# Patient Record
Sex: MALE | URBAN - METROPOLITAN AREA
[De-identification: names, ages, dates, MRNs, and addresses within clinical notes are randomized per-mention and may not be internally consistent; named-entity substitution may affect disease eponyms.]

---

## 2021-04-06 ENCOUNTER — OUTPATIENT (OUTPATIENT)
Dept: OUTPATIENT SERVICES | Facility: HOSPITAL | Age: 66
LOS: 1 days | End: 2021-04-06
Payer: MEDICAID

## 2021-04-06 VITALS
HEIGHT: 68 IN | HEART RATE: 65 BPM | OXYGEN SATURATION: 97 % | RESPIRATION RATE: 17 BRPM | SYSTOLIC BLOOD PRESSURE: 169 MMHG | TEMPERATURE: 98 F | DIASTOLIC BLOOD PRESSURE: 89 MMHG | WEIGHT: 169.98 LBS

## 2021-04-06 DIAGNOSIS — I10 ESSENTIAL (PRIMARY) HYPERTENSION: ICD-10-CM

## 2021-04-06 DIAGNOSIS — M17.11 UNILATERAL PRIMARY OSTEOARTHRITIS, RIGHT KNEE: ICD-10-CM

## 2021-04-06 DIAGNOSIS — Z01.818 ENCOUNTER FOR OTHER PREPROCEDURAL EXAMINATION: ICD-10-CM

## 2021-04-06 DIAGNOSIS — I25.10 ATHEROSCLEROTIC HEART DISEASE OF NATIVE CORONARY ARTERY WITHOUT ANGINA PECTORIS: ICD-10-CM

## 2021-04-06 DIAGNOSIS — Z95.5 PRESENCE OF CORONARY ANGIOPLASTY IMPLANT AND GRAFT: ICD-10-CM

## 2021-04-06 DIAGNOSIS — Z95.5 PRESENCE OF CORONARY ANGIOPLASTY IMPLANT AND GRAFT: Chronic | ICD-10-CM

## 2021-04-06 DIAGNOSIS — Z29.9 ENCOUNTER FOR PROPHYLACTIC MEASURES, UNSPECIFIED: ICD-10-CM

## 2021-04-06 LAB
ANION GAP SERPL CALC-SCNC: 6 MMOL/L — SIGNIFICANT CHANGE UP (ref 5–17)
APTT BLD: 35.8 SEC — HIGH (ref 27.5–35.5)
BUN SERPL-MCNC: 11 MG/DL — SIGNIFICANT CHANGE UP (ref 7–18)
CALCIUM SERPL-MCNC: 10.2 MG/DL — SIGNIFICANT CHANGE UP (ref 8.4–10.5)
CHLORIDE SERPL-SCNC: 111 MMOL/L — HIGH (ref 96–108)
CO2 SERPL-SCNC: 27 MMOL/L — SIGNIFICANT CHANGE UP (ref 22–31)
CREAT SERPL-MCNC: 0.88 MG/DL — SIGNIFICANT CHANGE UP (ref 0.5–1.3)
GLUCOSE SERPL-MCNC: 86 MG/DL — SIGNIFICANT CHANGE UP (ref 70–99)
HCT VFR BLD CALC: 41.9 % — SIGNIFICANT CHANGE UP (ref 39–50)
HGB BLD-MCNC: 13.6 G/DL — SIGNIFICANT CHANGE UP (ref 13–17)
INR BLD: 1.05 RATIO — SIGNIFICANT CHANGE UP (ref 0.88–1.16)
MCHC RBC-ENTMCNC: 30.4 PG — SIGNIFICANT CHANGE UP (ref 27–34)
MCHC RBC-ENTMCNC: 32.5 GM/DL — SIGNIFICANT CHANGE UP (ref 32–36)
MCV RBC AUTO: 93.7 FL — SIGNIFICANT CHANGE UP (ref 80–100)
NRBC # BLD: 0 /100 WBCS — SIGNIFICANT CHANGE UP (ref 0–0)
PLATELET # BLD AUTO: 174 K/UL — SIGNIFICANT CHANGE UP (ref 150–400)
POTASSIUM SERPL-MCNC: 4 MMOL/L — SIGNIFICANT CHANGE UP (ref 3.5–5.3)
POTASSIUM SERPL-SCNC: 4 MMOL/L — SIGNIFICANT CHANGE UP (ref 3.5–5.3)
PROTHROM AB SERPL-ACNC: 12.5 SEC — SIGNIFICANT CHANGE UP (ref 10.6–13.6)
RBC # BLD: 4.47 M/UL — SIGNIFICANT CHANGE UP (ref 4.2–5.8)
RBC # FLD: 12.4 % — SIGNIFICANT CHANGE UP (ref 10.3–14.5)
SODIUM SERPL-SCNC: 144 MMOL/L — SIGNIFICANT CHANGE UP (ref 135–145)
WBC # BLD: 5.35 K/UL — SIGNIFICANT CHANGE UP (ref 3.8–10.5)
WBC # FLD AUTO: 5.35 K/UL — SIGNIFICANT CHANGE UP (ref 3.8–10.5)

## 2021-04-06 PROCEDURE — 36415 COLL VENOUS BLD VENIPUNCTURE: CPT

## 2021-04-06 PROCEDURE — 85730 THROMBOPLASTIN TIME PARTIAL: CPT

## 2021-04-06 PROCEDURE — 83036 HEMOGLOBIN GLYCOSYLATED A1C: CPT

## 2021-04-06 PROCEDURE — 85027 COMPLETE CBC AUTOMATED: CPT

## 2021-04-06 PROCEDURE — 80048 BASIC METABOLIC PNL TOTAL CA: CPT

## 2021-04-06 PROCEDURE — 87641 MR-STAPH DNA AMP PROBE: CPT

## 2021-04-06 PROCEDURE — 87640 STAPH A DNA AMP PROBE: CPT

## 2021-04-06 PROCEDURE — 85610 PROTHROMBIN TIME: CPT

## 2021-04-06 RX ORDER — ISOSORBIDE DINITRATE 5 MG/1
1 TABLET ORAL
Qty: 0 | Refills: 0 | DISCHARGE

## 2021-04-06 RX ORDER — AMLODIPINE BESYLATE 2.5 MG/1
1 TABLET ORAL
Qty: 0 | Refills: 0 | DISCHARGE

## 2021-04-06 RX ORDER — ATORVASTATIN CALCIUM 80 MG/1
1 TABLET, FILM COATED ORAL
Qty: 0 | Refills: 0 | DISCHARGE

## 2021-04-06 RX ORDER — ASPIRIN/CALCIUM CARB/MAGNESIUM 324 MG
1 TABLET ORAL
Qty: 0 | Refills: 0 | DISCHARGE

## 2021-04-06 RX ORDER — TICAGRELOR 90 MG/1
1 TABLET ORAL
Qty: 0 | Refills: 0 | DISCHARGE

## 2021-04-06 RX ORDER — LOSARTAN POTASSIUM 100 MG/1
1 TABLET, FILM COATED ORAL
Qty: 0 | Refills: 0 | DISCHARGE

## 2021-04-06 NOTE — H&P PST ADULT - NSICDXPROBLEM_GEN_ALL_CORE_FT
PROBLEM DIAGNOSES  Problem: Unilateral primary osteoarthritis, right knee  Assessment and Plan: Right Total Knee Replacement    Problem: Need for prophylactic measure  Assessment and Plan: Patient is instructed via  to take two showers, x 3 days (4/14, 4/15, 4/16), before coming for surgery.  First with regular soap, second with chlorhexidine soap given, rinse, wear clean clothes each day, come to the hospital on the 3rd days.   Patient verbalized understanding.   Literature also given  He is also told that he will be called if the swab result is positive.  At such time, he will  mupirocin ointment from his pharmacy and will apply same to both nostrils, twice daily    Problem: CAD (coronary artery disease)  Assessment and Plan: Continue your medication    Problem: Hypertension  Assessment and Plan: Continue your medications    Problem: Stented coronary artery  Assessment and Plan: Continue aspirin as before.  Follow your doctor's instruction regarding Brilinta

## 2021-04-06 NOTE — H&P PST ADULT - NSICDXPASTMEDICALHX_GEN_ALL_CORE_FT
PAST MEDICAL HISTORY:  CAD (coronary artery disease)     Hypertension     Stented coronary artery x 1 stent

## 2021-04-06 NOTE — H&P PST ADULT - HISTORY OF PRESENT ILLNESS
64 yo male with history of HLD, HLD, CAD with one stent placed x 1 years ago, reports the above.  He is scheduled for : Right Total Knee Replacement, on 4/16/21.  Patient wants to have the surgery to decrease the pain and not to have fluid in my knee.  The lastr time they removed fluid was maybe 10 days ago

## 2021-04-06 NOTE — H&P PST ADULT - REASON FOR ADMISSION
I am going to have right knee surgery.  I have a lot of pain in that knee and the doctor always has to remove fluid from my knee

## 2021-04-07 LAB
A1C WITH ESTIMATED AVERAGE GLUCOSE RESULT: 6.5 % — HIGH (ref 4–5.6)
ESTIMATED AVERAGE GLUCOSE: 140 MG/DL — HIGH (ref 68–114)
MRSA PCR RESULT.: SIGNIFICANT CHANGE UP
S AUREUS DNA NOSE QL NAA+PROBE: SIGNIFICANT CHANGE UP

## 2024-03-20 PROBLEM — I25.10 ATHEROSCLEROTIC HEART DISEASE OF NATIVE CORONARY ARTERY WITHOUT ANGINA PECTORIS: Chronic | Status: ACTIVE | Noted: 2021-04-06

## 2024-03-20 PROBLEM — Z95.5 PRESENCE OF CORONARY ANGIOPLASTY IMPLANT AND GRAFT: Chronic | Status: ACTIVE | Noted: 2021-04-06

## 2024-03-20 PROBLEM — I10 ESSENTIAL (PRIMARY) HYPERTENSION: Chronic | Status: ACTIVE | Noted: 2021-04-06

## 2024-03-20 PROBLEM — Z00.00 ENCOUNTER FOR PREVENTIVE HEALTH EXAMINATION: Status: ACTIVE | Noted: 2024-03-20

## 2024-03-21 ENCOUNTER — APPOINTMENT (OUTPATIENT)
Dept: SURGICAL ONCOLOGY | Facility: CLINIC | Age: 69
End: 2024-03-21
Payer: MEDICARE

## 2024-03-21 ENCOUNTER — NON-APPOINTMENT (OUTPATIENT)
Age: 69
End: 2024-03-21

## 2024-03-21 VITALS
HEART RATE: 58 BPM | BODY MASS INDEX: 20.27 KG/M2 | HEIGHT: 69.7 IN | DIASTOLIC BLOOD PRESSURE: 68 MMHG | OXYGEN SATURATION: 97 % | SYSTOLIC BLOOD PRESSURE: 125 MMHG | WEIGHT: 140 LBS

## 2024-03-21 DIAGNOSIS — Z86.39 PERSONAL HISTORY OF OTHER ENDOCRINE, NUTRITIONAL AND METABOLIC DISEASE: ICD-10-CM

## 2024-03-21 DIAGNOSIS — Z86.79 PERSONAL HISTORY OF OTHER DISEASES OF THE CIRCULATORY SYSTEM: ICD-10-CM

## 2024-03-21 DIAGNOSIS — C25.1 MALIGNANT NEOPLASM OF BODY OF PANCREAS: ICD-10-CM

## 2024-03-21 PROCEDURE — 99205 OFFICE O/P NEW HI 60 MIN: CPT

## 2024-03-24 PROBLEM — Z86.79 HISTORY OF ATRIAL FIBRILLATION: Status: RESOLVED | Noted: 2024-03-24 | Resolved: 2024-03-24

## 2024-03-24 PROBLEM — C25.1 PRIMARY CANCER OF BODY OF PANCREAS: Status: ACTIVE | Noted: 2024-03-24

## 2024-03-24 PROBLEM — Z86.39 HISTORY OF DIABETES MELLITUS: Status: RESOLVED | Noted: 2024-03-24 | Resolved: 2024-03-24

## 2024-03-24 RX ORDER — APIXABAN 5 MG/1
TABLET, FILM COATED ORAL
Refills: 0 | Status: ACTIVE | COMMUNITY

## 2024-03-24 NOTE — CONSULT LETTER
[Dear  ___] : Dear  [unfilled], [Courtesy Letter:] : I had the pleasure of seeing your patient, [unfilled], in my office today. [Please see my note below.] : Please see my note below. [Consult Closing:] : Thank you very much for allowing me to participate in the care of this patient.  If you have any questions, please do not hesitate to contact me. [Sincerely,] : Sincerely, [FreeTextEntry2] : Dr. Jaylan Snyder [FreeTextEntry3] : Toro Alonso (M) [phone] (O) [phone] [DrKeely  ___] : Dr. BUCHANAN

## 2024-03-24 NOTE — HISTORY OF PRESENT ILLNESS
[de-identified] : Mr. 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 CT imaging 10/17/2022 demonstrated a 1.8 x 1.4 cm pancreatic body mass. EUS/FNA 11/04/2022 demonstrated invasive adenocarcinoma. Patient was evaluated by surgical/medical oncology at [facility] and felt to have a borderline resectable cancer and was recommended to receive neoadjuvant chemotherapy. He received 3 cycles of mFOLFIRINOX neoadjuvant chemotherapy. Interval imaging 01/23/2023 showed interval enlargement of the pancreas cancer to 2. x 1.8 cm, now with posteriorly involving splenic artery/vein and celiac axis.  There was no evidence of distant metastasis. He is s/p celiac nerve block 02/06/2023. He then started Xeloda/radiation treatment 02/24/2023 which he completed 03/29/2023. CT pancreas protocol 06/02/2023 showed stable 2.0 cm pancreas body cancer with celiac involvement. Due to unresectability, he started FOLXFOX 06/05/2023 for 2 cycles. CT evaluation 11/20/2023 showed stable 1.5 cm pancreas cancer but with encasement of celiac axis.  No metastatic disease. 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  No obvious metastatic disease.  He has been recommended to continue with chemotherapy, possibly Aegenus C-800-22 study with Gemcitabine/Abraxane +/- Botensilimab. He presents for discussion of potential surgical resection. Patient is tolerating diet but has persistent upper abdominal pain.  He denies nausea/emesis.

## 2024-03-24 NOTE — REASON FOR VISIT
[Initial Consultation] : an initial consultation for [Family Member] : family member [FreeTextEntry2] : locally advanced/unresectable pancreatic body cancer

## 2024-03-24 NOTE — ASSESSMENT
[FreeTextEntry1] : 67 y/o male presents with a locally advanced pancreatic cancer involving celiac axis. Due to significant posterior infiltration toward aorta, he is no longer a candidate for surgical resection. All previous imaging was reviewed - he was resectable at initial presentation with distal pancreatectomy/splenectomy and potentially resectable with DP/CAR early 2023 when there was tumor progression started to infiltrate toward celiac axis. Elevated CA 19-9 but laboratory values are not available for review. He has not exhibited imaging evidence of distant metastasis. Agree with need to continue systemic therapy. Will review iimaging at [facility] to see if patient is a candidate for irreversible electroporation (ARAMIS). All questions answered.

## 2024-03-25 ENCOUNTER — OUTPATIENT (OUTPATIENT)
Dept: OUTPATIENT SERVICES | Facility: HOSPITAL | Age: 69
LOS: 1 days | End: 2024-03-25
Payer: MEDICARE

## 2024-03-25 ENCOUNTER — RESULT REVIEW (OUTPATIENT)
Age: 69
End: 2024-03-25

## 2024-03-25 DIAGNOSIS — C25.9 MALIGNANT NEOPLASM OF PANCREAS, UNSPECIFIED: ICD-10-CM

## 2024-03-25 DIAGNOSIS — Z95.5 PRESENCE OF CORONARY ANGIOPLASTY IMPLANT AND GRAFT: Chronic | ICD-10-CM

## 2024-03-25 PROCEDURE — 88321 CONSLTJ&REPRT SLD PREP ELSWR: CPT

## 2024-03-26 LAB — SURGICAL PATHOLOGY STUDY: SIGNIFICANT CHANGE UP

## 2024-06-06 ENCOUNTER — APPOINTMENT (OUTPATIENT)
Dept: RADIATION ONCOLOGY | Facility: CLINIC | Age: 69
End: 2024-06-06
Payer: MEDICARE

## 2024-06-06 ENCOUNTER — OUTPATIENT (OUTPATIENT)
Dept: OUTPATIENT SERVICES | Facility: HOSPITAL | Age: 69
LOS: 1 days | Discharge: HOME | End: 2024-06-06
Payer: MEDICARE

## 2024-06-06 VITALS
RESPIRATION RATE: 16 BRPM | OXYGEN SATURATION: 99 % | BODY MASS INDEX: 18.25 KG/M2 | DIASTOLIC BLOOD PRESSURE: 61 MMHG | HEART RATE: 54 BPM | HEIGHT: 69 IN | WEIGHT: 123.24 LBS | TEMPERATURE: 96.98 F | SYSTOLIC BLOOD PRESSURE: 94 MMHG

## 2024-06-06 DIAGNOSIS — Z92.3 PERSONAL HISTORY OF IRRADIATION: ICD-10-CM

## 2024-06-06 DIAGNOSIS — Z95.5 PRESENCE OF CORONARY ANGIOPLASTY IMPLANT AND GRAFT: Chronic | ICD-10-CM

## 2024-06-06 DIAGNOSIS — Z92.21 PERSONAL HISTORY OF ANTINEOPLASTIC CHEMOTHERAPY: ICD-10-CM

## 2024-06-06 DIAGNOSIS — I25.10 ATHEROSCLEROTIC HEART DISEASE OF NATIVE CORONARY ARTERY W/OUT ANGINA PECTORIS: ICD-10-CM

## 2024-06-06 DIAGNOSIS — E78.5 HYPERLIPIDEMIA, UNSPECIFIED: ICD-10-CM

## 2024-06-06 DIAGNOSIS — I10 ESSENTIAL (PRIMARY) HYPERTENSION: ICD-10-CM

## 2024-06-06 DIAGNOSIS — Z80.8 FAMILY HISTORY OF MALIGNANT NEOPLASM OF OTHER ORGANS OR SYSTEMS: ICD-10-CM

## 2024-06-06 DIAGNOSIS — Z80.1 FAMILY HISTORY OF MALIGNANT NEOPLASM OF TRACHEA, BRONCHUS AND LUNG: ICD-10-CM

## 2024-06-06 PROCEDURE — 99204 OFFICE O/P NEW MOD 45 MIN: CPT | Mod: GC

## 2024-06-06 RX ORDER — LINACLOTIDE 290 UG/1
290 CAPSULE, GELATIN COATED ORAL
Refills: 0 | Status: ACTIVE | COMMUNITY

## 2024-06-06 RX ORDER — ASPIRIN 81 MG
81 TABLET, DELAYED RELEASE (ENTERIC COATED) ORAL
Refills: 0 | Status: ACTIVE | COMMUNITY

## 2024-06-06 RX ORDER — DIGOXIN 125 UG/1
125 TABLET ORAL
Refills: 0 | Status: ACTIVE | COMMUNITY

## 2024-06-06 RX ORDER — METFORMIN HYDROCHLORIDE 850 MG/1
850 TABLET, COATED ORAL
Refills: 0 | Status: ACTIVE | COMMUNITY

## 2024-06-06 RX ORDER — ISOSORBIDE MONONITRATE 30 MG
30 TABLET, EXTENDED RELEASE 24 HR ORAL
Refills: 0 | Status: ACTIVE | COMMUNITY

## 2024-06-06 RX ORDER — AMLODIPINE BESYLATE 5 MG/1
5 TABLET ORAL
Refills: 0 | Status: ACTIVE | COMMUNITY

## 2024-06-06 RX ORDER — ATORVASTATIN CALCIUM 20 MG/1
20 TABLET, FILM COATED ORAL
Refills: 0 | Status: ACTIVE | COMMUNITY

## 2024-06-06 RX ORDER — FENTANYL 75 UG/H
75 PATCH, EXTENDED RELEASE TRANSDERMAL
Refills: 0 | Status: ACTIVE | COMMUNITY

## 2024-06-06 RX ORDER — SENNOSIDES 8.6 MG TABLETS 8.6 MG/1
8.6 TABLET ORAL
Refills: 0 | Status: ACTIVE | COMMUNITY

## 2024-06-06 RX ORDER — POLYETHYLENE GLYCOL 3350 17 G/17G
17 POWDER, FOR SOLUTION ORAL
Refills: 0 | Status: ACTIVE | COMMUNITY

## 2024-06-06 RX ORDER — HYDROMORPHONE HYDROCHLORIDE 4 MG/1
4 TABLET ORAL
Refills: 0 | Status: ACTIVE | COMMUNITY

## 2024-06-06 RX ORDER — LACTULOSE 10 G/15ML
10 SOLUTION ORAL
Refills: 0 | Status: ACTIVE | COMMUNITY

## 2024-06-06 RX ORDER — LOSARTAN POTASSIUM 100 MG/1
100 TABLET, FILM COATED ORAL
Refills: 0 | Status: ACTIVE | COMMUNITY

## 2024-06-06 NOTE — VITALS
[Maximal Pain Intensity: 8/10] : 8/10 [Least Pain Intensity: 4/10] : 4/10 [Pain Duration: ___] : Pain duration: [unfilled] [Pain Location: ___] : Pain Location: [unfilled] [Pain Interferes with ADLs] : Pain interferes with activities of daily living. [Opioid] : opioid [80: Normal activity with effort; some signs or symptoms of disease.] : 80: Normal activity with effort; some signs or symptoms of disease.  [ECOG Performance Status: 1 - Restricted in physically strenuous activity but ambulatory and able to carry out work of a light or sedentary nature] : Performance Status: 1 - Restricted in physically strenuous activity but ambulatory and able to carry out work of a light or sedentary nature, e.g., light house work, office work [5 - Distress Level] : Distress Level: 5

## 2024-06-11 ENCOUNTER — NON-APPOINTMENT (OUTPATIENT)
Age: 69
End: 2024-06-11

## 2024-06-11 VITALS
HEART RATE: 55 BPM | OXYGEN SATURATION: 100 % | TEMPERATURE: 97 F | RESPIRATION RATE: 17 BRPM | HEIGHT: 69 IN | BODY MASS INDEX: 19.48 KG/M2 | DIASTOLIC BLOOD PRESSURE: 68 MMHG | WEIGHT: 131.5 LBS | SYSTOLIC BLOOD PRESSURE: 116 MMHG

## 2024-06-11 PROCEDURE — 77263 THER RADIOLOGY TX PLNG CPLX: CPT

## 2024-06-11 PROCEDURE — 77334 RADIATION TREATMENT AID(S): CPT | Mod: 26

## 2024-06-11 PROCEDURE — 77290 THER RAD SIMULAJ FIELD CPLX: CPT | Mod: 26

## 2024-06-12 NOTE — REVIEW OF SYSTEMS
[Recent Change In Weight] : ~T recent weight change [Abdominal Pain] : abdominal pain [Constipation] : constipation [Joint Pain] : joint pain [Muscle Pain] : muscle pain [Muscle Weakness] : muscle weakness [Anxiety] : anxiety [Easy Bleeding] : a tendency for easy bleeding [Easy Bruising] : a tendency for easy bruising [Negative] : Allergic/Immunologic [Fever] : no fever [Chills] : no chills [Night Sweats] : no night sweats [Fatigue] : no fatigue [Vomiting] : no vomiting [Diarrhea] : no diarrhea [Disturbance Of Gait] : no gait disturbance [Suicidal] : not suicidal [Insomnia] : no insomnia [Depression] : no depression [Swollen Glands] : no swollen glands

## 2024-06-12 NOTE — PHYSICAL EXAM
[Abdomen Soft] : soft [Normal] : oriented to person, place and time, the affect was normal, the mood was normal and not anxious [de-identified] : ternderness uppper abdomen

## 2024-06-12 NOTE — HISTORY OF PRESENT ILLNESS
[FreeTextEntry1] : Mr. 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 EUS/FNA 11/04/2022 demonstrated invasive adenocarcinoma. Patient was evaluated by surgical/medical oncology at [facility] and felt to have a borderline resectable cancer and was recommended to receive neoadjuvant chemotherapy. He received 3 cycles of mFOLFIRINOX neoadjuvant chemotherapy. Interval imaging 01/23/2023 showed interval enlargement of the pancreas cancer to 2. x 1.8 cm, now with posteriorly involving splenic artery/vein and celiac axis. There was no evidence of distant metastasis. He is s/p celiac nerve block 02/06/2023. He then started Xeloda/radiation treatment 5040cGy 02/24/2023 which he completed 03/29/2023. CT pancreas protocol 06/02/2023 showed stable 2.0 cm pancreas body cancer with celiac involvement. Due to unresectability, he started FOLXFOX 06/05/2023 for 2 cycles. CT evaluation 11/20/2023 showed stable 1.5 cm pancreas cancer but with encasement of celiac axis. No metastatic disease. 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 No obvious metastatic disease. He is continuing with chemotherapy with Gemcitabine/Abraxane +/- Botensilimab.  He's here today with dtr as . C/o abdominal pain radiating to back.

## 2024-06-12 NOTE — REASON FOR VISIT
[Consideration for Non-Curative Therapy] : consideration for non-curative therapy for [Other: ___] : [unfilled] [Other: ______] : provided by DEWAYNE [Interpreters_FullName] : Dr. Gonzales [FreeTextEntry3] : Mandarin [TWNoteComboBox1] : Chinese

## 2024-06-20 ENCOUNTER — TRANSCRIPTION ENCOUNTER (OUTPATIENT)
Age: 69
End: 2024-06-20

## 2024-07-01 PROCEDURE — 77293 RESPIRATOR MOTION MGMT SIMUL: CPT | Mod: 26

## 2024-07-01 PROCEDURE — 77334 RADIATION TREATMENT AID(S): CPT | Mod: 26

## 2024-07-01 PROCEDURE — 77295 3-D RADIOTHERAPY PLAN: CPT | Mod: 26

## 2024-07-01 PROCEDURE — 77300 RADIATION THERAPY DOSE PLAN: CPT | Mod: 26

## 2024-07-22 ENCOUNTER — NON-APPOINTMENT (OUTPATIENT)
Age: 69
End: 2024-07-22

## 2024-07-22 PROCEDURE — 77435 SBRT MANAGEMENT: CPT

## 2024-07-22 NOTE — HISTORY OF PRESENT ILLNESS
[FreeTextEntry1] :  INITIAL CONSULTATION: JUNE 6,2024 Mr. 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 EUS/FNA 11/04/2022 demonstrated invasive adenocarcinoma. Patient was evaluated by surgical/medical oncology at [facility] and felt to have a borderline resectable cancer and was recommended to receive neoadjuvant chemotherapy. He received 3 cycles of mFOLFIRINOX neoadjuvant chemotherapy. Interval imaging 01/23/2023 showed interval enlargement of the pancreas cancer to 2. x 1.8 cm, now with posteriorly involving splenic artery/vein and celiac axis. There was no evidence of distant metastasis. He is s/p celiac nerve block 02/06/2023. He then started Xeloda/radiation treatment 5040cGy 02/24/2023 which he completed 03/29/2023. CT pancreas protocol 06/02/2023 showed stable 2.0 cm pancreas body cancer with celiac involvement. Due to unresectability, he started FOLXFOX 06/05/2023 for 2 cycles. CT evaluation 11/20/2023 showed stable 1.5 cm pancreas cancer but with encasement of celiac axis. No metastatic disease. 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 No obvious metastatic disease. He is continuing with chemotherapy with Gemcitabine/Abraxane +/- Botensilimab.  He's here today with dtr as . C/o abdominal pain radiating to back.   VISIT DATED: 7/22/2024 Patient present for OTV completed 5/5 Fxs today

## 2024-07-22 NOTE — DISEASE MANAGEMENT
[Clinical] : TNM Stage: c [TTNM] : - [NTNM] : - [MTNM] : - [N/A] : Currently not applicable [de-identified] : 3500cGY [de-identified] : 4474cGY [de-identified] : PANCREAS

## 2024-07-22 NOTE — PHYSICAL EXAM
[Abdomen Soft] : soft [Normal] : oriented to person, place and time, the affect was normal, the mood was normal and not anxious [de-identified] : ternderness uppper abdomen

## 2024-07-22 NOTE — REVIEW OF SYSTEMS
[Fever] : no fever [Chills] : no chills [Night Sweats] : no night sweats [Fatigue] : no fatigue [Recent Change In Weight] : ~T recent weight change [Abdominal Pain] : abdominal pain [Vomiting] : no vomiting [Constipation] : constipation [Diarrhea] : no diarrhea [Joint Pain] : joint pain [Muscle Pain] : muscle pain [Muscle Weakness] : muscle weakness [Disturbance Of Gait] : no gait disturbance [Suicidal] : not suicidal [Insomnia] : no insomnia [Anxiety] : anxiety [Depression] : no depression [Easy Bleeding] : a tendency for easy bleeding [Easy Bruising] : a tendency for easy bruising [Swollen Glands] : no swollen glands [Negative] : Allergic/Immunologic

## 2024-08-27 NOTE — HISTORY OF PRESENT ILLNESS
[FreeTextEntry1] : INITIAL CONSULTATION: JUNE 6,2024 Mr. 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 EUS/FNA 11/04/2022 demonstrated invasive adenocarcinoma. Patient was evaluated by surgical/medical oncology at [facility] and felt to have a borderline resectable cancer and was recommended to receive neoadjuvant chemotherapy. He received 3 cycles of mFOLFIRINOX neoadjuvant chemotherapy. Interval imaging 01/23/2023 showed interval enlargement of the pancreas cancer to 2. x 1.8 cm, now with posteriorly involving splenic artery/vein and celiac axis. There was no evidence of distant metastasis. He is s/p celiac nerve block 02/06/2023. He then started Xeloda/radiation treatment 5040cGy 02/24/2023 which he completed 03/29/2023. CT pancreas protocol 06/02/2023 showed stable 2.0 cm pancreas body cancer with celiac involvement. Due to unresectability, he started FOLXFOX 06/05/2023 for 2 cycles. CT evaluation 11/20/2023 showed stable 1.5 cm pancreas cancer but with encasement of celiac axis. No metastatic disease. 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 No obvious metastatic disease. He is continuing with chemotherapy with Gemcitabine/Abraxane +/- Botensilimab.  He's here today with dtr as . C/o abdominal pain radiating to back.   7/22/2024 Completed SBRT to pancreas total dose of 3500cGy in 5 fractions.  Presents today for follow up.

## 2024-09-04 ENCOUNTER — APPOINTMENT (OUTPATIENT)
Dept: RADIATION ONCOLOGY | Facility: CLINIC | Age: 69
End: 2024-09-04
Payer: MEDICARE

## 2024-09-04 ENCOUNTER — NON-APPOINTMENT (OUTPATIENT)
Age: 69
End: 2024-09-04

## 2024-09-04 VITALS
HEART RATE: 55 BPM | TEMPERATURE: 97.5 F | DIASTOLIC BLOOD PRESSURE: 72 MMHG | SYSTOLIC BLOOD PRESSURE: 114 MMHG | OXYGEN SATURATION: 100 % | RESPIRATION RATE: 18 BRPM

## 2024-09-04 DIAGNOSIS — K59.00 CONSTIPATION, UNSPECIFIED: ICD-10-CM

## 2024-09-04 DIAGNOSIS — R11.0 NAUSEA: ICD-10-CM

## 2024-09-04 PROCEDURE — 99213 OFFICE O/P EST LOW 20 MIN: CPT | Mod: GC

## 2024-09-04 RX ORDER — METOCLOPRAMIDE 5 MG/1
5 TABLET ORAL 3 TIMES DAILY
Qty: 60 | Refills: 1 | Status: ACTIVE | COMMUNITY
Start: 2024-09-04 | End: 1900-01-01

## 2024-09-06 ENCOUNTER — OUTPATIENT (OUTPATIENT)
Dept: OUTPATIENT SERVICES | Facility: HOSPITAL | Age: 69
LOS: 1 days | Discharge: HOME | End: 2024-09-06

## 2024-09-06 DIAGNOSIS — Z95.5 PRESENCE OF CORONARY ANGIOPLASTY IMPLANT AND GRAFT: Chronic | ICD-10-CM

## 2024-09-06 DIAGNOSIS — C25.9 MALIGNANT NEOPLASM OF PANCREAS, UNSPECIFIED: ICD-10-CM

## 2024-09-07 ENCOUNTER — APPOINTMENT (OUTPATIENT)
Dept: CT IMAGING | Facility: CLINIC | Age: 69
End: 2024-09-07

## 2024-09-07 ENCOUNTER — OUTPATIENT (OUTPATIENT)
Dept: OUTPATIENT SERVICES | Facility: HOSPITAL | Age: 69
LOS: 1 days | End: 2024-09-07
Payer: MEDICARE

## 2024-09-07 DIAGNOSIS — Z95.5 PRESENCE OF CORONARY ANGIOPLASTY IMPLANT AND GRAFT: Chronic | ICD-10-CM

## 2024-09-07 DIAGNOSIS — K59.00 CONSTIPATION, UNSPECIFIED: ICD-10-CM

## 2024-09-07 DIAGNOSIS — C25.1 MALIGNANT NEOPLASM OF BODY OF PANCREAS: ICD-10-CM

## 2024-09-07 DIAGNOSIS — R11.0 NAUSEA: ICD-10-CM

## 2024-09-07 PROCEDURE — 74177 CT ABD & PELVIS W/CONTRAST: CPT

## 2024-09-07 PROCEDURE — 74177 CT ABD & PELVIS W/CONTRAST: CPT | Mod: 26

## 2024-09-09 ENCOUNTER — NON-APPOINTMENT (OUTPATIENT)
Age: 69
End: 2024-09-09

## 2024-09-10 ENCOUNTER — APPOINTMENT (OUTPATIENT)
Dept: MULTI SPECIALTY CLINIC | Facility: CLINIC | Age: 69
End: 2024-09-10
Payer: MEDICARE

## 2024-09-10 ENCOUNTER — APPOINTMENT (OUTPATIENT)
Dept: PAIN MANAGEMENT | Facility: CLINIC | Age: 69
End: 2024-09-10

## 2024-09-10 ENCOUNTER — NON-APPOINTMENT (OUTPATIENT)
Age: 69
End: 2024-09-10

## 2024-09-10 VITALS
DIASTOLIC BLOOD PRESSURE: 67 MMHG | WEIGHT: 123 LBS | RESPIRATION RATE: 16 BRPM | OXYGEN SATURATION: 97 % | SYSTOLIC BLOOD PRESSURE: 106 MMHG | BODY MASS INDEX: 18.22 KG/M2 | TEMPERATURE: 97 F | HEIGHT: 69 IN | HEART RATE: 56 BPM

## 2024-09-10 DIAGNOSIS — G89.3 NEOPLASM RELATED PAIN (ACUTE) (CHRONIC): ICD-10-CM

## 2024-09-10 DIAGNOSIS — C25.1 MALIGNANT NEOPLASM OF BODY OF PANCREAS: ICD-10-CM

## 2024-09-10 PROCEDURE — G2211 COMPLEX E/M VISIT ADD ON: CPT

## 2024-09-10 PROCEDURE — ZZZZZ: CPT | Mod: 1L

## 2024-09-10 PROCEDURE — 99205 OFFICE O/P NEW HI 60 MIN: CPT

## 2024-09-10 PROCEDURE — 99213 OFFICE O/P EST LOW 20 MIN: CPT | Mod: 1L

## 2024-09-10 NOTE — PHYSICAL EXAM
[General Appearance - Well Developed] : well developed [General Appearance - Well Nourished] : well nourished [Sclera] : the sclera and conjunctiva were normal [Extraocular Movements] : extraocular movements were intact [Outer Ear] : the ears and nose were normal in appearance [Hearing Threshold Finger Rub Not [place]] : hearing was normal [Examination Of The Oral Cavity] : the lips and gums were normal [] : no respiratory distress [Exaggerated Use Of Accessory Muscles For Inspiration] : no accessory muscle use [Skin Color & Pigmentation] : normal skin color and pigmentation [No Focal Deficits] : no focal deficits [Oriented To Time, Place, And Person] : oriented to person, place, and time [de-identified] : abdominal pain in the LLQ, mildly distended with gas

## 2024-09-10 NOTE — HISTORY OF PRESENT ILLNESS
[FreeTextEntry1] : INITIAL CONSULTATION: JUNE 6,2024 Mr. 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 EUS/FNA 11/04/2022 demonstrated invasive adenocarcinoma. Patient was evaluated by surgical/medical oncology at [facility] and felt to have a borderline resectable cancer and was recommended to receive neoadjuvant chemotherapy. He received 3 cycles of mFOLFIRINOX neoadjuvant chemotherapy. Interval imaging 01/23/2023 showed interval enlargement of the pancreas cancer to 2. x 1.8 cm, now with posteriorly involving splenic artery/vein and celiac axis. There was no evidence of distant metastasis. He is s/p celiac nerve block 02/06/2023. He then started Xeloda/radiation treatment 5040cGy 02/24/2023 which he completed 03/29/2023. CT pancreas protocol 06/02/2023 showed stable 2.0 cm pancreas body cancer with celiac involvement. Due to unresectability, he started FOLXFOX 06/05/2023 for 2 cycles. CT evaluation 11/20/2023 showed stable 1.5 cm pancreas cancer but with encasement of celiac axis. No metastatic disease. 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 No obvious metastatic disease. He is continuing with chemotherapy with Gemcitabine/Abraxane +/- Botensilimab.  He's here today with dtr as . C/o abdominal pain radiating to back.   7/17/24: CT chest, abdomen, pelvis Pancreatic body mass, increased in size. New RP lymphadenopathy vs direct extension of the pancreatic mass. 5mm RUL groundglass pulmonary nodule, not significantly changed, most likely benign.  7/22/2024 Completed SBRT to pancreas total dose of 3500cGy in 5 fractions.  Presents today for follow up. He continues to have LLQ pain. He is on pain medication, managed by the pain management team at [facility]. He has nausea but is taking medication for it as well. His stools are light and is taking creon. Dr Bronson ([facility]) Surgeon Dr Alonso/[facility] Dr Dixon ([facility])

## 2024-09-10 NOTE — ASSESSMENT
[FreeTextEntry1] : Mr. KATE RUIZ is a 69-year-old male who presents for evaluation in our Pancreas [facility] for 2nd medical oncology opinion. Pt is Mandarin speaking, daughter Kimberli assisted with history. 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 Med/onc Dr Tanisha Dixon at [facility]. We do not have molecular testing data today for review, other than a Guardant test which showed no mutations; do not see evidence of tissue molecular testing which may suggest that there was insufficient tissue on tissue testing.   We discussed that it is unclear why he has such significant LLQ abdominal pain, without any clear sign of LLQ pathology. Reports abdominal pain is relieved with bowel movement. Reports alternating diarrhea and constipation.  In clinic today he is ECOG 3 or worse; he spends 75%+ of his time lying in bed resting and in pain. Given his presentation today, closer follow-up with palliative care is likely to be beneficial. Recommend close Palliative Care follow up to optimize pain regimen - perhaps fentanyl pain patch less effective with recent weight loss. We believe cytotoxic chemotherapy is likely to cause more harm than benefit given his performance status. He does not have adequate performance status for clinical trials. Options include continuing with low intensity chemotherapy, or transition to more comfort-based care and hospice evaluation at this time.  Thanks for the opportunity to participate in the care of this patient.  Bret Fernandez MD PhD Medical Oncology Attending I personally have spent a total of 60 minutes of time on the date of this encounter reviewing test results, documenting findings, coordinating care, and directly consulting with the patient and/or designated family member. I was present with the trainee during the key portions of the history and exam. I agree with the findings and plan as documented above.

## 2024-09-10 NOTE — HISTORY OF PRESENT ILLNESS
[FreeTextEntry1] : INITIAL CONSULTATION: JUNE 6,2024 Mr. 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 EUS/FNA 11/04/2022 demonstrated invasive adenocarcinoma. Patient was evaluated by surgical/medical oncology at [facility] and felt to have a borderline resectable cancer and was recommended to receive neoadjuvant chemotherapy. He received 3 cycles of mFOLFIRINOX neoadjuvant chemotherapy. Interval imaging 01/23/2023 showed interval enlargement of the pancreas cancer to 2. x 1.8 cm, now with posteriorly involving splenic artery/vein and celiac axis. There was no evidence of distant metastasis. He is s/p celiac nerve block 02/06/2023. He then started Xeloda/radiation treatment 5040cGy 02/24/2023 which he completed 03/29/2023. CT pancreas protocol 06/02/2023 showed stable 2.0 cm pancreas body cancer with celiac involvement. Due to unresectability, he started FOLXFOX 06/05/2023 for 2 cycles. CT evaluation 11/20/2023 showed stable 1.5 cm pancreas cancer but with encasement of celiac axis. No metastatic disease. 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 No obvious metastatic disease. He is continuing with chemotherapy with Gemcitabine/Abraxane +/- Botensilimab.  He's here today with dtr as . C/o abdominal pain radiating to back.   7/17/24: CT chest, abdomen, pelvis Pancreatic body mass, increased in size. New RP lymphadenopathy vs direct extension of the pancreatic mass. 5mm RUL groundglass pulmonary nodule, not significantly changed, most likely benign.  7/22/2024 Completed SBRT to pancreas total dose of 3500cGy in 5 fractions.  Presents today for follow up. He continues to have LLQ pain. He is on pain medication, managed by the pain management team at [facility]. He has nausea but is taking medication for it as well. His stools are light and is taking creon. Dr Bronson ([facility]) Surgeon Dr Alonso/[facility] Dr Dixon ([facility])

## 2024-09-10 NOTE — REVIEW OF SYSTEMS
[SOB at rest] : no shortness of breath at rest [SOB on Exertion] : no shortness of breath on exertion [Abdominal Pain] : abdominal pain [Constipation] : constipation [Diarrhea] : diarrhea [Radiating Pain] : radiating pain [Negative] : ENT

## 2024-09-10 NOTE — REASON FOR VISIT
Solaraze Counseling:  I discussed with the patient the risks of Solaraze including but not limited to erythema, scaling, itching, weeping, crusting, and pain. Zyclara Counseling:  I discussed with the patient the risks of imiquimod including but not limited to erythema, scaling, itching, weeping, crusting, and pain.  Patient understands that the inflammatory response to imiquimod is variable from person to person and was educated regarded proper titration schedule.  If flu-like symptoms develop, patient knows to discontinue the medication and contact us. Topical Retinoid counseling:  Patient advised to apply a pea-sized amount only at bedtime and wait 30 minutes after washing their face before applying.  If too drying, patient may add a non-comedogenic moisturizer. The patient verbalized understanding of the proper use and possible adverse effects of retinoids.  All of the patient's questions and concerns were addressed. Ivermectin Pregnancy And Lactation Text: This medication is Pregnancy Category C and it isn't known if it is safe during pregnancy. It is also excreted in breast milk. Terbinafine Pregnancy And Lactation Text: This medication is Pregnancy Category B and is considered safe during pregnancy. It is also excreted in breast milk and breast feeding isn't recommended. Xeljanz Counseling: I discussed with the patient the risks of Xeljanz therapy including increased risk of infection, liver issues, headache, diarrhea, or cold symptoms. Live vaccines should be avoided. They were instructed to call if they have any problems. Dupixent Counseling: I discussed with the patient the risks of dupilumab including but not limited to eye infection and irritation, cold sores, injection site reactions, worsening of asthma, allergic reactions and increased risk of parasitic infection.  Live vaccines should be avoided while taking dupilumab. Dupilumab will also interact with certain medications such as warfarin and cyclosporine. The patient understands that monitoring is required and they must alert us or the primary physician if symptoms of infection or other concerning signs are noted. Cyclophosphamide Counseling:  I discussed with the patient the risks of cyclophosphamide including but not limited to hair loss, hormonal abnormalities, decreased fertility, abdominal pain, diarrhea, nausea and vomiting, bone marrow suppression and infection. The patient understands that monitoring is required while taking this medication. Taltz Pregnancy And Lactation Text: The risk during pregnancy and breastfeeding is uncertain with this medication. Methotrexate Counseling:  Patient counseled regarding adverse effects of methotrexate including but not limited to nausea, vomiting, abnormalities in liver function tests. Patients may develop mouth sores, rash, diarrhea, and abnormalities in blood counts. The patient understands that monitoring is required including LFT's and blood counts.  There is a rare possibility of scarring of the liver and lung problems that can occur when taking methotrexate. Persistent nausea, loss of appetite, pale stools, dark urine, cough, and shortness of breath should be reported immediately. Patient advised to discontinue methotrexate treatment at least three months before attempting to become pregnant.  I discussed the need for folate supplements while taking methotrexate.  These supplements can decrease side effects during methotrexate treatment. The patient verbalized understanding of the proper use and possible adverse effects of methotrexate.  All of the patient's questions and concerns were addressed. Hydroxychloroquine Counseling:  I discussed with the patient that a baseline ophthalmologic exam is needed at the start of therapy and every year thereafter while on therapy. A CBC may also be warranted for monitoring.  The side effects of this medication were discussed with the patient, including but not limited to agranulocytosis, aplastic anemia, seizures, rashes, retinopathy, and liver toxicity. Patient instructed to call the office should any adverse effect occur.  The patient verbalized understanding of the proper use and possible adverse effects of Plaquenil.  All the patient's questions and concerns were addressed. Cyclosporine Pregnancy And Lactation Text: This medication is Pregnancy Category C and it isn't know if it is safe during pregnancy. This medication is excreted in breast milk. Ivermectin Counseling:  Patient instructed to take medication on an empty stomach with a full glass of water.  Patient informed of potential adverse effects including but not limited to nausea, diarrhea, dizziness, itching, and swelling of the extremities or lymph nodes.  The patient verbalized understanding of the proper use and possible adverse effects of ivermectin.  All of the patient's questions and concerns were addressed. Tazorac Pregnancy And Lactation Text: This medication is not safe during pregnancy. It is unknown if this medication is excreted in breast milk. High Dose Vitamin A Pregnancy And Lactation Text: High dose vitamin A therapy is contraindicated during pregnancy and breast feeding. [Other: ______] : provided by DEWAYNE Odomzo Counseling- I discussed with the patient the risks of Odomzo including but not limited to nausea, vomiting, diarrhea, constipation, weight loss, changes in the sense of taste, decreased appetite, muscle spasms, and hair loss.  The patient verbalized understanding of the proper use and possible adverse effects of Odomzo.  All of the patient's questions and concerns were addressed. [Interpreters_Relationshiptopatient] : daughter Arava Pregnancy And Lactation Text: This medication is Pregnancy Category X and is absolutely contraindicated during pregnancy. It is unknown if it is excreted in breast milk. [[id number]] : Tha Itraconazole Counseling:  I discussed with the patient the risks of itraconazole including but not limited to liver damage, nausea/vomiting, neuropathy, and severe allergy.  The patient understands that this medication is best absorbed when taken with acidic beverages such as non-diet cola or ginger ale.  The patient understands that monitoring is required including baseline LFTs and repeat LFTs at intervals.  The patient understands that they are to contact us or the primary physician if concerning signs are noted. High Dose Vitamin A Counseling: Side effects reviewed, pt to contact office should one occur. Dapsone Pregnancy And Lactation Text: This medication is Pregnancy Category C and is not considered safe during pregnancy or breast feeding. Picato Pregnancy And Lactation Text: This medication is Pregnancy Category C. It is unknown if this medication is excreted in breast milk. Otezla Pregnancy And Lactation Text: This medication is Pregnancy Category C and it isn't known if it is safe during pregnancy. It is unknown if it is excreted in breast milk. Minoxidil Pregnancy And Lactation Text: This medication has not been assigned a Pregnancy Risk Category but animal studies failed to show danger with the topical medication. It is unknown if the medication is excreted in breast milk. 5-Fu Pregnancy And Lactation Text: This medication is Pregnancy Category X and contraindicated in pregnancy and in women who may become pregnant. It is unknown if this medication is excreted in breast milk. Cephalexin Pregnancy And Lactation Text: This medication is Pregnancy Category B and considered safe during pregnancy.  It is also excreted in breast milk but can be used safely for shorter doses. Detail Level: Generalized Birth Control Pills Pregnancy And Lactation Text: This medication should be avoided if pregnant and for the first 30 days post-partum. Valtrex Pregnancy And Lactation Text: this medication is Pregnancy Category B and is considered safe during pregnancy. This medication is not directly found in breast milk but it's metabolite acyclovir is present. Griseofulvin Pregnancy And Lactation Text: This medication is Pregnancy Category X and is known to cause serious birth defects. It is unknown if this medication is excreted in breast milk but breast feeding should be avoided. Minoxidil Counseling: Minoxidil is a topical medication which can increase blood flow where it is applied. It is uncertain how this medication increases hair growth. Side effects are uncommon and include stinging and allergic reactions. Azithromycin Counseling:  I discussed with the patient the risks of azithromycin including but not limited to GI upset, allergic reaction, drug rash, diarrhea, and yeast infections. Protopic Counseling: Patient may experience a mild burning sensation during topical application. Protopic is not approved in children less than 2 years of age. There have been case reports of hematologic and skin malignancies in patients using topical calcineurin inhibitors although causality is questionable. Drysol Counseling:  I discussed with the patient the risks of drysol/aluminum chloride including but not limited to skin rash, itching, irritation, burning. Doxycycline Counseling:  Patient counseled regarding possible photosensitivity and increased risk for sunburn.  Patient instructed to avoid sunlight, if possible.  When exposed to sunlight, patients should wear protective clothing, sunglasses, and sunscreen.  The patient was instructed to call the office immediately if the following severe adverse effects occur:  hearing changes, easy bruising/bleeding, severe headache, or vision changes.  The patient verbalized understanding of the proper use and possible adverse effects of doxycycline.  All of the patient's questions and concerns were addressed. Cimzia Pregnancy And Lactation Text: This medication crosses the placenta but can be considered safe in certain situations. Cimzia may be excreted in breast milk. Fluconazole Counseling:  Patient counseled regarding adverse effects of fluconazole including but not limited to headache, diarrhea, nausea, upset stomach, liver function test abnormalities, taste disturbance, and stomach pain.  There is a rare possibility of liver failure that can occur when taking fluconazole.  The patient understands that monitoring of LFTs and kidney function test may be required, especially at baseline. The patient verbalized understanding of the proper use and possible adverse effects of fluconazole.  All of the patient's questions and concerns were addressed. Xolair Pregnancy And Lactation Text: This medication is Pregnancy Category B and is considered safe during pregnancy. This medication is excreted in breast milk. Spironolactone Counseling: Patient advised regarding risks of diarrhea, abdominal pain, hyperkalemia, birth defects (for female patients), liver toxicity and renal toxicity. The patient may need blood work to monitor liver and kidney function and potassium levels while on therapy. The patient verbalized understanding of the proper use and possible adverse effects of spironolactone.  All of the patient's questions and concerns were addressed. Rituxan Pregnancy And Lactation Text: This medication is Pregnancy Category C and it isn't know if it is safe during pregnancy. It is unknown if this medication is excreted in breast milk but similar antibodies are known to be excreted. Cellcept Counseling:  I discussed with the patient the risks of mycophenolate mofetil including but not limited to infection/immunosuppression, GI upset, hypokalemia, hypercholesterolemia, bone marrow suppression, lymphoproliferative disorders, malignancy, GI ulceration/bleed/perforation, colitis, interstitial lung disease, kidney failure, progressive multifocal leukoencephalopathy, and birth defects.  The patient understands that monitoring is required including a baseline creatinine and regular CBC testing. In addition, patient must alert us immediately if symptoms of infection or other concerning signs are noted. Spironolactone Pregnancy And Lactation Text: This medication can cause feminization of the male fetus and should be avoided during pregnancy. The active metabolite is also found in breast milk. Picato Counseling:  I discussed with the patient the risks of Picato including but not limited to erythema, scaling, itching, weeping, crusting, and pain. Terbinafine Counseling: Patient counseling regarding adverse effects of terbinafine including but not limited to headache, diarrhea, rash, upset stomach, liver function test abnormalities, itching, taste/smell disturbance, nausea, abdominal pain, and flatulence.  There is a rare possibility of liver failure that can occur when taking terbinafine.  The patient understands that a baseline LFT and kidney function test may be required. The patient verbalized understanding of the proper use and possible adverse effects of terbinafine.  All of the patient's questions and concerns were addressed. Stelara Counseling:  I discussed with the patient the risks of ustekinumab including but not limited to immunosuppression, malignancy, posterior leukoencephalopathy syndrome, and serious infections.  The patient understands that monitoring is required including a PPD at baseline and must alert us or the primary physician if symptoms of infection or other concerning signs are noted. Xelkelseaz Pregnancy And Lactation Text: This medication is Pregnancy Category D and is not considered safe during pregnancy.  The risk during breast feeding is also uncertain. Nsaids Pregnancy And Lactation Text: These medications are considered safe up to 30 weeks gestation. It is excreted in breast milk. Hydroxyzine Pregnancy And Lactation Text: This medication is not safe during pregnancy and should not be taken. It is also excreted in breast milk and breast feeding isn't recommended. Tremfya Counseling: I discussed with the patient the risks of guselkumab including but not limited to immunosuppression, serious infections, worsening of inflammatory bowel disease and drug reactions.  The patient understands that monitoring is required including a PPD at baseline and must alert us or the primary physician if symptoms of infection or other concerning signs are noted. Tazorac Counseling:  Patient advised that medication is irritating and drying.  Patient may need to apply sparingly and wash off after an hour before eventually leaving it on overnight.  The patient verbalized understanding of the proper use and possible adverse effects of tazorac.  All of the patient's questions and concerns were addressed. Acitretin Pregnancy And Lactation Text: This medication is Pregnancy Category X and should not be given to women who are pregnant or may become pregnant in the future. This medication is excreted in breast milk. Hydroquinone Counseling:  Patient advised that medication may result in skin irritation, lightening (hypopigmentation), dryness, and burning.  In the event of skin irritation, the patient was advised to reduce the amount of the drug applied or use it less frequently.  Rarely, spots that are treated with hydroquinone can become darker (pseudoochronosis).  Should this occur, patient instructed to stop medication and call the office. The patient verbalized understanding of the proper use and possible adverse effects of hydroquinone.  All of the patient's questions and concerns were addressed. Dapsone Counseling: I discussed with the patient the risks of dapsone including but not limited to hemolytic anemia, agranulocytosis, rashes, methemoglobinemia, kidney failure, peripheral neuropathy, headaches, GI upset, and liver toxicity.  Patients who start dapsone require monitoring including baseline LFTs and weekly CBCs for the first month, then every month thereafter.  The patient verbalized understanding of the proper use and possible adverse effects of dapsone.  All of the patient's questions and concerns were addressed. Thalidomide Counseling: I discussed with the patient the risks of thalidomide including but not limited to birth defects, anxiety, weakness, chest pain, dizziness, cough and severe allergy. Quinolones Counseling:  I discussed with the patient the risks of fluoroquinolones including but not limited to GI upset, allergic reaction, drug rash, diarrhea, dizziness, photosensitivity, yeast infections, liver function test abnormalities, tendonitis/tendon rupture. Include Pregnancy/Lactation Warning?: No Humira Counseling:  I discussed with the patient the risks of adalimumab including but not limited to myelosuppression, immunosuppression, autoimmune hepatitis, demyelinating diseases, lymphoma, and serious infections.  The patient understands that monitoring is required including a PPD at baseline and must alert us or the primary physician if symptoms of infection or other concerning signs are noted. Stelara Pregnancy And Lactation Text: This medication is Pregnancy Category B and is considered safe during pregnancy. It is unknown if this medication is excreted in breast milk. Otezla Counseling: The side effects of Otezla were discussed with the patient, including but not limited to worsening or new depression, weight loss, diarrhea, nausea, upper respiratory tract infection, and headache. Patient instructed to call the office should any adverse effect occur.  The patient verbalized understanding of the proper use and possible adverse effects of Otezla.  All the patient's questions and concerns were addressed. Clindamycin Pregnancy And Lactation Text: This medication can be used in pregnancy if certain situations. Clindamycin is also present in breast milk. Hydroxyzine Counseling: Patient advised that the medication is sedating and not to drive a car after taking this medication.  Patient informed of potential adverse effects including but not limited to dry mouth, urinary retention, and blurry vision.  The patient verbalized understanding of the proper use and possible adverse effects of hydroxyzine.  All of the patient's questions and concerns were addressed. Metronidazole Counseling:  I discussed with the patient the risks of metronidazole including but not limited to seizures, nausea/vomiting, a metallic taste in the mouth, nausea/vomiting and severe allergy. Carac Counseling:  I discussed with the patient the risks of Carac including but not limited to erythema, scaling, itching, weeping, crusting, and pain. Erythromycin Counseling:  I discussed with the patient the risks of erythromycin including but not limited to GI upset, allergic reaction, drug rash, diarrhea, increase in liver enzymes, and yeast infections. Itraconazole Pregnancy And Lactation Text: This medication is Pregnancy Category C and it isn't know if it is safe during pregnancy. It is also excreted in breast milk. Gabapentin Counseling: I discussed with the patient the risks of gabapentin including but not limited to dizziness, somnolence, fatigue and ataxia. Xolair Counseling:  Patient informed of potential adverse effects including but not limited to fever, muscle aches, rash and allergic reactions.  The patient verbalized understanding of the proper use and possible adverse effects of Xolair.  All of the patient's questions and concerns were addressed. Glycopyrrolate Counseling:  I discussed with the patient the risks of glycopyrrolate including but not limited to skin rash, drowsiness, dry mouth, difficulty urinating, and blurred vision. Valtrex Counseling: I discussed with the patient the risks of valacyclovir including but not limited to kidney damage, nausea, vomiting and severe allergy.  The patient understands that if the infection seems to be worsening or is not improving, they are to call. Erythromycin Pregnancy And Lactation Text: This medication is Pregnancy Category B and is considered safe during pregnancy. It is also excreted in breast milk. Topical Sulfur Applications Counseling: Topical Sulfur Counseling: Patient counseled that this medication may cause skin irritation or allergic reactions.  In the event of skin irritation, the patient was advised to reduce the amount of the drug applied or use it less frequently.   The patient verbalized understanding of the proper use and possible adverse effects of topical sulfur application.  All of the patient's questions and concerns were addressed. Doxycycline Pregnancy And Lactation Text: This medication is Pregnancy Category D and not consider safe during pregnancy. It is also excreted in breast milk but is considered safe for shorter treatment courses. Benzoyl Peroxide Counseling: Patient counseled that medicine may cause skin irritation and bleach clothing.  In the event of skin irritation, the patient was advised to reduce the amount of the drug applied or use it less frequently.   The patient verbalized understanding of the proper use and possible adverse effects of benzoyl peroxide.  All of the patient's questions and concerns were addressed. Oxybutynin Counseling:  I discussed with the patient the risks of oxybutynin including but not limited to skin rash, drowsiness, dry mouth, difficulty urinating, and blurred vision. Elidel Counseling: Patient may experience a mild burning sensation during topical application. Elidel is not approved in children less than 2 years of age. There have been case reports of hematologic and skin malignancies in patients using topical calcineurin inhibitors although causality is questionable. Benzoyl Peroxide Pregnancy And Lactation Text: This medication is Pregnancy Category C. It is unknown if benzoyl peroxide is excreted in breast milk. Acitretin Counseling:  I discussed with the patient the risks of acitretin including but not limited to hair loss, dry lips/skin/eyes, liver damage, hyperlipidemia, depression/suicidal ideation, photosensitivity.  Serious rare side effects can include but are not limited to pancreatitis, pseudotumor cerebri, bony changes, clot formation/stroke/heart attack.  Patient understands that alcohol is contraindicated since it can result in liver toxicity and significantly prolong the elimination of the drug by many years. Simponi Counseling:  I discussed with the patient the risks of golimumab including but not limited to myelosuppression, immunosuppression, autoimmune hepatitis, demyelinating diseases, lymphoma, and serious infections.  The patient understands that monitoring is required including a PPD at baseline and must alert us or the primary physician if symptoms of infection or other concerning signs are noted. Colchicine Pregnancy And Lactation Text: This medication is Pregnancy Category C and isn't considered safe during pregnancy. It is excreted in breast milk. Cimzia Counseling:  I discussed with the patient the risks of Cimzia including but not limited to immunosuppression, allergic reactions and infections.  The patient understands that monitoring is required including a PPD at baseline and must alert us or the primary physician if symptoms of infection or other concerning signs are noted. Arava Counseling:  Patient counseled regarding adverse effects of Arava including but not limited to nausea, vomiting, abnormalities in liver function tests. Patients may develop mouth sores, rash, diarrhea, and abnormalities in blood counts. The patient understands that monitoring is required including LFTs and blood counts.  There is a rare possibility of scarring of the liver and lung problems that can occur when taking methotrexate. Persistent nausea, loss of appetite, pale stools, dark urine, cough, and shortness of breath should be reported immediately. Patient advised to discontinue Arava treatment and consult with a physician prior to attempting conception. The patient will have to undergo a treatment to eliminate Arava from the body prior to conception. Azithromycin Pregnancy And Lactation Text: This medication is considered safe during pregnancy and is also secreted in breast milk. Isotretinoin Pregnancy And Lactation Text: This medication is Pregnancy Category X and is considered extremely dangerous during pregnancy. It is unknown if it is excreted in breast milk. Topical Sulfur Applications Pregnancy And Lactation Text: This medication is Pregnancy Category C and has an unknown safety profile during pregnancy. It is unknown if this topical medication is excreted in breast milk. 5-Fu Counseling: 5-Fluorouracil Counseling:  I discussed with the patient the risks of 5-fluorouracil including but not limited to erythema, scaling, itching, weeping, crusting, and pain. Griseofulvin Counseling:  I discussed with the patient the risks of griseofulvin including but not limited to photosensitivity, cytopenia, liver damage, nausea/vomiting and severe allergy.  The patient understands that this medication is best absorbed when taken with a fatty meal (e.g., ice cream or french fries). Azathioprine Pregnancy And Lactation Text: This medication is Pregnancy Category D and isn't considered safe during pregnancy. It is unknown if this medication is excreted in breast milk. Dupixent Pregnancy And Lactation Text: This medication likely crosses the placenta but the risk for the fetus is uncertain. This medication is excreted in breast milk. Tetracycline Pregnancy And Lactation Text: This medication is Pregnancy Category D and not consider safe during pregnancy. It is also excreted in breast milk. Bactrim Pregnancy And Lactation Text: This medication is Pregnancy Category D and is known to cause fetal risk.  It is also excreted in breast milk. Methotrexate Pregnancy And Lactation Text: This medication is Pregnancy Category X and is known to cause fetal harm. This medication is excreted in breast milk. Imiquimod Counseling:  I discussed with the patient the risks of imiquimod including but not limited to erythema, scaling, itching, weeping, crusting, and pain.  Patient understands that the inflammatory response to imiquimod is variable from person to person and was educated regarded proper titration schedule.  If flu-like symptoms develop, patient knows to discontinue the medication and contact us. Glycopyrrolate Pregnancy And Lactation Text: This medication is Pregnancy Category B and is considered safe during pregnancy. It is unknown if it is excreted breast milk. Tetracycline Counseling: Patient counseled regarding possible photosensitivity and increased risk for sunburn.  Patient instructed to avoid sunlight, if possible.  When exposed to sunlight, patients should wear protective clothing, sunglasses, and sunscreen.  The patient was instructed to call the office immediately if the following severe adverse effects occur:  hearing changes, easy bruising/bleeding, severe headache, or vision changes.  The patient verbalized understanding of the proper use and possible adverse effects of tetracycline.  All of the patient's questions and concerns were addressed. Patient understands to avoid pregnancy while on therapy due to potential birth defects. Bactrim Counseling:  I discussed with the patient the risks of sulfa antibiotics including but not limited to GI upset, allergic reaction, drug rash, diarrhea, dizziness, photosensitivity, and yeast infections.  Rarely, more serious reactions can occur including but not limited to aplastic anemia, agranulocytosis, methemoglobinemia, blood dyscrasias, liver or kidney failure, lung infiltrates or desquamative/blistering drug rashes. Metronidazole Pregnancy And Lactation Text: This medication is Pregnancy Category B and considered safe during pregnancy.  It is also excreted in breast milk. Cimetidine Counseling:  I discussed with the patient the risks of Cimetidine including but not limited to gynecomastia, headache, diarrhea, nausea, drowsiness, arrhythmias, pancreatitis, skin rashes, psychosis, bone marrow suppression and kidney toxicity. Rifampin Pregnancy And Lactation Text: This medication is Pregnancy Category C and it isn't know if it is safe during pregnancy. It is also excreted in breast milk and should not be used if you are breast feeding. Clindamycin Counseling: I counseled the patient regarding use of clindamycin as an antibiotic for prophylactic and/or therapeutic purposes. Clindamycin is active against numerous classes of bacteria, including skin bacteria. Side effects may include nausea, diarrhea, gastrointestinal upset, rash, hives, yeast infections, and in rare cases, colitis. Ketoconazole Counseling:   Patient counseled regarding improving absorption with orange juice.  Adverse effects include but are not limited to breast enlargement, headache, diarrhea, nausea, upset stomach, liver function test abnormalities, taste disturbance, and stomach pain.  There is a rare possibility of liver failure that can occur when taking ketoconazole. The patient understands that monitoring of LFTs may be required, especially at baseline. The patient verbalized understanding of the proper use and possible adverse effects of ketoconazole.  All of the patient's questions and concerns were addressed. Enbrel Counseling:  I discussed with the patient the risks of etanercept including but not limited to myelosuppression, immunosuppression, autoimmune hepatitis, demyelinating diseases, lymphoma, and infections.  The patient understands that monitoring is required including a PPD at baseline and must alert us or the primary physician if symptoms of infection or other concerning signs are noted. Doxepin Pregnancy And Lactation Text: This medication is Pregnancy Category C and it isn't known if it is safe during pregnancy. It is also excreted in breast milk and breast feeding isn't recommended. Bexarotene Counseling:  I discussed with the patient the risks of bexarotene including but not limited to hair loss, dry lips/skin/eyes, liver abnormalities, hyperlipidemia, pancreatitis, depression/suicidal ideation, photosensitivity, drug rash/allergic reactions, hypothyroidism, anemia, leukopenia, infection, cataracts, and teratogenicity.  Patient understands that they will need regular blood tests to check lipid profile, liver function tests, white blood cell count, thyroid function tests and pregnancy test if applicable. Siliq Counseling:  I discussed with the patient the risks of Siliq including but not limited to new or worsening depression, suicidal thoughts and behavior, immunosuppression, malignancy, posterior leukoencephalopathy syndrome, and serious infections.  The patient understands that monitoring is required including a PPD at baseline and must alert us or the primary physician if symptoms of infection or other concerning signs are noted. There is also a special program designed to monitor depression which is required with Siliq. Taltz Counseling: I discussed with the patient the risks of ixekizumab including but not limited to immunosuppression, serious infections, worsening of inflammatory bowel disease and drug reactions.  The patient understands that monitoring is required including a PPD at baseline and must alert us or the primary physician if symptoms of infection or other concerning signs are noted. Minocycline Counseling: Patient advised regarding possible photosensitivity and discoloration of the teeth, skin, lips, tongue and gums.  Patient instructed to avoid sunlight, if possible.  When exposed to sunlight, patients should wear protective clothing, sunglasses, and sunscreen.  The patient was instructed to call the office immediately if the following severe adverse effects occur:  hearing changes, easy bruising/bleeding, severe headache, or vision changes.  The patient verbalized understanding of the proper use and possible adverse effects of minocycline.  All of the patient's questions and concerns were addressed. Eucrisa Counseling: Patient may experience a mild burning sensation during topical application. Eucrisa is not approved in children less than 2 years of age. Topical Clindamycin Pregnancy And Lactation Text: This medication is Pregnancy Category B and is considered safe during pregnancy. It is unknown if it is excreted in breast milk. Doxepin Counseling:  Patient advised that the medication is sedating and not to drive a car after taking this medication. Patient informed of potential adverse effects including but not limited to dry mouth, urinary retention, and blurry vision.  The patient verbalized understanding of the proper use and possible adverse effects of doxepin.  All of the patient's questions and concerns were addressed. Bexarotene Pregnancy And Lactation Text: This medication is Pregnancy Category X and should not be given to women who are pregnant or may become pregnant. This medication should not be used if you are breast feeding. Ilumya Counseling: I discussed with the patient the risks of tildrakizumab including but not limited to immunosuppression, malignancy, posterior leukoencephalopathy syndrome, and serious infections.  The patient understands that monitoring is required including a PPD at baseline and must alert us or the primary physician if symptoms of infection or other concerning signs are noted. Rituxan Counseling:  I discussed with the patient the risks of Rituxan infusions. Side effects can include infusion reactions, severe drug rashes including mucocutaneous reactions, reactivation of latent hepatitis and other infections and rarely progressive multifocal leukoencephalopathy.  All of the patient's questions and concerns were addressed. Drysol Pregnancy And Lactation Text: This medication is considered safe during pregnancy and breast feeding. Topical Clindamycin Counseling: Patient counseled that this medication may cause skin irritation or allergic reactions.  In the event of skin irritation, the patient was advised to reduce the amount of the drug applied or use it less frequently.   The patient verbalized understanding of the proper use and possible adverse effects of clindamycin.  All of the patient's questions and concerns were addressed. SSKI Counseling:  I discussed with the patient the risks of SSKI including but not limited to thyroid abnormalities, metallic taste, GI upset, fever, headache, acne, arthralgias, paraesthesias, lymphadenopathy, easy bleeding, arrhythmias, and allergic reaction. Rifampin Counseling: I discussed with the patient the risks of rifampin including but not limited to liver damage, kidney damage, red-orange body fluids, nausea/vomiting and severe allergy. Sski Pregnancy And Lactation Text: This medication is Pregnancy Category D and isn't considered safe during pregnancy. It is excreted in breast milk. Cephalexin Counseling: I counseled the patient regarding use of cephalexin as an antibiotic for prophylactic and/or therapeutic purposes. Cephalexin (commonly prescribed under brand name Keflex) is a cephalosporin antibiotic which is active against numerous classes of bacteria, including most skin bacteria. Side effects may include nausea, diarrhea, gastrointestinal upset, rash, hives, yeast infections, and in rare cases, hepatitis, kidney disease, seizures, fever, confusion, neurologic symptoms, and others. Patients with severe allergies to penicillin medications are cautioned that there is about a 10% incidence of cross-reactivity with cephalosporins. When possible, patients with penicillin allergies should use alternatives to cephalosporins for antibiotic therapy. Azathioprine Counseling:  I discussed with the patient the risks of azathioprine including but not limited to myelosuppression, immunosuppression, hepatotoxicity, lymphoma, and infections.  The patient understands that monitoring is required including baseline LFTs, Creatinine, possible TPMP genotyping and weekly CBCs for the first month and then every 2 weeks thereafter.  The patient verbalized understanding of the proper use and possible adverse effects of azathioprine.  All of the patient's questions and concerns were addressed. Isotretinoin Counseling: Patient should get monthly blood tests, not donate blood, not drive at night if vision affected, not share medication, and not undergo elective surgery for 6 months after tx completed. Side effects reviewed, pt to contact office should one occur. Cyclosporine Counseling:  I discussed with the patient the risks of cyclosporine including but not limited to hypertension, gingival hyperplasia,myelosuppression, immunosuppression, liver damage, kidney damage, neurotoxicity, lymphoma, and serious infections. The patient understands that monitoring is required including baseline blood pressure, CBC, CMP, lipid panel and uric acid, and then 1-2 times monthly CMP and blood pressure. Cyclophosphamide Pregnancy And Lactation Text: This medication is Pregnancy Category D and it isn't considered safe during pregnancy. This medication is excreted in breast milk. Nsaids Counseling: NSAID Counseling: I discussed with the patient that NSAIDs should be taken with food. Prolonged use of NSAIDs can result in the development of stomach ulcers.  Patient advised to stop taking NSAIDs if abdominal pain occurs.  The patient verbalized understanding of the proper use and possible adverse effects of NSAIDs.  All of the patient's questions and concerns were addressed. Albendazole Counseling:  I discussed with the patient the risks of albendazole including but not limited to cytopenia, kidney damage, nausea/vomiting and severe allergy.  The patient understands that this medication is being used in an off-label manner. Prednisone Counseling:  I discussed with the patient the risks of prolonged use of prednisone including but not limited to weight gain, insomnia, osteoporosis, mood changes, diabetes, susceptibility to infection, glaucoma and high blood pressure.  In cases where prednisone use is prolonged, patients should be monitored with blood pressure checks, serum glucose levels and an eye exam.  Additionally, the patient may need to be placed on GI prophylaxis, PCP prophylaxis, and calcium and vitamin D supplementation and/or a bisphosphonate.  The patient verbalized understanding of the proper use and the possible adverse effects of prednisone.  All of the patient's questions and concerns were addressed. Infliximab Counseling:  I discussed with the patient the risks of infliximab including but not limited to myelosuppression, immunosuppression, autoimmune hepatitis, demyelinating diseases, lymphoma, and serious infections.  The patient understands that monitoring is required including a PPD at baseline and must alert us or the primary physician if symptoms of infection or other concerning signs are noted. Protopic Pregnancy And Lactation Text: This medication is Pregnancy Category C. It is unknown if this medication is excreted in breast milk when applied topically. Clofazimine Counseling:  I discussed with the patient the risks of clofazimine including but not limited to skin and eye pigmentation, liver damage, nausea/vomiting, gastrointestinal bleeding and allergy. Ketoconazole Pregnancy And Lactation Text: This medication is Pregnancy Category C and it isn't know if it is safe during pregnancy. It is also excreted in breast milk and breast feeding isn't recommended. Colchicine Counseling:  Patient counseled regarding adverse effects including but not limited to stomach upset (nausea, vomiting, stomach pain, or diarrhea).  Patient instructed to limit alcohol consumption while taking this medication.  Colchicine may reduce blood counts especially with prolonged use.  The patient understands that monitoring of kidney function and blood counts may be required, especially at baseline. The patient verbalized understanding of the proper use and possible adverse effects of colchicine.  All of the patient's questions and concerns were addressed. Birth Control Pills Counseling: Birth Control Pill Counseling: I discussed with the patient the potential side effects of OCPs including but not limited to increased risk of stroke, heart attack, thrombophlebitis, deep venous thrombosis, hepatic adenomas, breast changes, GI upset, headaches, and depression.  The patient verbalized understanding of the proper use and possible adverse effects of OCPs. All of the patient's questions and concerns were addressed. Hydroxychloroquine Pregnancy And Lactation Text: This medication has been shown to cause fetal harm but it isn't assigned a Pregnancy Risk Category. There are small amounts excreted in breast milk. Solaraze Pregnancy And Lactation Text: This medication is Pregnancy Category B and is considered safe. There is some data to suggest avoiding during the third trimester. It is unknown if this medication is excreted in breast milk. Cosentyx Counseling:  I discussed with the patient the risks of Cosentyx including but not limited to worsening of Crohn's disease, immunosuppression, allergic reactions and infections.  The patient understands that monitoring is required including a PPD at baseline and must alert us or the primary physician if symptoms of infection or other concerning signs are noted. Erivedge Counseling- I discussed with the patient the risks of Erivedge including but not limited to nausea, vomiting, diarrhea, constipation, weight loss, changes in the sense of taste, decreased appetite, muscle spasms, and hair loss.  The patient verbalized understanding of the proper use and possible adverse effects of Erivedge.  All of the patient's questions and concerns were addressed.

## 2024-09-10 NOTE — HISTORY OF PRESENT ILLNESS
[de-identified] : ID: Mr. KATE RUIZ is a 69 year old male who presents for evaluation in our [facility] for 2nd medical oncology opinion. Pt is Mandarin speaking, daughter Kimberli assisted with history. 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 Med/onc Dr Tanisha Dixon at [facility].  Chronological Hx of Cancer:  10/17/2022: Initial CT demonstrated a 1.8 x 1.4 cm pancreatic body mass. 2022: EUS/FNA  pathology showing invasive adenocarcinoma. Patient was evaluated by surgical/medical oncology at [facility] and felt to have a borderline resectable cancer and was recommended to receive neoadjuvant chemotherapy. He received 3 cycles of mFOLFIRINOX neoadjuvant chemotherapy. 2023: Interval imaging showed interval enlargement of the pancreas cancer to 2. x 1.8 cm, now with posteriorly involving splenic artery/vein and celiac axis. There was no evidence of distant metastasis. 2023: He is s/p celiac nerve block  2023: He then started Xeloda/radiation treatment 5040cGy which he completed 2023. 2023: CT pancreas protocol showed stable 2.0 cm pancreas body cancer with celiac involvement. 2023: Due to unresectability, he started FOLXFOX for 2 cycles. 2023: CT evaluation showed stable 1.5 cm pancreas cancer but with encasement of celiac axis. No metastatic disease. 2024: CT evaluation showed interval enlargement of the pancreas cancer to 3.3 cm with increased encasement of celiac axis with splenic vein thrombosis. No obvious metastatic disease. Of note, pt saw surg/onc here Dr Alonso and he was reviewed a pancreas tumor board in 2024, team discussed unresectable tumor, consider switching agents due to POD. Continued chemotherapy at [facility] with 2nd line Gemcitabine/Abraxane (documented Botensilimab - but patient denies being a part of clinical trial) 2024 Completed SBRT to pancreas total dose of 3500cGy in 5 fractions. 24: CT A/P: Pancreatic body tumor with extensive vascular involvement not significantly changed from recent prior imaging 2024 however demonstrating significant progression when compared with remote prior imaging dating to 2023.  09/10/24: Reports chronic LLQ pain is worsening recently, has fentanyl patch 72 mcg and takes hydromorphone PRN. Reports poor appetite and nausea. Initially had weight loss, now maintaining. On Creon, denies steatorrhea. Has frequent constipation managed with a bowel regimen, last BM yesterday. He has fatigue and spends most of the day in bed.  Daughter believes he had genetic testing, we do not have results at this time. Also do not see foundation testing on available records. ECO  MMR status (all GI cancers): Germline Data (if pancreas or young): Signatera/ctDNA testing:   PMHx: DM2, Afib (on Eliquis), CAD/stent x 2. Social Hx: no alcohol, no smoking, Worked at a restaurant years ago Fam Hx : Mother: tongue cancer, Father: lung cancer Goals of Care considered: Broached topic of considering comfort care / hospice evaluation Pancreatic enyzmes considered <if pancreas>: Creon Palliative care referral considered: Yes

## 2024-09-10 NOTE — ASSESSMENT
[FreeTextEntry1] : Mr. KATE RUIZ is a 69-year-old male who presents for evaluation in our Pancreas [facility] for 2nd medical oncology opinion. Pt is Mandarin speaking, daughter Kimberli assisted with history. 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 Med/onc Dr Tanisha Dixon at [facility]. We do not have molecular testing data today for review, other than a Guardant test which showed no mutations; do not see evidence of tissue molecular testing which may suggest that there was insufficient tissue on tissue testing.   We discussed that it is unclear why he has such significant LLQ abdominal pain, without any clear sign of LLQ pathology. Reports abdominal pain is relieved with bowel movement. Reports alternating diarrhea and constipation.  In clinic today he is ECOG 3 or worse; he spends 75%+ of his time lying in bed resting and in pain. Given his presentation today, closer follow-up with palliative care is likely to be beneficial. Recommend close Palliative Care follow up to optimize pain regimen - perhaps fentanyl pain patch less effective with recent weight loss. We believe cytotoxic chemotherapy is likely to cause more harm than benefit given his performance status. He does not have adequate performance status for clinical trials. Options include continuing with low intensity chemotherapy, or transition to more comfort-based care and hospice evaluation at this time.  Thanks for the opportunity to participate in the care of this patient.  Bret Fernandez MD PhD Medical Oncology Attending I personally have spent a total of 60 minutes of time on the date of this encounter reviewing test results, documenting findings, coordinating care, and directly consulting with the patient and/or designated family member. I was present with the trainee during the key portions of the history and exam. I agree with the findings and plan as documented above.

## 2024-09-10 NOTE — REVIEW OF SYSTEMS
[Abdominal Pain] : abdominal pain [Constipation] : constipation [Diarrhea] : diarrhea [Negative] : Heme/Lymph [FreeTextEntry7] : nausea, decreased appetite

## 2024-09-10 NOTE — PHYSICAL EXAM
[Capable of only limited self care, confined to bed or chair more than 50% of waking hours] : Status 3- Capable of only limited self care, confined to bed or chair more than 50% of waking hours [Thin] : thin [Normal] : normal appearance, no rash, nodules, vesicles, ulcers, erythema [de-identified] : +mild tenderness to palpation in LLQ

## 2024-09-10 NOTE — REASON FOR VISIT
[Initial Consultation] : an initial pain management consultation [Family Member] : family member [FreeTextEntry2] : Initial Consultation for pain management

## 2024-09-10 NOTE — HISTORY OF PRESENT ILLNESS
[FreeTextEntry1] : INITIAL CONSULTATION: JUNE 6,2024 Mr. 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 EUS/FNA 11/04/2022 demonstrated invasive adenocarcinoma. Patient was evaluated by surgical/medical oncology at [facility] and felt to have a borderline resectable cancer and was recommended to receive neoadjuvant chemotherapy. He received 3 cycles of mFOLFIRINOX neoadjuvant chemotherapy. Interval imaging 01/23/2023 showed interval enlargement of the pancreas cancer to 2. x 1.8 cm, now with posteriorly involving splenic artery/vein and celiac axis. There was no evidence of distant metastasis. He is s/p celiac nerve block 02/06/2023. He then started Xeloda/radiation treatment 5040cGy 02/24/2023 which he completed 03/29/2023. CT pancreas protocol 06/02/2023 showed stable 2.0 cm pancreas body cancer with celiac involvement. Due to unresectability, he started FOLXFOX 06/05/2023 for 2 cycles. CT evaluation 11/20/2023 showed stable 1.5 cm pancreas cancer but with encasement of celiac axis. No metastatic disease. 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 No obvious metastatic disease. He is continuing with chemotherapy with Gemcitabine/Abraxane +/- Botensilimab.  He's here today with dtr as . C/o abdominal pain radiating to back.   7/17/24: CT chest, abdomen, pelvis Pancreatic body mass, increased in size. New RP lymphadenopathy vs direct extension of the pancreatic mass. 5mm RUL groundglass pulmonary nodule, not significantly changed, most likely benign.  7/22/2024 Completed SBRT to pancreas total dose of 3500cGy in 5 fractions.  Presents today for follow up. He continues to have LLQ pain. He is on pain medication, managed by the pain management team at [facility]. He has nausea but is taking medication for it as well. His stools are light and is taking creon. Dr Bronson ([facility]) Surgeon Dr Alonso/[facility] Dr Dixon ([facility])

## 2024-09-10 NOTE — PHYSICAL EXAM
[General Appearance - Well Developed] : well developed [General Appearance - Well Nourished] : well nourished [Sclera] : the sclera and conjunctiva were normal [Extraocular Movements] : extraocular movements were intact [Outer Ear] : the ears and nose were normal in appearance [Hearing Threshold Finger Rub Not [place]] : hearing was normal [Examination Of The Oral Cavity] : the lips and gums were normal [] : no respiratory distress [Exaggerated Use Of Accessory Muscles For Inspiration] : no accessory muscle use [Skin Color & Pigmentation] : normal skin color and pigmentation [No Focal Deficits] : no focal deficits [Oriented To Time, Place, And Person] : oriented to person, place, and time [de-identified] : abdominal pain in the LLQ, mildly distended with gas

## 2024-09-10 NOTE — HISTORY OF PRESENT ILLNESS
[FreeTextEntry1] : INITIAL CONSULTATION: JUNE 6,2024 Mr. 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 EUS/FNA 11/04/2022 demonstrated invasive adenocarcinoma. Patient was evaluated by surgical/medical oncology at [facility] and felt to have a borderline resectable cancer and was recommended to receive neoadjuvant chemotherapy. He received 3 cycles of mFOLFIRINOX neoadjuvant chemotherapy. Interval imaging 01/23/2023 showed interval enlargement of the pancreas cancer to 2. x 1.8 cm, now with posteriorly involving splenic artery/vein and celiac axis. There was no evidence of distant metastasis. He is s/p celiac nerve block 02/06/2023. He then started Xeloda/radiation treatment 5040cGy 02/24/2023 which he completed 03/29/2023. CT pancreas protocol 06/02/2023 showed stable 2.0 cm pancreas body cancer with celiac involvement. Due to unresectability, he started FOLXFOX 06/05/2023 for 2 cycles. CT evaluation 11/20/2023 showed stable 1.5 cm pancreas cancer but with encasement of celiac axis. No metastatic disease. 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 No obvious metastatic disease. He is continuing with chemotherapy with Gemcitabine/Abraxane +/- Botensilimab.  He's here today with dtr as . C/o abdominal pain radiating to back.   7/17/24: CT chest, abdomen, pelvis Pancreatic body mass, increased in size. New RP lymphadenopathy vs direct extension of the pancreatic mass. 5mm RUL groundglass pulmonary nodule, not significantly changed, most likely benign.  7/22/2024 Completed SBRT to pancreas total dose of 3500cGy in 5 fractions.  Presents today for follow up. He continues to have LLQ pain. He is on pain medication, managed by the pain management team at [facility]. He has nausea but is taking medication for it as well. His stools are light and is taking creon. Dr Bronson ([facility]) Surgeon Dr Alonso/[facility] Dr Dixon ([facility])

## 2024-09-10 NOTE — REVIEW OF SYSTEMS
[Recent Change In Weight] : ~T recent weight change [Abdominal Pain] : abdominal pain [Constipation] : constipation [Fever] : no fever [Chills] : no chills [Dysphagia] : no dysphagia [Chest Pain] : no chest pain [Shortness Of Breath] : no shortness of breath [Vomiting] : no vomiting [Skin Rash] : no skin rash [Easy Bleeding] : no tendency for easy bleeding [FreeTextEntry2] : +weight loss

## 2024-09-10 NOTE — ASSESSMENT
[FreeTextEntry1] : 69-year-old female presents with a PMH of pancreatic cancer presents with abdominal pain, primarily in the LLQ.  The patient is seeking a second medical oncology opinion and is currently followed by palliative medicine at [facility].    Plan: 1. At this time, we do not know if another celiac plexus block will be beneficial.  2. The daughter will continue with palliative at [facility]. She has our office information should the patient need any assistance with pain management in the future.

## 2024-09-10 NOTE — HISTORY OF PRESENT ILLNESS
[FreeTextEntry1] : Annie Cifuentes is a 69-year-old male who presents for an initial evaluation of abdominal pain. He has a PMH of DM II and Afib (on Eliquis). He was diagnosed with pancreatic adenocarcinoma in October of 2022. He was receiving chemo and immunotherapy in [facility] and is being seen today in [facility] for a second medical oncology opinion. The patient is accompanied by his wife and daughter. The patient is Mandarin speaking, and his daughter is assisting with translation.   The patient states he has been experiencing abdominal pain related to his diagnosis for over a year. In the past he has had two celiac plexus blocks in [facility], but they were not helpful for a long period. The daughter does not know if neurolysis was done during either procedure. Currently the pain is mainly in his LLQ and sometimes it radiates to his left back. The pain is intermittent and sharp in nature. At best the pain is a 0/10 and at worst it is a 10/10. He believes the pain is more related to being constipated. He is on a bowel regimen. When he has a bowel movement it slightly relieves the pain. He states he takes enzymes. He currently follows with palliative medicine at [facility]. He is using Fentanyl TDP 75 mcg every 72 hours, Dilaudid 4 mg every four hours as needed, and Tylenol 1000 mg PRN. In the past he has used, oxycodone, oxycontin, and MS Contin, but they have not been helpful. The patient states he is in more pain today because he did not change his fentanyl patch this morning.

## 2024-09-10 NOTE — HISTORY OF PRESENT ILLNESS
[de-identified] : ID: Mr. KATE RUIZ is a 69 year old male who presents for evaluation in our [facility] for 2nd medical oncology opinion. Pt is Mandarin speaking, daughter Kimberli assisted with history. 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 Med/onc Dr Tanisha Dixon at [facility].  Chronological Hx of Cancer:  10/17/2022: Initial CT demonstrated a 1.8 x 1.4 cm pancreatic body mass. 2022: EUS/FNA  pathology showing invasive adenocarcinoma. Patient was evaluated by surgical/medical oncology at [facility] and felt to have a borderline resectable cancer and was recommended to receive neoadjuvant chemotherapy. He received 3 cycles of mFOLFIRINOX neoadjuvant chemotherapy. 2023: Interval imaging showed interval enlargement of the pancreas cancer to 2. x 1.8 cm, now with posteriorly involving splenic artery/vein and celiac axis. There was no evidence of distant metastasis. 2023: He is s/p celiac nerve block  2023: He then started Xeloda/radiation treatment 5040cGy which he completed 2023. 2023: CT pancreas protocol showed stable 2.0 cm pancreas body cancer with celiac involvement. 2023: Due to unresectability, he started FOLXFOX for 2 cycles. 2023: CT evaluation showed stable 1.5 cm pancreas cancer but with encasement of celiac axis. No metastatic disease. 2024: CT evaluation showed interval enlargement of the pancreas cancer to 3.3 cm with increased encasement of celiac axis with splenic vein thrombosis. No obvious metastatic disease. Of note, pt saw surg/onc here Dr Alonso and he was reviewed a pancreas tumor board in 2024, team discussed unresectable tumor, consider switching agents due to POD. Continued chemotherapy at [facility] with 2nd line Gemcitabine/Abraxane (documented Botensilimab - but patient denies being a part of clinical trial) 2024 Completed SBRT to pancreas total dose of 3500cGy in 5 fractions. 24: CT A/P: Pancreatic body tumor with extensive vascular involvement not significantly changed from recent prior imaging 2024 however demonstrating significant progression when compared with remote prior imaging dating to 2023.  09/10/24: Reports chronic LLQ pain is worsening recently, has fentanyl patch 72 mcg and takes hydromorphone PRN. Reports poor appetite and nausea. Initially had weight loss, now maintaining. On Creon, denies steatorrhea. Has frequent constipation managed with a bowel regimen, last BM yesterday. He has fatigue and spends most of the day in bed.  Daughter believes he had genetic testing, we do not have results at this time. Also do not see foundation testing on available records. ECO  MMR status (all GI cancers): Germline Data (if pancreas or young): Signatera/ctDNA testing:   PMHx: DM2, Afib (on Eliquis), CAD/stent x 2. Social Hx: no alcohol, no smoking, Worked at a restaurant years ago Fam Hx : Mother: tongue cancer, Father: lung cancer Goals of Care considered: Broached topic of considering comfort care / hospice evaluation Pancreatic enyzmes considered <if pancreas>: Creon Palliative care referral considered: Yes

## 2024-09-10 NOTE — HISTORY OF PRESENT ILLNESS
[Abdominal Pain] : abdominal pain [EUS] : EUS [Biopsy] : biopsy performed [] : This is a second opinion [N/A] : N/A [Pancreatic ductal adenocarcinoma] : Pancreatic ductal adenocarcinoma [Locally advanced pancreas cancer (LAPC 3)] : Locally advanced pancreas cancer (LAPC 3) [Nutrition] : Nutrition [Social Work] : Social Work [Other: ____] : [unfilled] [Ambulatory and capable of all self care but unable to carry out any work activities] : Status 2 - Ambulatory and capable of all self care but unable to carry out any work activities. Up and about more than 50% of waking hours [de-identified] : This is a non-billable note*   Mr. KATE RUIZ is a 69 year old male who presents for evaluation in our [facility] for 2nd medical oncology opinion.  Pt is Mandarin speaking, daughter Kimberli assisted with history.  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  Med/onc Dr Tanisha Dixon at [facility]  Additional PMH of DM2, Afib (on Eliquis), CAD/stent x 2.  Initial CT 10/17/2022 demonstrated a 1.8 x 1.4 cm pancreatic body mass.  EUS/FNA 2022 pathology showing invasive adenocarcinoma.  Patient was evaluated by surgical/medical oncology at [facility] and felt to have a borderline resectable cancer and was recommended to receive neoadjuvant chemotherapy.  He received 3 cycles of mFOLFIRINOX neoadjuvant chemotherapy.  Interval imaging 2023 showed interval enlargement of the pancreas cancer to 2. x 1.8 cm, now with posteriorly involving splenic artery/vein and celiac axis. There was no evidence of distant metastasis.  He is s/p celiac nerve block 2023.  He then started Xeloda/radiation treatment 5040cGy 2023 which he completed 2023.  CT pancreas protocol 2023 showed stable 2.0 cm pancreas body cancer with celiac involvement.  Due to unresectability, he started FOLXFOX 2023 for 2 cycles.  CT evaluation 2023 showed stable 1.5 cm pancreas cancer but with encasement of celiac axis. No metastatic disease.  CT evaluation 2024 showed interval enlargement of the pancreas cancer to 3.3 cm with increased encasement of celiac axis with splenic vein thrombosis. No obvious metastatic disease.  Of note, pt saw surg/onc here Dr Alonso and he was reviewed a pancreas tumor board in 2024, team discussed unresectable tumor, consider switching agents due to POD.  Continued chemotherapy at [facility] with 2nd line Gemcitabine/Abraxane +/- Botensilimab.  + side effects of fatigue, received 85% of dose.  (last dose 24).  2024 Completed SBRT to pancreas total dose of 3500cGy in 5 fractions.  Reports LLQ pain, has fentanyl patch 72 mcg and takes hydromorphone PRN.   Initially had weight loss, now maintaining. On Creon, denies steatorrhea. Has frequent constipation.  Daughter believes he had genetic testing, we do not have results at this time. Also do not see foundation testing on available records.    ECO-3 Family ca hx: mother- tongue ca, father - lung ca  Labs:   5/10/24 Ca 19-9:  215  Tbili: 0.7  [TextBox_4] : 10/2022 [TextBox_39] : [id number]  [TextBox_41] : adenocarcinoma  [TextBox_43] : 11/04/2022 [body] : body [Control of disease progression (no chance of resection)] : control of disease progression (no chance of resection) [TextBox_5] : 09/10/2024 [TextBox_38] : 215 [FreeTextEntry4] : 0.7 [TextBox_56] : 36 [FreeTextEntry6] : No current trials Obtain genetic testing/ foundation testing results

## 2024-09-10 NOTE — PHYSICAL EXAM
[General Appearance - Well Developed] : well developed [General Appearance - Well Nourished] : well nourished [Sclera] : the sclera and conjunctiva were normal [Extraocular Movements] : extraocular movements were intact [Outer Ear] : the ears and nose were normal in appearance [Hearing Threshold Finger Rub Not [place]] : hearing was normal [Examination Of The Oral Cavity] : the lips and gums were normal [] : no respiratory distress [Exaggerated Use Of Accessory Muscles For Inspiration] : no accessory muscle use [Skin Color & Pigmentation] : normal skin color and pigmentation [No Focal Deficits] : no focal deficits [Oriented To Time, Place, And Person] : oriented to person, place, and time [de-identified] : abdominal pain in the LLQ, mildly distended with gas

## 2024-09-10 NOTE — PHYSICAL EXAM
[Normal muscle bulk without asymmetry] : normal muscle bulk without asymmetry [Normal] : Normal affect [de-identified] : No SOB noted [de-identified] : Cranial nerves grossly intact  [de-identified] : Skin color, texture, and turgor normal

## 2024-09-10 NOTE — REASON FOR VISIT
[Other: ______] : provided by DEWAYNE [Interpreters_Relationshiptopatient] : daughter [TWNoteComboBox1] : Tha

## 2024-09-10 NOTE — PHYSICAL EXAM
[Capable of only limited self care, confined to bed or chair more than 50% of waking hours] : Status 3- Capable of only limited self care, confined to bed or chair more than 50% of waking hours [Thin] : thin [Normal] : normal appearance, no rash, nodules, vesicles, ulcers, erythema [de-identified] : +mild tenderness to palpation in LLQ

## 2024-09-10 NOTE — PHYSICAL EXAM
[General Appearance - Well Developed] : well developed [General Appearance - Well Nourished] : well nourished [Sclera] : the sclera and conjunctiva were normal [Extraocular Movements] : extraocular movements were intact [Outer Ear] : the ears and nose were normal in appearance [Hearing Threshold Finger Rub Not [place]] : hearing was normal [Examination Of The Oral Cavity] : the lips and gums were normal [] : no respiratory distress [Exaggerated Use Of Accessory Muscles For Inspiration] : no accessory muscle use [Skin Color & Pigmentation] : normal skin color and pigmentation [No Focal Deficits] : no focal deficits [Oriented To Time, Place, And Person] : oriented to person, place, and time [de-identified] : abdominal pain in the LLQ, mildly distended with gas

## 2024-09-10 NOTE — HISTORY OF PRESENT ILLNESS
[Abdominal Pain] : abdominal pain [EUS] : EUS [Biopsy] : biopsy performed [] : This is a second opinion [N/A] : N/A [Pancreatic ductal adenocarcinoma] : Pancreatic ductal adenocarcinoma [Locally advanced pancreas cancer (LAPC 3)] : Locally advanced pancreas cancer (LAPC 3) [Nutrition] : Nutrition [Social Work] : Social Work [Other: ____] : [unfilled] [Ambulatory and capable of all self care but unable to carry out any work activities] : Status 2 - Ambulatory and capable of all self care but unable to carry out any work activities. Up and about more than 50% of waking hours [de-identified] : This is a non-billable note*   Mr. KATE RUIZ is a 69 year old male who presents for evaluation in our Pan[facility] for 2nd medical oncology opinion.  Pt is Mandarin speaking, daughter Kimberli assisted with history.  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  Med/onc Dr Tanisha Dixon at [facility]-.  Additional PMH of DM2, Afib (on Eliquis), CAD/stent x 2.  Initial CT 10/17/2022 demonstrated a 1.8 x 1.4 cm pancreatic body mass.  EUS/FNA 2022 pathology showing invasive adenocarcinoma.  Patient was evaluated by surgical/medical oncology at [facility] and felt to have a borderline resectable cancer and was recommended to receive neoadjuvant chemotherapy.  He received 3 cycles of mFOLFIRINOX neoadjuvant chemotherapy.  Interval imaging 2023 showed interval enlargement of the pancreas cancer to 2. x 1.8 cm, now with posteriorly involving splenic artery/vein and celiac axis. There was no evidence of distant metastasis.  He is s/p celiac nerve block 2023.  He then started Xeloda/radiation treatment 5040cGy 2023 which he completed 2023.  CT pancreas protocol 2023 showed stable 2.0 cm pancreas body cancer with celiac involvement.  Due to unresectability, he started FOLXFOX 2023 for 2 cycles.  CT evaluation 2023 showed stable 1.5 cm pancreas cancer but with encasement of celiac axis. No metastatic disease.  CT evaluation 2024 showed interval enlargement of the pancreas cancer to 3.3 cm with increased encasement of celiac axis with splenic vein thrombosis. No obvious metastatic disease.  Of note, pt saw surg/onc here Dr Alonso and he was reviewed a pancreas tumor board in 2024, team discussed unresectable tumor, consider switching agents due to POD.  Continued chemotherapy at [facility] with 2nd line Gemcitabine/Abraxane +/- Botensilimab.  + side effects of fatigue, received 85% of dose.  (last dose 24).  2024 Completed SBRT to pancreas total dose of 3500cGy in 5 fractions.  Reports LLQ pain, has fentanyl patch 72 mcg and takes hydromorphone PRN.   Initially had weight loss, now maintaining. On Creon, denies steatorrhea. Has frequent constipation.  Daughter believes he had genetic testing, we do not have results at this time. Also do not see foundation testing on available records.    ECO-3 Family ca hx: mother- tongue ca, father - lung ca  Labs:   5/10/24 Ca 19-9:  215  Tbili: 0.7  [TextBox_4] : 10/2022 [TextBox_39] : [id number]  [TextBox_41] : adenocarcinoma  [TextBox_43] : 11/04/2022 [body] : body [Control of disease progression (no chance of resection)] : control of disease progression (no chance of resection) [TextBox_5] : 09/10/2024 [TextBox_38] : 215 [FreeTextEntry4] : 0.7 [TextBox_56] : 36 [FreeTextEntry6] : No current trials Obtain genetic testing/ foundation testing results

## 2024-09-11 ENCOUNTER — NON-APPOINTMENT (OUTPATIENT)
Age: 69
End: 2024-09-11

## 2024-09-12 ENCOUNTER — NON-APPOINTMENT (OUTPATIENT)
Age: 69
End: 2024-09-12

## 2024-10-10 ENCOUNTER — APPOINTMENT (OUTPATIENT)
Dept: RADIATION ONCOLOGY | Facility: CLINIC | Age: 69
End: 2024-10-10

## 2024-10-10 VITALS
SYSTOLIC BLOOD PRESSURE: 122 MMHG | HEIGHT: 69 IN | OXYGEN SATURATION: 99 % | DIASTOLIC BLOOD PRESSURE: 77 MMHG | HEART RATE: 56 BPM | BODY MASS INDEX: 18.63 KG/M2 | WEIGHT: 125.77 LBS | RESPIRATION RATE: 16 BRPM | TEMPERATURE: 98.4 F

## 2024-10-10 PROCEDURE — 99213 OFFICE O/P EST LOW 20 MIN: CPT | Mod: GC

## 2024-10-23 ENCOUNTER — APPOINTMENT (OUTPATIENT)
Dept: NUCLEAR MEDICINE | Facility: IMAGING CENTER | Age: 69
End: 2024-10-23

## 2024-10-23 ENCOUNTER — OUTPATIENT (OUTPATIENT)
Dept: OUTPATIENT SERVICES | Facility: HOSPITAL | Age: 69
LOS: 1 days | End: 2024-10-23
Payer: MEDICARE

## 2024-10-23 DIAGNOSIS — Z95.5 PRESENCE OF CORONARY ANGIOPLASTY IMPLANT AND GRAFT: Chronic | ICD-10-CM

## 2024-10-23 DIAGNOSIS — Z00.8 ENCOUNTER FOR OTHER GENERAL EXAMINATION: ICD-10-CM

## 2024-10-23 DIAGNOSIS — C25.1 MALIGNANT NEOPLASM OF BODY OF PANCREAS: ICD-10-CM

## 2024-10-23 PROCEDURE — 78815 PET IMAGE W/CT SKULL-THIGH: CPT | Mod: 26,PS

## 2024-10-23 PROCEDURE — A9552: CPT

## 2024-10-23 PROCEDURE — 78815 PET IMAGE W/CT SKULL-THIGH: CPT

## 2024-11-04 ENCOUNTER — APPOINTMENT (OUTPATIENT)
Dept: RADIATION ONCOLOGY | Facility: CLINIC | Age: 69
End: 2024-11-04

## 2024-11-04 VITALS
SYSTOLIC BLOOD PRESSURE: 136 MMHG | TEMPERATURE: 96.98 F | OXYGEN SATURATION: 99 % | DIASTOLIC BLOOD PRESSURE: 71 MMHG | WEIGHT: 130.95 LBS | BODY MASS INDEX: 19.4 KG/M2 | HEIGHT: 69 IN | HEART RATE: 54 BPM | RESPIRATION RATE: 16 BRPM

## 2024-11-04 PROCEDURE — 99213 OFFICE O/P EST LOW 20 MIN: CPT | Mod: GC

## 2024-11-22 ENCOUNTER — OUTPATIENT (OUTPATIENT)
Dept: OUTPATIENT SERVICES | Facility: HOSPITAL | Age: 69
LOS: 1 days | Discharge: HOME | End: 2024-11-22

## 2024-11-22 DIAGNOSIS — Z95.5 PRESENCE OF CORONARY ANGIOPLASTY IMPLANT AND GRAFT: Chronic | ICD-10-CM

## 2024-11-22 DIAGNOSIS — C25.9 MALIGNANT NEOPLASM OF PANCREAS, UNSPECIFIED: ICD-10-CM

## 2024-12-12 ENCOUNTER — RESULT REVIEW (OUTPATIENT)
Age: 69
End: 2024-12-12

## 2024-12-12 ENCOUNTER — APPOINTMENT (OUTPATIENT)
Dept: HEMATOLOGY ONCOLOGY | Facility: CLINIC | Age: 69
End: 2024-12-12
Payer: MEDICARE

## 2024-12-12 VITALS
DIASTOLIC BLOOD PRESSURE: 67 MMHG | RESPIRATION RATE: 16 BRPM | HEIGHT: 68.86 IN | WEIGHT: 125 LBS | OXYGEN SATURATION: 100 % | SYSTOLIC BLOOD PRESSURE: 115 MMHG | HEART RATE: 71 BPM | TEMPERATURE: 97 F | BODY MASS INDEX: 18.51 KG/M2

## 2024-12-12 LAB
BASOPHILS # BLD AUTO: 0.02 K/UL — SIGNIFICANT CHANGE UP (ref 0–0.2)
BASOPHILS NFR BLD AUTO: 0.5 % — SIGNIFICANT CHANGE UP (ref 0–2)
EOSINOPHIL # BLD AUTO: 0.08 K/UL — SIGNIFICANT CHANGE UP (ref 0–0.5)
EOSINOPHIL NFR BLD AUTO: 2 % — SIGNIFICANT CHANGE UP (ref 0–6)
HCT VFR BLD CALC: 31 % — LOW (ref 39–50)
HGB BLD-MCNC: 9.9 G/DL — LOW (ref 13–17)
IMM GRANULOCYTES NFR BLD AUTO: 0.5 % — SIGNIFICANT CHANGE UP (ref 0–0.9)
LYMPHOCYTES # BLD AUTO: 0.41 K/UL — LOW (ref 1–3.3)
LYMPHOCYTES # BLD AUTO: 10.1 % — LOW (ref 13–44)
MCHC RBC-ENTMCNC: 31.7 PG — SIGNIFICANT CHANGE UP (ref 27–34)
MCHC RBC-ENTMCNC: 31.9 G/DL — LOW (ref 32–36)
MCV RBC AUTO: 99.4 FL — SIGNIFICANT CHANGE UP (ref 80–100)
MONOCYTES # BLD AUTO: 0.37 K/UL — SIGNIFICANT CHANGE UP (ref 0–0.9)
MONOCYTES NFR BLD AUTO: 9.1 % — SIGNIFICANT CHANGE UP (ref 2–14)
NEUTROPHILS # BLD AUTO: 3.15 K/UL — SIGNIFICANT CHANGE UP (ref 1.8–7.4)
NEUTROPHILS NFR BLD AUTO: 77.8 % — HIGH (ref 43–77)
NRBC # BLD: 0 /100 WBCS — SIGNIFICANT CHANGE UP (ref 0–0)
PLATELET # BLD AUTO: 108 K/UL — LOW (ref 150–400)
RBC # BLD: 3.12 M/UL — LOW (ref 4.2–5.8)
RBC # FLD: 13.1 % — SIGNIFICANT CHANGE UP (ref 10.3–14.5)
WBC # BLD: 4.05 K/UL — SIGNIFICANT CHANGE UP (ref 3.8–10.5)
WBC # FLD AUTO: 4.05 K/UL — SIGNIFICANT CHANGE UP (ref 3.8–10.5)

## 2024-12-12 PROCEDURE — 99214 OFFICE O/P EST MOD 30 MIN: CPT

## 2024-12-12 PROCEDURE — G2211 COMPLEX E/M VISIT ADD ON: CPT

## 2024-12-13 LAB
ALBUMIN SERPL ELPH-MCNC: 4 G/DL
ALP BLD-CCNC: 467 U/L
ALT SERPL-CCNC: 61 U/L
ANION GAP SERPL CALC-SCNC: 11 MMOL/L
AST SERPL-CCNC: 59 U/L
BILIRUB SERPL-MCNC: 0.4 MG/DL
BUN SERPL-MCNC: 22 MG/DL
CALCIUM SERPL-MCNC: 9.6 MG/DL
CANCER AG19-9 SERPL-ACNC: 473 U/ML
CEA SERPL-MCNC: 2.2 NG/ML
CHLORIDE SERPL-SCNC: 103 MMOL/L
CO2 SERPL-SCNC: 26 MMOL/L
CREAT SERPL-MCNC: 0.68 MG/DL
EGFR: 99 ML/MIN/1.73M2
GLUCOSE SERPL-MCNC: 106 MG/DL
HBV CORE IGG+IGM SER QL: REACTIVE
HBV SURFACE AB SER QL: ABNORMAL
HBV SURFACE AG SER QL: NONREACTIVE
HCV AB SER QL: NONREACTIVE
HCV S/CO RATIO: 0.11 S/CO
POTASSIUM SERPL-SCNC: 4.5 MMOL/L
PROT SERPL-MCNC: 6.6 G/DL
SODIUM SERPL-SCNC: 140 MMOL/L

## 2024-12-31 ENCOUNTER — RESULT REVIEW (OUTPATIENT)
Age: 69
End: 2024-12-31

## 2024-12-31 ENCOUNTER — INPATIENT (INPATIENT)
Facility: HOSPITAL | Age: 69
LOS: 2 days | Discharge: HOME | End: 2025-01-03
Attending: SURGERY | Admitting: SURGERY
Payer: MEDICARE

## 2024-12-31 VITALS
HEART RATE: 97 BPM | OXYGEN SATURATION: 99 % | HEIGHT: 68.86 IN | WEIGHT: 115.08 LBS | TEMPERATURE: 98 F | SYSTOLIC BLOOD PRESSURE: 133 MMHG | RESPIRATION RATE: 18 BRPM | DIASTOLIC BLOOD PRESSURE: 83 MMHG

## 2024-12-31 DIAGNOSIS — R11.2 NAUSEA WITH VOMITING, UNSPECIFIED: ICD-10-CM

## 2024-12-31 DIAGNOSIS — Z95.5 PRESENCE OF CORONARY ANGIOPLASTY IMPLANT AND GRAFT: Chronic | ICD-10-CM

## 2024-12-31 DIAGNOSIS — K31.1 ADULT HYPERTROPHIC PYLORIC STENOSIS: ICD-10-CM

## 2024-12-31 LAB
A1C WITH ESTIMATED AVERAGE GLUCOSE RESULT: 5.2 % — SIGNIFICANT CHANGE UP (ref 4–5.6)
ADD ON TEST-SPECIMEN IN LAB: SIGNIFICANT CHANGE UP
ALBUMIN SERPL ELPH-MCNC: 3.4 G/DL — SIGNIFICANT CHANGE UP (ref 3.3–5)
ALBUMIN SERPL ELPH-MCNC: 3.4 G/DL — SIGNIFICANT CHANGE UP (ref 3.3–5)
ALP SERPL-CCNC: 526 U/L — HIGH (ref 40–120)
ALP SERPL-CCNC: 529 U/L — HIGH (ref 40–120)
ALT FLD-CCNC: 33 U/L — SIGNIFICANT CHANGE UP (ref 4–41)
ALT FLD-CCNC: 34 U/L — SIGNIFICANT CHANGE UP (ref 4–41)
ANION GAP SERPL CALC-SCNC: 11 MMOL/L — SIGNIFICANT CHANGE UP (ref 7–14)
ANION GAP SERPL CALC-SCNC: 8 MMOL/L — SIGNIFICANT CHANGE UP (ref 7–14)
APTT BLD: 32.5 SEC — SIGNIFICANT CHANGE UP (ref 24.5–35.6)
APTT BLD: 37.1 SEC — HIGH (ref 24.5–35.6)
AST SERPL-CCNC: 42 U/L — HIGH (ref 4–40)
AST SERPL-CCNC: 44 U/L — HIGH (ref 4–40)
BASOPHILS # BLD AUTO: 0.01 K/UL — SIGNIFICANT CHANGE UP (ref 0–0.2)
BASOPHILS NFR BLD AUTO: 0.3 % — SIGNIFICANT CHANGE UP (ref 0–2)
BILIRUB SERPL-MCNC: 0.6 MG/DL — SIGNIFICANT CHANGE UP (ref 0.2–1.2)
BILIRUB SERPL-MCNC: 0.7 MG/DL — SIGNIFICANT CHANGE UP (ref 0.2–1.2)
BLD GP AB SCN SERPL QL: NEGATIVE — SIGNIFICANT CHANGE UP
BUN SERPL-MCNC: 14 MG/DL — SIGNIFICANT CHANGE UP (ref 7–23)
BUN SERPL-MCNC: 15 MG/DL — SIGNIFICANT CHANGE UP (ref 7–23)
CALCIUM SERPL-MCNC: 8.7 MG/DL — SIGNIFICANT CHANGE UP (ref 8.4–10.5)
CALCIUM SERPL-MCNC: 9.1 MG/DL — SIGNIFICANT CHANGE UP (ref 8.4–10.5)
CHLORIDE SERPL-SCNC: 101 MMOL/L — SIGNIFICANT CHANGE UP (ref 98–107)
CHLORIDE SERPL-SCNC: 99 MMOL/L — SIGNIFICANT CHANGE UP (ref 98–107)
CO2 SERPL-SCNC: 25 MMOL/L — SIGNIFICANT CHANGE UP (ref 22–31)
CO2 SERPL-SCNC: 27 MMOL/L — SIGNIFICANT CHANGE UP (ref 22–31)
CREAT SERPL-MCNC: 0.67 MG/DL — SIGNIFICANT CHANGE UP (ref 0.5–1.3)
CREAT SERPL-MCNC: 0.73 MG/DL — SIGNIFICANT CHANGE UP (ref 0.5–1.3)
EGFR: 100 ML/MIN/1.73M2 — SIGNIFICANT CHANGE UP
EGFR: 97 ML/MIN/1.73M2 — SIGNIFICANT CHANGE UP
EOSINOPHIL # BLD AUTO: 0.04 K/UL — SIGNIFICANT CHANGE UP (ref 0–0.5)
EOSINOPHIL NFR BLD AUTO: 1.4 % — SIGNIFICANT CHANGE UP (ref 0–6)
ESTIMATED AVERAGE GLUCOSE: 103 — SIGNIFICANT CHANGE UP
FLUAV AG NPH QL: SIGNIFICANT CHANGE UP
FLUBV AG NPH QL: SIGNIFICANT CHANGE UP
GLUCOSE BLDC GLUCOMTR-MCNC: 108 MG/DL — HIGH (ref 70–99)
GLUCOSE SERPL-MCNC: 108 MG/DL — HIGH (ref 70–99)
GLUCOSE SERPL-MCNC: 113 MG/DL — HIGH (ref 70–99)
HCT VFR BLD CALC: 27.3 % — LOW (ref 39–50)
HCT VFR BLD CALC: 27.6 % — LOW (ref 39–50)
HGB BLD-MCNC: 8.9 G/DL — LOW (ref 13–17)
HGB BLD-MCNC: 9.2 G/DL — LOW (ref 13–17)
IANC: 2.31 K/UL — SIGNIFICANT CHANGE UP (ref 1.8–7.4)
IMM GRANULOCYTES NFR BLD AUTO: 0.3 % — SIGNIFICANT CHANGE UP (ref 0–0.9)
INR BLD: 2.41 RATIO — HIGH (ref 0.85–1.16)
INR BLD: 2.44 RATIO — HIGH (ref 0.85–1.16)
LYMPHOCYTES # BLD AUTO: 0.32 K/UL — LOW (ref 1–3.3)
LYMPHOCYTES # BLD AUTO: 11 % — LOW (ref 13–44)
MAGNESIUM SERPL-MCNC: 2.2 MG/DL — SIGNIFICANT CHANGE UP (ref 1.6–2.6)
MANUAL SMEAR VERIFICATION: SIGNIFICANT CHANGE UP
MCHC RBC-ENTMCNC: 30.9 PG — SIGNIFICANT CHANGE UP (ref 27–34)
MCHC RBC-ENTMCNC: 31.3 PG — SIGNIFICANT CHANGE UP (ref 27–34)
MCHC RBC-ENTMCNC: 32.2 G/DL — SIGNIFICANT CHANGE UP (ref 32–36)
MCHC RBC-ENTMCNC: 33.7 G/DL — SIGNIFICANT CHANGE UP (ref 32–36)
MCV RBC AUTO: 92.9 FL — SIGNIFICANT CHANGE UP (ref 80–100)
MCV RBC AUTO: 95.8 FL — SIGNIFICANT CHANGE UP (ref 80–100)
MONOCYTES # BLD AUTO: 0.23 K/UL — SIGNIFICANT CHANGE UP (ref 0–0.9)
MONOCYTES NFR BLD AUTO: 7.9 % — SIGNIFICANT CHANGE UP (ref 2–14)
NEUTROPHILS # BLD AUTO: 2.31 K/UL — SIGNIFICANT CHANGE UP (ref 1.8–7.4)
NEUTROPHILS NFR BLD AUTO: 79.1 % — HIGH (ref 43–77)
NRBC # BLD: 0 /100 WBCS — SIGNIFICANT CHANGE UP (ref 0–0)
NRBC # BLD: 0 /100 WBCS — SIGNIFICANT CHANGE UP (ref 0–0)
NRBC # FLD: 0 K/UL — SIGNIFICANT CHANGE UP (ref 0–0)
NRBC # FLD: 0 K/UL — SIGNIFICANT CHANGE UP (ref 0–0)
PHOSPHATE SERPL-MCNC: 2.8 MG/DL — SIGNIFICANT CHANGE UP (ref 2.5–4.5)
PLAT MORPH BLD: NORMAL — SIGNIFICANT CHANGE UP
PLATELET # BLD AUTO: 110 K/UL — LOW (ref 150–400)
PLATELET # BLD AUTO: 118 K/UL — LOW (ref 150–400)
PLATELET COUNT - ESTIMATE: ABNORMAL
POTASSIUM SERPL-MCNC: 3.8 MMOL/L — SIGNIFICANT CHANGE UP (ref 3.5–5.3)
POTASSIUM SERPL-MCNC: 4 MMOL/L — SIGNIFICANT CHANGE UP (ref 3.5–5.3)
POTASSIUM SERPL-SCNC: 3.8 MMOL/L — SIGNIFICANT CHANGE UP (ref 3.5–5.3)
POTASSIUM SERPL-SCNC: 4 MMOL/L — SIGNIFICANT CHANGE UP (ref 3.5–5.3)
PROT SERPL-MCNC: 6.2 G/DL — SIGNIFICANT CHANGE UP (ref 6–8.3)
PROT SERPL-MCNC: 6.3 G/DL — SIGNIFICANT CHANGE UP (ref 6–8.3)
PROTHROM AB SERPL-ACNC: 28.4 SEC — HIGH (ref 9.9–13.4)
PROTHROM AB SERPL-ACNC: 28.8 SEC — HIGH (ref 9.9–13.4)
RBC # BLD: 2.88 M/UL — LOW (ref 4.2–5.8)
RBC # BLD: 2.94 M/UL — LOW (ref 4.2–5.8)
RBC # FLD: 12.2 % — SIGNIFICANT CHANGE UP (ref 10.3–14.5)
RBC # FLD: 12.6 % — SIGNIFICANT CHANGE UP (ref 10.3–14.5)
RBC BLD AUTO: NORMAL — SIGNIFICANT CHANGE UP
RH IG SCN BLD-IMP: POSITIVE — SIGNIFICANT CHANGE UP
RSV RNA NPH QL NAA+NON-PROBE: SIGNIFICANT CHANGE UP
SARS-COV-2 RNA SPEC QL NAA+PROBE: SIGNIFICANT CHANGE UP
SODIUM SERPL-SCNC: 135 MMOL/L — SIGNIFICANT CHANGE UP (ref 135–145)
SODIUM SERPL-SCNC: 136 MMOL/L — SIGNIFICANT CHANGE UP (ref 135–145)
WBC # BLD: 2.49 K/UL — LOW (ref 3.8–10.5)
WBC # BLD: 2.92 K/UL — LOW (ref 3.8–10.5)
WBC # FLD AUTO: 2.49 K/UL — LOW (ref 3.8–10.5)
WBC # FLD AUTO: 2.92 K/UL — LOW (ref 3.8–10.5)

## 2024-12-31 PROCEDURE — 93306 TTE W/DOPPLER COMPLETE: CPT | Mod: 26

## 2024-12-31 PROCEDURE — 71045 X-RAY EXAM CHEST 1 VIEW: CPT | Mod: 26,XE

## 2024-12-31 PROCEDURE — 71046 X-RAY EXAM CHEST 2 VIEWS: CPT | Mod: 26

## 2024-12-31 PROCEDURE — 99222 1ST HOSP IP/OBS MODERATE 55: CPT | Mod: GC,25

## 2024-12-31 PROCEDURE — 43241 EGD TUBE/CATH INSERTION: CPT | Mod: GC

## 2024-12-31 PROCEDURE — 43247 EGD REMOVE FOREIGN BODY: CPT | Mod: GC

## 2024-12-31 PROCEDURE — 93010 ELECTROCARDIOGRAM REPORT: CPT

## 2024-12-31 PROCEDURE — 74177 CT ABD & PELVIS W/CONTRAST: CPT | Mod: 26

## 2024-12-31 PROCEDURE — 74018 RADEX ABDOMEN 1 VIEW: CPT | Mod: 26

## 2024-12-31 PROCEDURE — 99285 EMERGENCY DEPT VISIT HI MDM: CPT

## 2024-12-31 PROCEDURE — 43248 EGD GUIDE WIRE INSERTION: CPT | Mod: GC

## 2024-12-31 PROCEDURE — 99223 1ST HOSP IP/OBS HIGH 75: CPT

## 2024-12-31 PROCEDURE — 74360 X-RAY GUIDE GI DILATION: CPT | Mod: 26,GC

## 2024-12-31 DEVICE — BLLN EXTRCTR PRO RX-S 15-18MM: Type: IMPLANTABLE DEVICE | Status: FUNCTIONAL

## 2024-12-31 DEVICE — DRNGE ST BIL NSL 7FR 250CM: Type: IMPLANTABLE DEVICE | Status: FUNCTIONAL

## 2024-12-31 RX ORDER — DIGOXIN 250 MCG
125 TABLET ORAL DAILY
Refills: 0 | Status: DISCONTINUED | OUTPATIENT
Start: 2024-12-31 | End: 2025-01-01

## 2024-12-31 RX ORDER — DEXTROSE MONOHYDRATE 25 G/50ML
15 INJECTION, SOLUTION INTRAVENOUS ONCE
Refills: 0 | Status: DISCONTINUED | OUTPATIENT
Start: 2024-12-31 | End: 2025-01-03

## 2024-12-31 RX ORDER — PHYTONADIONE 1 MG/.5ML
5 INJECTION, EMULSION INTRAMUSCULAR; INTRAVENOUS; SUBCUTANEOUS DAILY
Refills: 0 | Status: DISCONTINUED | OUTPATIENT
Start: 2024-12-31 | End: 2024-12-31

## 2024-12-31 RX ORDER — PHYTONADIONE 1 MG/.5ML
5 INJECTION, EMULSION INTRAMUSCULAR; INTRAVENOUS; SUBCUTANEOUS ONCE
Refills: 0 | Status: COMPLETED | OUTPATIENT
Start: 2024-12-31 | End: 2024-12-31

## 2024-12-31 RX ORDER — GLUCAGON INJECTION, SOLUTION 0.5 MG/.1ML
1 INJECTION, SOLUTION SUBCUTANEOUS ONCE
Refills: 0 | Status: DISCONTINUED | OUTPATIENT
Start: 2024-12-31 | End: 2025-01-03

## 2024-12-31 RX ORDER — ENOXAPARIN SODIUM 60 MG/.6ML
40 INJECTION INTRAVENOUS; SUBCUTANEOUS EVERY 24 HOURS
Refills: 0 | Status: DISCONTINUED | OUTPATIENT
Start: 2024-12-31 | End: 2024-12-31

## 2024-12-31 RX ORDER — DEXTROSE MONOHYDRATE 25 G/50ML
25 INJECTION, SOLUTION INTRAVENOUS ONCE
Refills: 0 | Status: DISCONTINUED | OUTPATIENT
Start: 2024-12-31 | End: 2025-01-03

## 2024-12-31 RX ORDER — SODIUM CHLORIDE 9 MG/ML
1000 INJECTION, SOLUTION INTRAVENOUS
Refills: 0 | Status: DISCONTINUED | OUTPATIENT
Start: 2024-12-31 | End: 2025-01-03

## 2024-12-31 RX ORDER — PIPERACILLIN AND TAZOBACTAM 3; .375 G/15ML; G/15ML
3.38 INJECTION, POWDER, LYOPHILIZED, FOR SOLUTION INTRAVENOUS ONCE
Refills: 0 | Status: DISCONTINUED | OUTPATIENT
Start: 2024-12-31 | End: 2024-12-31

## 2024-12-31 RX ORDER — INSULIN LISPRO 100/ML
VIAL (ML) SUBCUTANEOUS EVERY 6 HOURS
Refills: 0 | Status: DISCONTINUED | OUTPATIENT
Start: 2024-12-31 | End: 2025-01-03

## 2024-12-31 RX ORDER — MORPHINE SULFATE 15 MG
4 TABLET, EXTENDED RELEASE ORAL ONCE
Refills: 0 | Status: DISCONTINUED | OUTPATIENT
Start: 2024-12-31 | End: 2024-12-31

## 2024-12-31 RX ORDER — METOPROLOL TARTRATE 50 MG
5 TABLET ORAL EVERY 6 HOURS
Refills: 0 | Status: DISCONTINUED | OUTPATIENT
Start: 2024-12-31 | End: 2025-01-03

## 2024-12-31 RX ORDER — SODIUM CHLORIDE 9 MG/ML
1000 INJECTION, SOLUTION INTRAVENOUS
Refills: 0 | Status: DISCONTINUED | OUTPATIENT
Start: 2024-12-31 | End: 2024-12-31

## 2024-12-31 RX ORDER — DEXTROSE MONOHYDRATE 25 G/50ML
12.5 INJECTION, SOLUTION INTRAVENOUS ONCE
Refills: 0 | Status: DISCONTINUED | OUTPATIENT
Start: 2024-12-31 | End: 2025-01-03

## 2024-12-31 RX ORDER — SODIUM CHLORIDE 9 MG/ML
1000 INJECTION, SOLUTION INTRAVENOUS
Refills: 0 | Status: DISCONTINUED | OUTPATIENT
Start: 2024-12-31 | End: 2025-01-02

## 2024-12-31 RX ADMIN — Medication 4 MILLIGRAM(S): at 12:23

## 2024-12-31 RX ADMIN — Medication 5 MILLIGRAM(S): at 18:58

## 2024-12-31 RX ADMIN — PHYTONADIONE 101 MILLIGRAM(S): 1 INJECTION, EMULSION INTRAMUSCULAR; INTRAVENOUS; SUBCUTANEOUS at 21:35

## 2024-12-31 RX ADMIN — Medication 4 MILLIGRAM(S): at 10:20

## 2024-12-31 RX ADMIN — SODIUM CHLORIDE 100 MILLILITER(S): 9 INJECTION, SOLUTION INTRAVENOUS at 12:27

## 2024-12-31 NOTE — ED ADULT TRIAGE NOTE - CHIEF COMPLAINT QUOTE
Pt c/o abdominal pain with nausea/vomiting X 1 week, worsening today. Denies chest pain, sob, diarrhea, fevers/chills.  Hx: HTN, CAD, Pancreatic CA(last radiation september) Pt c/o abdominal pain with nausea/vomiting X 1 week, worsening today. Denies chest pain, sob, diarrhea, fevers/chills. Hx: HTN, CAD, Pancreatic CA(last radiation september)  Daughter(Kimberli): [phone]

## 2024-12-31 NOTE — H&P ADULT - ATTENDING COMMENTS
Locally advanced unresectable pancreatic body adenocarcinoma.  Now presents with duodenal obstruction and early signs of biliary obstruction.  Advanced GI evaluation for EGD/stent placement.  Higher risk for surgical bypass due to ascites and history of palliative radiation.

## 2024-12-31 NOTE — CONSULT NOTE ADULT - ASSESSMENT
68 y/o M with PMHx pancreatic adenocarcinoma s/p radiation and palliative chemotherapy, DM, afib ob Eliquis, CAD (on DAPT) and HLD presenting for a second opinion with Dr. Toro Alonso. Pt was recently admitted and discharged on NYP-Q  for gastric outlet obstruction.       #pancreatic adenocarcinoma  #GOO  pt presented with abdominal pain after recent admission to OSH found to have GOO on imaging and at that hospital not found to be a candidate for surgical / endoscopic intervention. Pt with persistent nausea / vomiting at home prompted ED visit for second opinion with Dr. Alonso. PT found to have New intrahepatic biliary duct dilatation suggesting extrahepatic biliary obstruction by tumor and Marked gastric distention may reflect gastric outlet obstruction by tumor. Increasing ascites noted. PT would benefit from EGD with duodenal stent +/- ERCP. Due to ascites, pt not likely a candidate for endoscopic gastrojejunoscopy. Can tentatively plan for EGD with duodenal stent +/- ERCP later this week.     Recommendations:  - tentative plan for EGD with duodenal stent +/- ERCP 1/2/25  - early AM labs 1/2/25 pre procedure   - NPO at this time  - cardiac clearance for risk stratification pre procedure  - CBC / CMP daily  - rest of care per primary team     Note incomplete until finalized by attending signature/attestation.    Nilam Park, Advanced GI NP  available on [employer] TEAMS (M/T/Th/F 7am-4pm)    NON-URGENT CONSULTS:  Please email [email or web address]    After 5pm, please contact the oncall GI fellow for any urgent issues via the hospital     68 y/o M with PMHx pancreatic adenocarcinoma s/p radiation and palliative chemotherapy, DM, afib ob Eliquis, CAD (on DAPT) and HLD presenting for a second opinion with Dr. Toro Alonso. Pt was recently admitted and discharged on NYP-Q  for gastric outlet obstruction.       #pancreatic adenocarcinoma  #GOO  pt presented with abdominal pain after recent admission to OSH found to have GOO on imaging and at that hospital not found to be a candidate for surgical / endoscopic intervention. Pt with persistent nausea / vomiting at home prompted ED visit for second opinion with Dr. Alonso. PT found to have New intrahepatic biliary duct dilatation suggesting extrahepatic biliary obstruction by tumor and Marked gastric distention may reflect gastric outlet obstruction by tumor. Increasing ascites noted. PT would benefit from EGD with duodenal stent +/- ERCP. Due to ascites, pt not likely a candidate for endoscopic gastrojejunoscopy. Can tentatively plan for EGD with duodenal stent +/- ERCP later this week.     Recommendations:  - EGD with NGT placement today   - tentative plan for EGD with duodenal stent +/- ERCP 1/2/25  - early AM labs 1/2/25 pre procedure   - NPO at this time  - cardiac clearance for risk stratification pre procedure  - CBC / CMP daily  - rest of care per primary team     Note incomplete until finalized by attending signature/attestation.    Nilam Park, Advanced GI NP  available on [employer] TEAMS (M/T/Th/F 7am-4pm)    NON-URGENT CONSULTS:  Please email [email or web address]    After 5pm, please contact the oncall GI fellow for any urgent issues via the hospital     70 y/o M with PMHx pancreatic adenocarcinoma s/p radiation and palliative chemotherapy, DM, afib ob Eliquis, CAD (on DAPT) and HLD presenting for a second opinion with Dr. Toro Alonso. Pt was recently admitted and discharged on NYP-Q  for gastric outlet obstruction.       #pancreatic adenocarcinoma  #GOO  pt presented with abdominal pain after recent admission to OSH found to have GOO on imaging and at that hospital not found to be a candidate for surgical / endoscopic intervention. Pt with persistent nausea / vomiting at home prompted ED visit for second opinion with Dr. Alonso. PT found to have New intrahepatic biliary duct dilatation suggesting extrahepatic biliary obstruction by tumor and Marked gastric distention may reflect gastric outlet obstruction by tumor. Increasing ascites noted. PT would benefit from EGD with duodenal stent +/- ERCP. Due to ascites, pt not likely a candidate for endoscopic gastrojejunoscopy. Can tentatively plan for EGD with duodenal stent +/- ERCP later this week.     Recommendations:  - EGD with NG tube to suction and NJ tube for feeds placement today   - tentative plan for EGD with duodenal stent +/- ERCP 1/2/25 when off anticoagulation and INR < 1.8  - early AM labs 1/2/25 pre procedure   - NPO at this time  - cardiac clearance for risk stratification pre procedure  - CBC / CMP daily  - rest of care per primary team     Note incomplete until finalized by attending signature/attestation.    Nilam Park Advanced GI NP  available on [employer] TEAMS (M/T/Th/F 7am-4pm)    NON-URGENT CONSULTS:  Please email [email or web address]    After 5pm, please contact the oncall GI fellow for any urgent issues via the hospital

## 2024-12-31 NOTE — CHART NOTE - NSCHARTNOTEFT_GEN_A_CORE
POST-PROCEDURE CHECK    Subjective:  Patient is s/p EGD, NG and NJ placement by GI Team. Recovering appropriately. Denies pain, denies nausea, denies vomiting, denies CP, denies SOB    Vital Signs Last 24 Hrs  T(C): 36.9 (31 Dec 2024 17:05), Max: 37.2 (31 Dec 2024 15:53)  T(F): 98.4 (31 Dec 2024 17:05), Max: 98.9 (31 Dec 2024 15:53)  HR: 64 (31 Dec 2024 18:05) (58 - 97)  BP: 137/78 (31 Dec 2024 18:05) (113/70 - 142/90)  BP(mean): --  RR: 12 (31 Dec 2024 18:05) (12 - 18)  SpO2: 98% (31 Dec 2024 18:05) (97% - 100%)    Parameters below as of 31 Dec 2024 18:05  Patient On (Oxygen Delivery Method): room air      I&O's Detail    digoxin  Injectable 125  metoprolol tartrate Injectable 5    PAST MEDICAL & SURGICAL HISTORY:  Hypertension  CAD (coronary artery disease)  Stented coronary artery  x 1 stent  Stented coronary artery        Physical Exam:  General: NAD, resting comfortably in bed, NG/NJ tube bridled   Pulmonary: Nonlabored breathing, no respiratory distress  Cardiovascular: NSR  Abdominal: soft, NT/ND  Extremities: WWP      LABS:                        8.9    2.49  )-----------( 110      ( 31 Dec 2024 12:19 )             27.6     12-31    135  |  99  |  15  ----------------------------<  108[H]  3.8   |  25  |  0.67    Ca    8.7      31 Dec 2024 12:19  Phos  2.8     12-31  Mg     2.20     12-31    TPro  6.2  /  Alb  3.4  /  TBili  0.6  /  DBili  x   /  AST  42[H]  /  ALT  33  /  AlkPhos  526[H]  12-31    PT/INR - ( 31 Dec 2024 12:19 )   PT: 28.8 sec;   INR: 2.44 ratio         PTT - ( 31 Dec 2024 12:19 )  PTT:32.5 sec  CAPILLARY BLOOD GLUCOSE      POCT Blood Glucose.: 108 mg/dL (31 Dec 2024 18:57)      Radiology and Additional Studies:    Assessment:  This is a 68 y/o M with PMHx pancreatic adenocarcinoma s/p radiation and palliative chemotherapy, DM, afib ob Eliquis, CAD (on DAPT) and HLD presenting for a second opinion with Dr. Toro Alonso. Pt was recently admitted and discharged on NYP-Q  for gastric outlet obstruction, mild pyloric extrinsic stenosis, and impaired gastric motility. CT scan at the outside hospital demonstrated "7.7 cm ill-defined pancreatic mass encases in size since the prior exam and now causing narrowing of the fist portion of duodenum and gastric outlet obstruction. Intra and extrahepatic biliary ductal dilation likely due to obstruction. Thrombosis of the splenic vein. lack of visualization of portal confluence may represent thrombosis versus compression. Moderate ascites increased since the prior exam. ". Pt is now s/p EGD, and NG and NJ placement with GI and recovering appropriately      Plan:  - NGT to LCWS/NPO/IVF  - GI c/s appreciated- plan for possible duodenal stent placement +/- ERCP with biliary stenting this admission, likely Thursday   - Trend LFTs, Coags.  - DVT ppx held  - low dose ISS  - IV metop 5 q6h  - OOB and ambulating as tolerated  - F/u AM labs    E Team Surgery   [id number]

## 2024-12-31 NOTE — ED ADULT NURSE NOTE - CHIEF COMPLAINT QUOTE
Pt c/o abdominal pain with nausea/vomiting X 1 week, worsening today. Denies chest pain, sob, diarrhea, fevers/chills. Hx: HTN, CAD, Pancreatic CA(last radiation september)  Daughter(Kimberli): [phone]

## 2024-12-31 NOTE — ED PROVIDER NOTE - CLINICAL SUMMARY MEDICAL DECISION MAKING FREE TEXT BOX
Likely TBA for second opinion of gastric outlet obstruction 2/2 advancing pancreatic adenocarcinoma with Dr. Alonso. MD Elena: 68 yo M with h/o pancreatic adenocarcinoma s/p radiation and palliative chemotherapy, DM, afib ob Eliquis, CAD (on DAPT) and HLD who was brought to the ED by family for vomiting. The pt was just admitted at [facility] for similar symptoms, discharged yesterday after being found to have gastric outlet obstruction secondary to tumor. Family was told that he was not a candidate for any intervention. Family called Dr Alonso, surgical oncology after pt continued vomiting at home and was recommended to come to the ED at [facility] today for evaluation and second opinion. No abdominal pain, constipation, diarrhea, fever, chills, chest pain, SOB. Plan for pre-op labs, and will consult surgery

## 2024-12-31 NOTE — ED ADULT NURSE REASSESSMENT NOTE - NS ED NURSE REASSESS COMMENT FT1
Patient is A+Ox4, patient is resting comfortably. Respirations are even and unlabored. No requests at this time. Offering no further medical complaints. Reporting improvement of symptoms since time of arrival to the ED. Comfort and safety measures maintained. Awaiting bed admit. Plan of care ongoing.
Patient placed on 2L NC for comfort measures. Respirations are even and unlabored. Patient resting comfortably. Safety precautions maintained. Patient denies any requests at this time. Plan of care ongoing.
Patient reports 8/10 LLQ abdominal pain x 2 days that does not radiate. MD Elias made aware and ordered 4mg Morpine IVP. Med administered. Vitals of /89 mmHg and respirations taken before administration and MD Elias made aware. Respirations are even and unlabored. Abdomen soft,, nondistended and  to palpation in LLQ Patient resting comfortably and tolerated procedure well. Will reassess pain. Safety maintained.
Patient now reporting 2/10 LLQ abdominal pain after 4mg of Morphine IVP. Patient resting comfortably. Patient denies nausea, vomiting diarrhea, shortness of breath, chest pain, fever or chills.

## 2024-12-31 NOTE — H&P ADULT - PROBLEM SELECTOR PROBLEM 1
decreased ability to use arms for pushing/pulling/decreased ability to use legs for bridging/pushing Gastric outlet obstruction

## 2024-12-31 NOTE — CONSULT NOTE ADULT - ATTENDING COMMENTS
Agree with above.    I saw and examined the patient on 12-31-24. I agree with the findings and plan of care as documented in the fellow/resident/medical student/physician assistant/nurse practitioner.    Today we are treating the patient for:   Gastric Outlet Obstruction  pancreatic adenocarcinoma    ***General note with plan / recommendation:   Presenting with Gastric Outlet Obstruction, LFTs normal / not obstructed. Will plan on EGD with NJ placement for feeds and NG for suction / decompression today.  When patient off anticoagulation will do ERCP for biliary stenting and duodenal stenting. Endoscopic GJ is not ideal given the moderate ascities.   Rest of plan of care as per EGD today.      Billing:  I have reviewed records from labs, CT.     Other:  - Thank you for involving us in the care of this patient. If you have any questions regarding the plan of care please do not hesitate to call us back.    Contact:     Daytime        Available on [employer] Teams        [phone] ([facility] Short Range Pager)        [phone] ([facility] Long Range Pager)     On Weekends/Holidays (All day) and Weekdays after 5 PM to 8AM:        For non-urgent consults, please email [email or web address]        For emergent consults, please contact on call GI team.  See Amion schedule ([facility]), [employer] paging system ([facility]), or call hospital  ([facility])

## 2024-12-31 NOTE — ED PROVIDER NOTE - PHYSICAL EXAMINATION
GENERAL: no acute distress, non-toxic appearing, cachetic man   HEENT: normal conjunctiva, oral mucosa moist  CARDIAC: regular rate and regular rhythm  PULM: clear to ascultation bilaterally, no appreciable crackles, rales, rhonchi, or wheezing, no increased work of breathing  GI: abdomen nondistended, soft, minimally tender epigastrically

## 2024-12-31 NOTE — ED PROVIDER NOTE - ATTENDING CONTRIBUTION TO CARE
68 yo M with h/o pancreatic adenocarcinoma s/p radiation and palliative chemotherapy, DM, afib ob Eliquis, CAD (on DAPT) and HLD who was brought to the ED by family for vomiting. The pt was just admitted at [facility] for similar symptoms, discharged yesterday after being found to have gastric outlet obstruction secondary to tumor. Family was told that he was not a candidate for any intervention. Family called Dr Alonso, surgical oncology after pt continued vomiting at home and was recommended to come to the ED at [facility] today for evaluation and second opinion. No abdominal pain, constipation, diarrhea, fever, chills, chest pain, SOB. Plan for pre-op labs, and will consult surgery

## 2024-12-31 NOTE — ED ADULT NURSE NOTE - TEMPERATURE IN FAHRENHEIT (DEGREES F)
[FreeTextEntry1] : reviewed past records\par 12/31/19: Lab tests w/nl. GUILLERMO 13-13y6m @ 12y11m, normal. 98.1

## 2024-12-31 NOTE — ED PROVIDER NOTE - PROGRESS NOTE DETAILS
Surgery consulted, aware of patient. Recommending abdominal x-ray. If large gastric bubble recommending NGT placement prior to CT abd/pelvis with IV contrast. Pt's pain well controlled at this time with methadone PTA.  Maisha Elias PGY1 Ann Lora MD, PGY3:  NGT placement by Surgical PA unsuccessful, surg onc fellow to come down and place NGT. surgery team state pt does not need to wait for NGT placement for CT. pt to be admitted to Dr Alonso.     spoke with Daughter Kimberli, updated her on findings and she consents to admission.

## 2024-12-31 NOTE — H&P ADULT - PROBLEM SELECTOR PLAN 1
Admit to E team surgery   -NPO/NGT   -IVF  -CT scan with IV contrast     D/W surgical  oncology fellow

## 2024-12-31 NOTE — ED PROVIDER NOTE - OBJECTIVE STATEMENT
68 y/o M with PMHx pancreatic adenocarcinoma s/p radiation and palliative chemotherapy, DM, afib ob Eliquis, CAD (on DAPT) and HLD presenting for a second opinion with Dr. Toro Alonso. Pt was recently admitted and discharged on NYP-Q  for gastric outlet obstruction, mild pyloric extrinsic stenosis, and impaired gastric motility. CT scan at the outside hospital demonstrated "7.7 cm ill-defined pancreatic mass encases in size since the prior exam and now causing narrowing of the fist portion of duodenum and gastric outlet obstruction. Intra and extrahepatic biliary ductal dilation likely due to obstruction. Thrombosis of the splenic vein. lack of visualization of portal confluence may represent thrombosis versus compression. Moderate ascites increased since the prior exam. Mild circumferential bladder wall thickening. Circumferential bladder wall thickening. Correlation with urinalysis to rule out cystitis is recommended.     Recommendations from [facility] was for no surgical intervention or PEG at this time. Symptomatic management and palliative care.     Pt dc yesterday, continued to vomit post discharge. Pt states last bowel movement was yesterday, pt passed gas today. Pt denies fevers, cough, congestion, SOB, CP, back pain, dysuria. Pt endorsing minimal abdominal pain.

## 2024-12-31 NOTE — H&P ADULT - HISTORY OF PRESENT ILLNESS
70 y/o M with PMHx pancreatic adenocarcinoma s/p radiation and palliative chemotherapy, DM, afib ob Eliquis, CAD (on DAPT) and HLD presenting for a second opinion with Dr. Toro Alonso. Pt was recently admitted and discharged on NYP-Q  for gastric outlet obstruction, mild pyloric extrinsic stenosis, and impaired gastric motility. CT scan at the outside hospital demonstrated "7.7 cm ill-defined pancreatic mass encases in size since the prior exam and now causing narrowing of the fist portion of duodenum and gastric outlet obstruction. Intra and extrahepatic biliary ductal dilation likely due to obstruction. Thrombosis of the splenic vein. lack of visualization of portal confluence may represent thrombosis versus compression. Moderate ascites increased since the prior exam.

## 2024-12-31 NOTE — ED ADULT NURSE NOTE - OBJECTIVE STATEMENT
Pt is a 68 y/o male received in room 14 complaining of LLQ abdominal pain and nausea and vomiting for the past 2 days. Patient is A+Ox4 and ambulatory to baseline. Patient does speak Mandarin and  was used to receive information (ID [id number]). Patient has a past medical history of CAD, HTN and prostate cancer. Patient is accompanied by family at the bedside. Patient endorses that he has 8/10 LLQ abdominal pain for the past 2 days accompanied with nausea and vomiting. Patient endorses that he has had about 4 episodes of vomiting over the past 2 days. Patient endorses that he did take pain medication last night but does not remember the dosage or name. Patient endorses that he did not eat anything new and that his last meal was 6am this morning and that he ate chinese rice. Patient endorses that his last bowel movement was yesterday afternoon and that it was normal. Patient denies sick contacts or any recent international travel. Patient denies chest pain, urinary symptoms, shortness of breath, diarrhea, urinary symptoms, fever or chills.     Upon assessment, neuro is intact, patient is speaking in full sentences and face is symmetrical. Respirations are even and unlabored. Cardiac monitor is initiated and sinus bradycardia is noted. MD made aware. Abdomen is soft and nondistended. LLQ tender to palpation. Skin is warm, dry, pink and intact. 20G IV placed in left forearm, labs sent. Pending chest x-ray and x-ray of abdomen. Safety precautions implemented and maintained as per protocol, awaiting further orders. Plan of care ongoing. Pt is a 68 y/o male received in room 14 complaining of LLQ abdominal pain and nausea and vomiting for the past 2 days. Patient is A+Ox4 and ambulatory to baseline. Patient does speak Mandarin and  was used to receive information (ID [id number]). Patient has a past medical history of CAD, HTN and prostate cancer. Patient is accompanied by family at the bedside. Patient endorses that he has 8/10 LLQ abdominal pain for the past 2 days accompanied with nausea and vomiting. Patient endorses that he has had about 4 episodes of vomiting over the past 2 days. Patient endorses that he did take pain medication last night but does not remember the dosage or name. Patient endorses that he did not eat anything new and that his last meal was 6am this morning and that he ate chinese rice. Patient endorses that his last bowel movement was yesterday afternoon and that it was normal. Patient denies sick contacts or any recent international travel. Patient denies chest pain, urinary symptoms, shortness of breath, diarrhea, urinary symptoms, fever or chills.     Upon assessment, neuro is intact, patient is speaking in full sentences and face is symmetrical. Respirations are even and unlabored. Cardiac monitor is initiated and sinus bradycardia is noted. MD made aware. Abdomen is soft and nondistended. LLQ tender to palpation. Skin is warm, dry, pink and intact. Patient has a right chest port, not accessed. Patient endorses that last chemotherapy session was March. 20G IV placed in left forearm, labs sent. Pending chest x-ray and x-ray of abdomen. Safety precautions implemented and maintained as per protocol, awaiting further orders. Plan of care ongoing. Pt is a 70 y/o male received in room 14 complaining of LLQ abdominal pain and nausea and vomiting for the past 2 days. Patient is A+Ox4 and ambulatory to baseline. Patient does speak Mandarin and  was used to receive information (ID [id number]). Patient has a past medical history of CAD, HTN and prostate cancer. Patient is accompanied by family at the bedside. Patient endorses that he has 8/10 LLQ abdominal pain for the past 2 days accompanied with nausea and vomiting. Patient endorses that he has had about 4 episodes of vomiting over the past 2 days. Patient endorses that he did take pain medication last night but does not remember the dosage or name. Patient endorses that he did not eat anything new and that his last meal was 6am this morning and that he ate chinese rice. Patient endorses that his last bowel movement was yesterday afternoon and that it was normal. Patient denies sick contacts or any recent international travel. Patient denies chest pain, urinary symptoms, shortness of breath, diarrhea, urinary symptoms, fever or chills.     Upon assessment, neuro is intact, patient is speaking in full sentences and face is symmetrical. Respirations are even and unlabored. Cardiac monitor is initiated and sinus bradycardia is noted. MD made aware. Abdomen is soft and nondistended. LLQ tender to palpation. Skin is warm, dry, pink and intact. Patient has a right chest port, not accessed. Patient endorses that his last chemotherapy session was March. 20G IV placed in left forearm, labs sent. Pending chest x-ray and x-ray of abdomen. Safety precautions implemented and maintained as per protocol, awaiting further orders. Plan of care ongoing. Pt is a 68 y/o male received in room 14 complaining of LLQ abdominal pain and nausea and vomiting for the past 2 days. Patient is A+Ox4 and ambulatory to baseline. Patient does speak Mandarin and  was used to receive information (ID [id number]). Patient has a past medical history of CAD, HTN and prostate cancer. Patient is accompanied by family at the bedside. Patient endorses that he has 8/10 LLQ abdominal pain for the past 2 days accompanied with nausea and vomiting. Patient endorses that he has had about 4 episodes of vomiting over the past 2 days. Patient endorses that he did take pain medication last night but does not remember the dosage or name. Patient endorses that he did not eat anything new and that his last meal was 6am this morning and that he ate chinese rice. Patient endorses no past abdominal surgeries. Patient endorses that his last bowel movement was yesterday afternoon and that it was normal. Patient denies sick contacts or any recent international travel. Patient denies chest pain, urinary symptoms, shortness of breath, diarrhea, urinary symptoms, fever or chills.     Upon assessment, neuro is intact, patient is speaking in full sentences and face is symmetrical. Respirations are even and unlabored. Cardiac monitor is initiated and sinus bradycardia is noted. MD made aware. Abdomen is soft and nondistended. LLQ tender to palpation. Skin is warm, dry, pink and intact. Patient has a right chest port, not accessed. Patient endorses that his last chemotherapy session was March. 20G IV placed in left forearm, labs sent. Pending chest x-ray and x-ray of abdomen. Safety precautions implemented and maintained as per protocol, awaiting further orders. Plan of care ongoing. Pt is a 68 y/o male received in room 14 complaining of LLQ abdominal pain and nausea and vomiting for the past 2 days. Patient is A+Ox4 and ambulatory to baseline. Patient does speak Mandarin and  was used to receive information (ID [id number]). Patient has a past medical history of CAD, HTN and pancreatic cancer. Patient is accompanied by family at the bedside. Patient endorses that he has 8/10 LLQ abdominal pain for the past 2 days accompanied with nausea and vomiting. Patient endorses that he has had about 4 episodes of vomiting over the past 2 days. Patient endorses that he did take pain medication last night but does not remember the dosage or name. Patient endorses that he did not eat anything new and that his last meal was 6am this morning and that he ate chinese rice. Patient endorses no past abdominal surgeries. Patient endorses that his last bowel movement was yesterday afternoon and that it was normal. Patient denies sick contacts or any recent international travel. Patient denies chest pain, urinary symptoms, shortness of breath, diarrhea, urinary symptoms, fever or chills.     Upon assessment, neuro is intact, patient is speaking in full sentences and face is symmetrical. Respirations are even and unlabored. Cardiac monitor is initiated and sinus bradycardia is noted. MD made aware. Abdomen is soft and nondistended. LLQ tender to palpation. Skin is warm, dry, pink and intact. Patient has a right chest port, not accessed. Patient endorses that his last chemotherapy session was March. 20G IV placed in left forearm, labs sent. Pending chest x-ray and x-ray of abdomen. Safety precautions implemented and maintained as per protocol, awaiting further orders. Plan of care ongoing.

## 2025-01-01 LAB
ALBUMIN SERPL ELPH-MCNC: 3.4 G/DL — SIGNIFICANT CHANGE UP (ref 3.3–5)
ALP SERPL-CCNC: 510 U/L — HIGH (ref 40–120)
ALT FLD-CCNC: 29 U/L — SIGNIFICANT CHANGE UP (ref 4–41)
ANION GAP SERPL CALC-SCNC: 15 MMOL/L — HIGH (ref 7–14)
APTT BLD: 33.6 SEC — SIGNIFICANT CHANGE UP (ref 24.5–35.6)
AST SERPL-CCNC: 36 U/L — SIGNIFICANT CHANGE UP (ref 4–40)
BILIRUB SERPL-MCNC: 0.8 MG/DL — SIGNIFICANT CHANGE UP (ref 0.2–1.2)
BUN SERPL-MCNC: 14 MG/DL — SIGNIFICANT CHANGE UP (ref 7–23)
CALCIUM SERPL-MCNC: 9.1 MG/DL — SIGNIFICANT CHANGE UP (ref 8.4–10.5)
CHLORIDE SERPL-SCNC: 99 MMOL/L — SIGNIFICANT CHANGE UP (ref 98–107)
CO2 SERPL-SCNC: 21 MMOL/L — LOW (ref 22–31)
CREAT SERPL-MCNC: 0.69 MG/DL — SIGNIFICANT CHANGE UP (ref 0.5–1.3)
DIGOXIN SERPL-MCNC: 0.3 NG/ML — LOW (ref 0.8–2)
EGFR: 99 ML/MIN/1.73M2 — SIGNIFICANT CHANGE UP
GLUCOSE BLDC GLUCOMTR-MCNC: 105 MG/DL — HIGH (ref 70–99)
GLUCOSE BLDC GLUCOMTR-MCNC: 108 MG/DL — HIGH (ref 70–99)
GLUCOSE BLDC GLUCOMTR-MCNC: 124 MG/DL — HIGH (ref 70–99)
GLUCOSE BLDC GLUCOMTR-MCNC: 92 MG/DL — SIGNIFICANT CHANGE UP (ref 70–99)
GLUCOSE BLDC GLUCOMTR-MCNC: 94 MG/DL — SIGNIFICANT CHANGE UP (ref 70–99)
GLUCOSE SERPL-MCNC: 112 MG/DL — HIGH (ref 70–99)
HCT VFR BLD CALC: 28.3 % — LOW (ref 39–50)
HGB BLD-MCNC: 9.6 G/DL — LOW (ref 13–17)
INR BLD: 1.7 RATIO — HIGH (ref 0.85–1.16)
MAGNESIUM SERPL-MCNC: 2.2 MG/DL — SIGNIFICANT CHANGE UP (ref 1.6–2.6)
MCHC RBC-ENTMCNC: 31.3 PG — SIGNIFICANT CHANGE UP (ref 27–34)
MCHC RBC-ENTMCNC: 33.9 G/DL — SIGNIFICANT CHANGE UP (ref 32–36)
MCV RBC AUTO: 92.2 FL — SIGNIFICANT CHANGE UP (ref 80–100)
NRBC # BLD: 0 /100 WBCS — SIGNIFICANT CHANGE UP (ref 0–0)
NRBC # FLD: 0 K/UL — SIGNIFICANT CHANGE UP (ref 0–0)
PHOSPHATE SERPL-MCNC: 3.7 MG/DL — SIGNIFICANT CHANGE UP (ref 2.5–4.5)
PLATELET # BLD AUTO: 124 K/UL — LOW (ref 150–400)
POTASSIUM SERPL-MCNC: 4.5 MMOL/L — SIGNIFICANT CHANGE UP (ref 3.5–5.3)
POTASSIUM SERPL-SCNC: 4.5 MMOL/L — SIGNIFICANT CHANGE UP (ref 3.5–5.3)
PROT SERPL-MCNC: 6.4 G/DL — SIGNIFICANT CHANGE UP (ref 6–8.3)
PROTHROM AB SERPL-ACNC: 19.6 SEC — HIGH (ref 9.9–13.4)
RBC # BLD: 3.07 M/UL — LOW (ref 4.2–5.8)
RBC # FLD: 12.1 % — SIGNIFICANT CHANGE UP (ref 10.3–14.5)
SODIUM SERPL-SCNC: 135 MMOL/L — SIGNIFICANT CHANGE UP (ref 135–145)
WBC # BLD: 3.33 K/UL — LOW (ref 3.8–10.5)
WBC # FLD AUTO: 3.33 K/UL — LOW (ref 3.8–10.5)

## 2025-01-01 RX ORDER — PHYTONADIONE 1 MG/.5ML
5 INJECTION, EMULSION INTRAMUSCULAR; INTRAVENOUS; SUBCUTANEOUS ONCE
Refills: 0 | Status: COMPLETED | OUTPATIENT
Start: 2025-01-01 | End: 2025-01-01

## 2025-01-01 RX ORDER — ACETAMINOPHEN 80 MG/.8ML
1000 SOLUTION/ DROPS ORAL EVERY 6 HOURS
Refills: 0 | Status: DISCONTINUED | OUTPATIENT
Start: 2025-01-01 | End: 2025-01-03

## 2025-01-01 RX ORDER — DIGOXIN 250 MCG
125 TABLET ORAL DAILY
Refills: 0 | Status: DISCONTINUED | OUTPATIENT
Start: 2025-01-01 | End: 2025-01-03

## 2025-01-01 RX ORDER — INFLUENZA A VIRUS A/WISCONSIN/588/2019 (H1N1) RECOMBINANT HEMAGGLUTININ ANTIGEN, INFLUENZA A VIRUS A/DARWIN/6/2021 (H3N2) RECOMBINANT HEMAGGLUTININ ANTIGEN, INFLUENZA B VIRUS B/AUSTRIA/1359417/2021 RECOMBINANT HEMAGGLUTININ ANTIGEN, AND INFLUENZA B VIRUS B/PHUKET/3073/2013 RECOMBINANT HEMAGGLUTININ ANTIGEN 45; 45; 45; 45 UG/.5ML; UG/.5ML; UG/.5ML; UG/.5ML
0.5 INJECTION INTRAMUSCULAR ONCE
Refills: 0 | Status: DISCONTINUED | OUTPATIENT
Start: 2025-01-01 | End: 2025-01-03

## 2025-01-01 RX ORDER — ACETAMINOPHEN 80 MG/.8ML
1000 SOLUTION/ DROPS ORAL EVERY 6 HOURS
Refills: 0 | Status: DISCONTINUED | OUTPATIENT
Start: 2025-01-01 | End: 2025-01-01

## 2025-01-01 RX ADMIN — Medication 5 MILLIGRAM(S): at 01:42

## 2025-01-01 RX ADMIN — Medication 5 MILLIGRAM(S): at 06:24

## 2025-01-01 RX ADMIN — PHYTONADIONE 101 MILLIGRAM(S): 1 INJECTION, EMULSION INTRAMUSCULAR; INTRAVENOUS; SUBCUTANEOUS at 08:36

## 2025-01-01 RX ADMIN — SODIUM CHLORIDE 90 MILLILITER(S): 9 INJECTION, SOLUTION INTRAVENOUS at 03:51

## 2025-01-01 RX ADMIN — SODIUM CHLORIDE 90 MILLILITER(S): 9 INJECTION, SOLUTION INTRAVENOUS at 09:12

## 2025-01-01 NOTE — DIETITIAN INITIAL EVALUATION ADULT - NS FNS WEIGHT CHANGE REASON
PT reported his UBW 175lbs 2 yrs ago. Most recent adm weight 115.1lbs. However pt with moderate ascites which is likely masking his weight loss. As per HIE weight hx, pt's previous weights 125lbs (12/13/24), 130.1 (11/4/24). 125 (10/11/24). Pt also reported his recent weight obtained 2 wks ago ~128lbs. Weight reflects significant weight loss of 10lbs/8% x 2months./unintentional

## 2025-01-01 NOTE — PROGRESS NOTE ADULT - ASSESSMENT
70 y/o M with PMHx pancreatic adenocarcinoma s/p radiation and palliative chemotherapy, DM, afib on Eliquis/Dig/Metoprolol, CAD (on ASA) and HLD presenting with gastric outlet obstruction.  Pt was recently admitted and discharged on NYP-Q  for gastric outlet obstruction, mild pyloric extrinsic stenosis, and impaired gastric motility. CT scan at the outside hospital demonstrated 7.7 cm ill-defined pancreatic mass that has increased in size and intra and extrahepatic biliary ductal dilation likely due to obstruction. Pt is now s/p EGD, and NG and NJ placement for decompression and TF with GI on 12/31.     Plan:  - NGT to LCWS/NPO/IVF  - NJ Tube feeds to start at 10cc today, held at midnight for procedure  - Pre-op labs today  - GI c/s appreciated- plan for biliary stenting tomorrow.   - Coags elevated with INR 2.5 on admission, improved to 1.7 this AM after 1 dose of vitamin K\  - Vitamin K today, follow up Coags tomorrow AM.   - DVT ppx held given INR and procedure tomorrow  - low dose ISS  - IV metop 5 q6h  - IV Dig 125mcg  - OOB and ambulating as tolerated  - F/u AM labs    E Team Surgery   [id number].

## 2025-01-01 NOTE — DIETITIAN INITIAL EVALUATION ADULT - NSFNSGIIOFT_GEN_A_CORE
01-01-25 @ 07:01  -  01-01-25 @ 14:02  --------------------------------------------------------  OUT:    Nasogastric/Oral tube (mL): 300 mL  Total OUT: 300 mL    Total NET: -290 mL

## 2025-01-01 NOTE — DIETITIAN INITIAL EVALUATION ADULT - PERTINENT MEDS FT
MEDICATIONS  (STANDING):  dextrose 5%. 1000 milliLiter(s) (50 mL/Hr) IV Continuous <Continuous>  dextrose 5%. 1000 milliLiter(s) (100 mL/Hr) IV Continuous <Continuous>  dextrose 50% Injectable 25 Gram(s) IV Push once  dextrose 50% Injectable 12.5 Gram(s) IV Push once  dextrose 50% Injectable 25 Gram(s) IV Push once  digoxin  Injectable 125 MICROGram(s) IV Push daily  glucagon  Injectable 1 milliGRAM(s) IntraMuscular once  influenza  Vaccine (HIGH DOSE) 0.5 milliLiter(s) IntraMuscular once  insulin lispro (ADMELOG) corrective regimen sliding scale   SubCutaneous every 6 hours  lactated ringers 1000 milliLiter(s) (90 mL/Hr) IV Continuous <Continuous>  metoprolol tartrate Injectable 5 milliGRAM(s) IV Push every 6 hours    MEDICATIONS  (PRN):  acetaminophen   IVPB .. 1000 milliGRAM(s) IV Intermittent every 6 hours PRN Mild Pain (1 - 3), Moderate Pain (4 - 6), Severe Pain (7 - 10)  dextrose Oral Gel 15 Gram(s) Oral once PRN Blood Glucose LESS THAN 70 milliGRAM(s)/deciliter

## 2025-01-01 NOTE — DIETITIAN INITIAL EVALUATION ADULT - OTHER INFO
Per chart review, 70 y/o M with PMHx pancreatic adenocarcinoma s/p radiation and palliative chemotherapy, DM, afib ob Eliquis, CAD (on DAPT) and HLD presenting for a second opinion with Dr. Toro Alonso. Pt was recently admitted and discharged on NYP-Q  for gastric outlet obstruction, mild pyloric extrinsic stenosis, and impaired gastric motility. CT scan at the outside hospital demonstrated "7.7 cm ill-defined pancreatic mass encases in size since the prior exam and now causing narrowing of the fist portion of duodenum and gastric outlet obstruction. Intra and extrahepatic biliary ductal dilation likely due to obstruction. Thrombosis of the splenic vein. lack of visualization of portal confluence may represent thrombosis versus compression. Moderate ascites increased since the prior exam.     Patient seen at bedside, NJ tube in placed. TF started on Pivot 1.5 running at 10ml/hr. Currently TF provided total volume of 240ml/day, 260cal,23gm pro which is not meeting his nutrition needs. Nutrition interview conducted in Mandarin pt's native language, writer is also fluent in Mandarin. Pt reports currently tolerating Pivot at current rate w/o any GI distress. Reported LBM 2days ago. IV hydration provided.  Pt noted with gastric outlet obstructions. Recommend when medically feasible gradually increase Pivot 1.5 by 10ml/8 hrs to meet goal rate 50ml/hrx24 hrs, if TF running at goal rate will provide total volume of 1200ml, 1800cal, 112.8gm pro, and 900 free water flushes. Pt has no nutrition related questions nor concerns at this time. RD to remain available for further nutritional interventions as indicated.  Per chart review, 70 y/o M with PMHx pancreatic adenocarcinoma s/p radiation and palliative chemotherapy, DM, afib ob Eliquis, CAD (on DAPT) and HLD presenting for a second opinion with Dr. Toro Alonso. Pt was recently admitted and discharged on NYP-Q  for gastric outlet obstruction, mild pyloric extrinsic stenosis, and impaired gastric motility. CT scan at the outside hospital demonstrated "7.7 cm ill-defined pancreatic mass encases in size since the prior exam and now causing narrowing of the fist portion of duodenum and gastric outlet obstruction. Intra and extrahepatic biliary ductal dilation likely due to obstruction. Thrombosis of the splenic vein. lack of visualization of portal confluence may represent thrombosis versus compression. Moderate ascites increased since the prior exam.     Patient seen at bedside, NJ tube in placed. Pt started on Pivot 1.5 as per chart review. TF visibly running at 10ml/hr during the visit. Currently TF provides total volume of 240ml/day, 360cal, [id number] pro which is not meeting his protein and calorie nutritional needs. Nutrition interview conducted in Mandarin pt's native language, writer is also fluent in Mandarin. Pt reports tolerating Pivot at current rate w/o any GI dicomfort. Reported LBM 2days ago. IV hydration provided.  Pt noted with gastric outlet obstructions. Recommend when medically feasible gradually increase Pivot 1.5 by 10ml/8 hrs to meet goal rate 50ml/hrx24 hrs, if TF running at goal rate will provide total volume of 1200ml, 1800cal, 112.8gm pro, and 900 free water flushes. Pt has no nutrition related questions nor concerns at this time. RD to remain available for further nutritional interventions as indicated.

## 2025-01-01 NOTE — CONSULT NOTE ADULT - ASSESSMENT
Pre-Operative Cardiac Risk Stratification and Optimization   Based on patient history and physical exam, the patient is considered to have elevated risk   Echo shows normal LV / RV function   Has no active CV symptoms  exercise capacity is greater than 4 METs.   can proceed to planned EGD and surgery     PAF  in sinus  cont current meds  resume a/c once deemed safe from surgery   check Digoxin level     CAD  s/p Stents   cont BB  add statin   resume asa      Advanced care planning was discussed with patient and family.  Risks, benefits and alternatives of the cardiac treatments and medical therapy including procedures were discussed in detail and all questions were answered. Importance of compliance with medical therapy and lifestyle modification to improve cardiovascular health were addressed. Appropriate forms and patient educational materials were reviewed. 30 minutes face to face spent.

## 2025-01-01 NOTE — PROGRESS NOTE ADULT - SUBJECTIVE AND OBJECTIVE BOX
Morning Surgical Progress Note  Patient is a 69y old  Male who presents with a chief complaint of Nausea and vomiting     (01 Jan 2025 11:02)    INTERVAL: Patient underwent an EGD with NGT and NJT placement with GI.     SUBJECTIVE: Patient seen and examined at bedside with surgical team. Patient denies chest pain, difficulty breathing, nausea or vomiting.     Vital Signs Last 24 Hrs  T(C): 37 (01 Jan 2025 14:26), Max: 37.2 (31 Dec 2024 15:53)  T(F): 98.6 (01 Jan 2025 14:26), Max: 98.9 (31 Dec 2024 15:53)  HR: 70 (01 Jan 2025 14:26) (52 - 72)  BP: 132/80 (01 Jan 2025 14:26) (124/80 - 148/84)  BP(mean): --  RR: 18 (01 Jan 2025 14:26) (12 - 18)  SpO2: 98% (01 Jan 2025 14:26) (96% - 100%)    Parameters below as of 01 Jan 2025 14:26  Patient On (Oxygen Delivery Method): room air    I&O's Detail    31 Dec 2024 07:01  -  01 Jan 2025 07:00  --------------------------------------------------------  IN:    Lactated Ringers w/ Additives: 900 mL  Total IN: 900 mL    OUT:    Oral Fluid: 0 mL    Voided (mL): 600 mL  Total OUT: 600 mL    Total NET: 300 mL      01 Jan 2025 07:01  -  01 Jan 2025 15:17  --------------------------------------------------------  IN:    Lactated Ringers w/ Additives: 540 mL    Pivot 1.5: 10 mL  Total IN: 550 mL    OUT:    Nasogastric/Oral tube (mL): 300 mL    Oral Fluid: 0 mL    Voided (mL): 0 mL  Total OUT: 300 mL    Total NET: 250 mL        Medications  MEDICATIONS  (STANDING):  dextrose 5%. 1000 milliLiter(s) (50 mL/Hr) IV Continuous <Continuous>  dextrose 5%. 1000 milliLiter(s) (100 mL/Hr) IV Continuous <Continuous>  dextrose 50% Injectable 25 Gram(s) IV Push once  dextrose 50% Injectable 12.5 Gram(s) IV Push once  dextrose 50% Injectable 25 Gram(s) IV Push once  digoxin  Injectable 125 MICROGram(s) IV Push daily  glucagon  Injectable 1 milliGRAM(s) IntraMuscular once  influenza  Vaccine (HIGH DOSE) 0.5 milliLiter(s) IntraMuscular once  insulin lispro (ADMELOG) corrective regimen sliding scale   SubCutaneous every 6 hours  lactated ringers 1000 milliLiter(s) (90 mL/Hr) IV Continuous <Continuous>  metoprolol tartrate Injectable 5 milliGRAM(s) IV Push every 6 hours    MEDICATIONS  (PRN):  acetaminophen   IVPB .. 1000 milliGRAM(s) IV Intermittent every 6 hours PRN Mild Pain (1 - 3), Moderate Pain (4 - 6), Severe Pain (7 - 10)  dextrose Oral Gel 15 Gram(s) Oral once PRN Blood Glucose LESS THAN 70 milliGRAM(s)/deciliter    Physical Exam  General: NAD, resting comfortably in bed, NG/NJ tube bridled   Pulmonary: Nonlabored breathing, no respiratory distress  Cardiovascular: NSR/Sinus Bradycardia on monitor  Abdominal: soft, NT/ND, NGT with bilious output.   Extremities: WWP    LABS:                        9.6    3.33  )-----------( 124      ( 01 Jan 2025 05:22 )             28.3     01-01    135  |  99  |  14  ----------------------------<  112[H]  4.5   |  21[L]  |  0.69    Ca    9.1      01 Jan 2025 05:22  Phos  3.7     01-01  Mg     2.20     01-01    TPro  6.4  /  Alb  3.4  /  TBili  0.8  /  DBili  x   /  AST  36  /  ALT  29  /  AlkPhos  510[H]  01-01    PT/INR - ( 01 Jan 2025 05:22 )   PT: 19.6 sec;   INR: 1.70 ratio         PTT - ( 01 Jan 2025 05:22 )  PTT:33.6 sec  LIVER FUNCTIONS - ( 01 Jan 2025 05:22 )  Alb: 3.4 g/dL / Pro: 6.4 g/dL / ALK PHOS: 510 U/L / ALT: 29 U/L / AST: 36 U/L / GGT: x           Urinalysis Basic - ( 01 Jan 2025 05:22 )    Color: x / Appearance: x / SG: x / pH: x  Gluc: 112 mg/dL / Ketone: x  / Bili: x / Urobili: x   Blood: x / Protein: x / Nitrite: x   Leuk Esterase: x / RBC: x / WBC x   Sq Epi: x / Non Sq Epi: x / Bacteria: x

## 2025-01-01 NOTE — CHART NOTE - NSCHARTNOTEFT_GEN_A_CORE
Plan for EGD with duodenal stent placement tomorrow 1/2/25  - Keep NPO at MN  - Send early am preop labs (cbc, bmp, coags T&S)  - Cardiac clearance appreciated  - Hold AC, INR goal<1.8 for procedure    Laura Medina, PGY4  Gastroenterology/Hepatology Fellow  Available on [employer] Teams  [phone] (Long Range Pager)  [phone] (Short Range Pager [facility])    After 5 pm, please contact the on-call GI fellow for any urgent issues via the Hospital Call  Plan for EGD with duodenal stent placement tomorrow 1/2/25  - Keep NPO and hold TFs at MN  - Send early am preop labs (cbc, bmp, coags T&S)  - Cardiac clearance appreciated  - Hold AC, INR goal<1.8 for procedure    Laura Medina, PGY4  Gastroenterology/Hepatology Fellow  Available on [employer] Teams  [phone] (Long Range Pager)  [phone] (Short Range Pager [facility])    After 5 pm, please contact the on-call GI fellow for any urgent issues via the Hospital Call

## 2025-01-01 NOTE — DIETITIAN INITIAL EVALUATION ADULT - ORAL INTAKE PTA/DIET HISTORY
Patient reports her appetite has been poor at baseline due to his chemo treatment over the past 2 years, consumes 3 small meals with a protein shake in between his meals (pt unable to remember the brand of the protein shake). Pt reports 1 month of worsening PO intake due to nausea and vomiting. Pt reported UBW 175lbs 2 years ago, and recent weight 128lbs 2 wks ago. Most recent adm weight 115.1lbs (12/31/24).  Patient reports his appetite has been poor at baseline due to his chemo treatment over the past 2 years, consumes 3 small meals with a protein shake in between his meals (pt unable to remember the brand of the protein shake). Pt reports 1 month of worsening PO intake due to nausea and vomiting. Pt reported UBW 175lbs 2 years ago, and recent weight 128lbs 2 wks ago. Most recent adm weight 115.1lbs (12/31/24).  Patient reports his appetite has been poor at baseline due to his chemo treatment over the past 2 years, consumes 3 small meals with a protein shake in between his meals (pt unable to remember the name of the protein shake). Pt reports 1 month of worsening PO intake due to nausea and vomiting. Pt reported UBW 175lbs 2 years ago, and recent weight 128lbs 2 wks ago. Most recent adm weight 115.1lbs (12/31/24).

## 2025-01-01 NOTE — PATIENT PROFILE ADULT - NSPROPTRIGHTSUPPORTNAME_GEN_A_NUR
Hypertension stable however in light of kidney function change we will stop lisinopril and start patient on amlodipine 5 mg daily Kimberli Cifuentes

## 2025-01-01 NOTE — DIETITIAN INITIAL EVALUATION ADULT - ADD RECOMMEND
1) Monitor TF tolerance, Labs, weights, BMs, and skin integrity.   2) RD to remain available for further nutritional interventions as indicated.

## 2025-01-01 NOTE — DIETITIAN INITIAL EVALUATION ADULT - PERTINENT LABORATORY DATA
01-01    135  |  99  |  14  ----------------------------<  112[H]  4.5   |  21[L]  |  0.69    Ca    9.1      01 Jan 2025 05:22  Phos  3.7     01-01  Mg     2.20     01-01    TPro  6.4  /  Alb  3.4  /  TBili  0.8  /  DBili  x   /  AST  36  /  ALT  29  /  AlkPhos  510[H]  01-01  POCT Blood Glucose.: 105 mg/dL (01-01-25 @ 12:57)  A1C with Estimated Average Glucose Result: 5.2 % (12-31-24 @ 12:19)

## 2025-01-01 NOTE — PATIENT PROFILE ADULT - FALL HARM RISK - HARM RISK INTERVENTIONS

## 2025-01-01 NOTE — DIETITIAN INITIAL EVALUATION ADULT - NS FNS DIET ORDER
Diet, NPO with Tube Feed:   Tube Feeding Modality: Nasojejunal  Pivot 1.5 Nakul (PIVOT1.5RTH)  Continuous  Starting Tube Feed Rate {mL per Hour}: 10     Every 6 hours  Until Goal Tube Feed Rate (mL per Hour): 10  Tube Feed Duration (in Hours): 24  Tube Feed Start Time: 08:00 (01-01-25 @ 07:21)

## 2025-01-01 NOTE — DIETITIAN INITIAL EVALUATION ADULT - ENTERAL
Recommend increase Pivot 1.5 when medically feasible by 10ml q 8 hrs to meet goal rate 50ml/hr x 24 hrs to provide total volume of 1200ml/day. If TF running at goal rate will provide total 1800cal (25cal/kg of IBW), 112.8gm pro (1.5gm pro/ kg of IBW), 900ml of free water. Free water flushes defer to medical team's discretion.

## 2025-01-02 LAB
ALBUMIN SERPL ELPH-MCNC: 3.2 G/DL — LOW (ref 3.3–5)
ALP SERPL-CCNC: 448 U/L — HIGH (ref 40–120)
ALT FLD-CCNC: 22 U/L — SIGNIFICANT CHANGE UP (ref 4–41)
ANION GAP SERPL CALC-SCNC: 9 MMOL/L — SIGNIFICANT CHANGE UP (ref 7–14)
APTT BLD: 31.5 SEC — SIGNIFICANT CHANGE UP (ref 24.5–35.6)
AST SERPL-CCNC: 27 U/L — SIGNIFICANT CHANGE UP (ref 4–40)
BILIRUB DIRECT SERPL-MCNC: 0.3 MG/DL — SIGNIFICANT CHANGE UP (ref 0–0.3)
BILIRUB INDIRECT FLD-MCNC: 0.6 MG/DL — SIGNIFICANT CHANGE UP (ref 0–1)
BILIRUB SERPL-MCNC: 0.9 MG/DL — SIGNIFICANT CHANGE UP (ref 0.2–1.2)
BLD GP AB SCN SERPL QL: NEGATIVE — SIGNIFICANT CHANGE UP
BUN SERPL-MCNC: 14 MG/DL — SIGNIFICANT CHANGE UP (ref 7–23)
CALCIUM SERPL-MCNC: 8.9 MG/DL — SIGNIFICANT CHANGE UP (ref 8.4–10.5)
CHLORIDE SERPL-SCNC: 102 MMOL/L — SIGNIFICANT CHANGE UP (ref 98–107)
CO2 SERPL-SCNC: 24 MMOL/L — SIGNIFICANT CHANGE UP (ref 22–31)
CREAT SERPL-MCNC: 0.63 MG/DL — SIGNIFICANT CHANGE UP (ref 0.5–1.3)
EGFR: 102 ML/MIN/1.73M2 — SIGNIFICANT CHANGE UP
GLUCOSE BLDC GLUCOMTR-MCNC: 106 MG/DL — HIGH (ref 70–99)
GLUCOSE BLDC GLUCOMTR-MCNC: 163 MG/DL — HIGH (ref 70–99)
GLUCOSE BLDC GLUCOMTR-MCNC: 180 MG/DL — HIGH (ref 70–99)
GLUCOSE BLDC GLUCOMTR-MCNC: 81 MG/DL — SIGNIFICANT CHANGE UP (ref 70–99)
GLUCOSE BLDC GLUCOMTR-MCNC: 98 MG/DL — SIGNIFICANT CHANGE UP (ref 70–99)
GLUCOSE SERPL-MCNC: 84 MG/DL — SIGNIFICANT CHANGE UP (ref 70–99)
HCT VFR BLD CALC: 29.8 % — LOW (ref 39–50)
HGB BLD-MCNC: 10.2 G/DL — LOW (ref 13–17)
INR BLD: 1.33 RATIO — HIGH (ref 0.85–1.16)
MAGNESIUM SERPL-MCNC: 2.1 MG/DL — SIGNIFICANT CHANGE UP (ref 1.6–2.6)
MCHC RBC-ENTMCNC: 31.6 PG — SIGNIFICANT CHANGE UP (ref 27–34)
MCHC RBC-ENTMCNC: 34.2 G/DL — SIGNIFICANT CHANGE UP (ref 32–36)
MCV RBC AUTO: 92.3 FL — SIGNIFICANT CHANGE UP (ref 80–100)
NRBC # BLD: 0 /100 WBCS — SIGNIFICANT CHANGE UP (ref 0–0)
NRBC # FLD: 0 K/UL — SIGNIFICANT CHANGE UP (ref 0–0)
PHOSPHATE SERPL-MCNC: 2 MG/DL — LOW (ref 2.5–4.5)
PLATELET # BLD AUTO: 131 K/UL — LOW (ref 150–400)
POTASSIUM SERPL-MCNC: 4.2 MMOL/L — SIGNIFICANT CHANGE UP (ref 3.5–5.3)
POTASSIUM SERPL-SCNC: 4.2 MMOL/L — SIGNIFICANT CHANGE UP (ref 3.5–5.3)
PROT SERPL-MCNC: 6.2 G/DL — SIGNIFICANT CHANGE UP (ref 6–8.3)
PROTHROM AB SERPL-ACNC: 15.8 SEC — HIGH (ref 9.9–13.4)
RBC # BLD: 3.23 M/UL — LOW (ref 4.2–5.8)
RBC # FLD: 12.1 % — SIGNIFICANT CHANGE UP (ref 10.3–14.5)
RH IG SCN BLD-IMP: POSITIVE — SIGNIFICANT CHANGE UP
SODIUM SERPL-SCNC: 135 MMOL/L — SIGNIFICANT CHANGE UP (ref 135–145)
WBC # BLD: 4.26 K/UL — SIGNIFICANT CHANGE UP (ref 3.8–10.5)
WBC # FLD AUTO: 4.26 K/UL — SIGNIFICANT CHANGE UP (ref 3.8–10.5)

## 2025-01-02 PROCEDURE — 43266 EGD ENDOSCOPIC STENT PLACE: CPT

## 2025-01-02 PROCEDURE — 43262 ENDO CHOLANGIOPANCREATOGRAPH: CPT | Mod: 59

## 2025-01-02 PROCEDURE — 43274 ERCP DUCT STENT PLACEMENT: CPT

## 2025-01-02 DEVICE — GWIRE JAGTOME REVOLUTION RX 260CM/0.025IN: Type: IMPLANTABLE DEVICE | Status: FUNCTIONAL

## 2025-01-02 DEVICE — STENT BIL WALL RX FC RMV US 8X60MM: Type: IMPLANTABLE DEVICE | Status: FUNCTIONAL

## 2025-01-02 DEVICE — HYDRA WIRE: Type: IMPLANTABLE DEVICE | Status: FUNCTIONAL

## 2025-01-02 DEVICE — CLIP RESOLUTION 360 235CM: Type: IMPLANTABLE DEVICE | Status: FUNCTIONAL

## 2025-01-02 DEVICE — STENT DUOD WALLFLX 27X22X9X230 TTS OTW: Type: IMPLANTABLE DEVICE | Status: FUNCTIONAL

## 2025-01-02 DEVICE — BLLN EXTRCTR PRO RX-S 9-12MM: Type: IMPLANTABLE DEVICE | Status: FUNCTIONAL

## 2025-01-02 RX ORDER — SODIUM PHOSPHATE, MONOBASIC, MONOHYDRATE AND SODIUM PHOSPHATE, DIBASIC ANHYDROUS 142; 276 MG/ML; MG/ML
15 INJECTION, SOLUTION INTRAVENOUS ONCE
Refills: 0 | Status: COMPLETED | OUTPATIENT
Start: 2025-01-02 | End: 2025-01-02

## 2025-01-02 RX ORDER — HYDROMORPHONE HCL 4 MG
0.5 TABLET ORAL EVERY 4 HOURS
Refills: 0 | Status: DISCONTINUED | OUTPATIENT
Start: 2025-01-02 | End: 2025-01-03

## 2025-01-02 RX ORDER — HYDROMORPHONE HCL 4 MG
0.2 TABLET ORAL EVERY 4 HOURS
Refills: 0 | Status: DISCONTINUED | OUTPATIENT
Start: 2025-01-02 | End: 2025-01-03

## 2025-01-02 RX ORDER — HYDROMORPHONE HCL 4 MG
0.2 TABLET ORAL ONCE
Refills: 0 | Status: DISCONTINUED | OUTPATIENT
Start: 2025-01-02 | End: 2025-01-02

## 2025-01-02 RX ORDER — ONDANSETRON 4 MG/1
4 TABLET ORAL ONCE
Refills: 0 | Status: COMPLETED | OUTPATIENT
Start: 2025-01-02 | End: 2025-01-02

## 2025-01-02 RX ORDER — GABAPENTIN 300 MG/1
400 CAPSULE ORAL EVERY 8 HOURS
Refills: 0 | Status: DISCONTINUED | OUTPATIENT
Start: 2025-01-02 | End: 2025-01-03

## 2025-01-02 RX ORDER — METHADONE HYDROCHLORIDE 10 MG/1
10 TABLET ORAL EVERY 8 HOURS
Refills: 0 | Status: DISCONTINUED | OUTPATIENT
Start: 2025-01-02 | End: 2025-01-03

## 2025-01-02 RX ORDER — SODIUM CHLORIDE, SODIUM GLUCONATE, SODIUM ACETATE, POTASSIUM CHLORIDE AND MAGNESIUM CHLORIDE 30; 37; 368; 526; 502 MG/100ML; MG/100ML; MG/100ML; MG/100ML; MG/100ML
1000 INJECTION, SOLUTION INTRAVENOUS
Refills: 0 | Status: DISCONTINUED | OUTPATIENT
Start: 2025-01-02 | End: 2025-01-03

## 2025-01-02 RX ORDER — ONDANSETRON 4 MG/1
4 TABLET ORAL ONCE
Refills: 0 | Status: DISCONTINUED | OUTPATIENT
Start: 2025-01-02 | End: 2025-01-03

## 2025-01-02 RX ADMIN — Medication 0.2 MILLIGRAM(S): at 21:14

## 2025-01-02 RX ADMIN — Medication 0.2 MILLIGRAM(S): at 18:39

## 2025-01-02 RX ADMIN — ACETAMINOPHEN 1000 MILLIGRAM(S): 80 SOLUTION/ DROPS ORAL at 05:15

## 2025-01-02 RX ADMIN — SODIUM CHLORIDE, SODIUM GLUCONATE, SODIUM ACETATE, POTASSIUM CHLORIDE AND MAGNESIUM CHLORIDE 90 MILLILITER(S): 30; 37; 368; 526; 502 INJECTION, SOLUTION INTRAVENOUS at 05:20

## 2025-01-02 RX ADMIN — Medication 0.2 MILLIGRAM(S): at 19:00

## 2025-01-02 RX ADMIN — Medication 0.2 MILLIGRAM(S): at 22:00

## 2025-01-02 RX ADMIN — ACETAMINOPHEN 400 MILLIGRAM(S): 80 SOLUTION/ DROPS ORAL at 17:26

## 2025-01-02 RX ADMIN — Medication 1: at 17:27

## 2025-01-02 RX ADMIN — Medication 0.2 MILLIGRAM(S): at 03:50

## 2025-01-02 RX ADMIN — ONDANSETRON 4 MILLIGRAM(S): 4 TABLET ORAL at 03:50

## 2025-01-02 RX ADMIN — SODIUM PHOSPHATE, MONOBASIC, MONOHYDRATE AND SODIUM PHOSPHATE, DIBASIC ANHYDROUS 63.75 MILLIMOLE(S): 142; 276 INJECTION, SOLUTION INTRAVENOUS at 10:03

## 2025-01-02 RX ADMIN — METHADONE HYDROCHLORIDE 10 MILLIGRAM(S): 10 TABLET ORAL at 20:52

## 2025-01-02 RX ADMIN — ACETAMINOPHEN 400 MILLIGRAM(S): 80 SOLUTION/ DROPS ORAL at 03:34

## 2025-01-02 RX ADMIN — GABAPENTIN 400 MILLIGRAM(S): 300 CAPSULE ORAL at 21:19

## 2025-01-02 RX ADMIN — Medication 0.2 MILLIGRAM(S): at 05:15

## 2025-01-02 NOTE — CHART NOTE - NSCHARTNOTEFT_GEN_A_CORE
POST-PROCEDURE CHECK     Subjective:  Patient is s/p EGD with sphincetoeromy and duodenal stent placement with GI. Pt endorsing epigastric abdominal pain and mild nausea. Otherwise denies CP/SOB    Vital Signs Last 24 Hrs  T(C): 36.8 (02 Jan 2025 19:34), Max: 37.1 (02 Jan 2025 12:09)  T(F): 98.2 (02 Jan 2025 19:34), Max: 98.7 (02 Jan 2025 12:09)  HR: 54 (02 Jan 2025 19:34) (46 - 55)  BP: 140/76 (02 Jan 2025 19:34) (125/87 - 154/62)  BP(mean): --  RR: 18 (02 Jan 2025 19:34) (12 - 18)  SpO2: 99% (02 Jan 2025 19:34) (94% - 100%)    Parameters below as of 02 Jan 2025 19:34  Patient On (Oxygen Delivery Method): room air      I&O's Detail    01 Jan 2025 07:01  -  02 Jan 2025 07:00  --------------------------------------------------------  IN:    Lactated Ringers w/ Additives: 1980 mL    Pivot 1.5: 100 mL  Total IN: 2080 mL    OUT:    Nasogastric/Oral tube (mL): 800 mL    Oral Fluid: 0 mL    Voided (mL): 1100 mL  Total OUT: 1900 mL    Total NET: 180 mL      02 Jan 2025 07:01  -  02 Jan 2025 21:05  --------------------------------------------------------  IN:    dextrose 5% + sodium chloride 0.45% w/ Additives: 990 mL  Total IN: 990 mL    OUT:    Nasogastric/Oral tube (mL): 200 mL    Oral Fluid: 0 mL    Voided (mL): 600 mL  Total OUT: 800 mL    Total NET: 190 mL        digoxin  Injectable 125  metoprolol tartrate Injectable 5    PAST MEDICAL & SURGICAL HISTORY:  Hypertension      CAD (coronary artery disease)      Stented coronary artery  x 1 stent      Stented coronary artery            Physical Exam:  General: NAD  Pulmonary: Nonlabored breathing, no respiratory distress  Cardiovascular: NSR  Abdominal: soft, mild TTP in epigastric pain, non distended  Extremities: WWP      LABS:                        10.2   4.26  )-----------( 131      ( 02 Jan 2025 03:51 )             29.8     01-02    135  |  102  |  14  ----------------------------<  84  4.2   |  24  |  0.63    Ca    8.9      02 Jan 2025 03:51  Phos  2.0     01-02  Mg     2.10     01-02    TPro  6.2  /  Alb  3.2[L]  /  TBili  0.9  /  DBili  0.3  /  AST  27  /  ALT  22  /  AlkPhos  448[H]  01-02    PT/INR - ( 02 Jan 2025 03:51 )   PT: 15.8 sec;   INR: 1.33 ratio         PTT - ( 02 Jan 2025 03:51 )  PTT:31.5 sec  CAPILLARY BLOOD GLUCOSE      POCT Blood Glucose.: 163 mg/dL (02 Jan 2025 16:53)  POCT Blood Glucose.: 98 mg/dL (02 Jan 2025 12:26)  POCT Blood Glucose.: 106 mg/dL (02 Jan 2025 11:20)  POCT Blood Glucose.: 81 mg/dL (02 Jan 2025 05:10)  POCT Blood Glucose.: 94 mg/dL (01 Jan 2025 23:27)      Radiology and Additional Studies:    Assessment:  70 y/o M with PMHx pancreatic adenocarcinoma s/p radiation and palliative chemotherapy, DM, afib on Eliquis/Dig/Metoprolol, CAD (on ASA) and HLD presenting with gastric outlet obstruction.  Pt was recently admitted and discharged on NYP-Q  for gastric outlet obstruction, mild pyloric extrinsic stenosis, and impaired gastric motility. CT scan at the outside hospital demonstrated 7.7 cm ill-defined pancreatic mass that has increased in size and intra and extrahepatic biliary ductal dilation likely due to obstruction. Pt is now s/p EGD, and NG and NJ placement for decompression and TF with GI on 12/31. Pt is now additionally s/p EGD with duodenal stent placement (1/2/25),     Plan:  - Pain control as needed-- per patient daughter, patient is on methadone 10 q8h, gabapentin 400 q8h, and dilaudid 8 q4h PRN for PRN. Plan for multimodal pain control with Tylenol, Dilaudid, gabapentin, and methadone   - Zofran x1 dose for post procedural nausea  - CLD, IVF  - per GI can resume AC tomorrow   - low dose ISS  - IV metop q6h  - IV Dig 125 mcg  - OOB and ambulating as tolerated  - F/u AM labs

## 2025-01-02 NOTE — CONSULT NOTE ADULT - NS ATTEND AMEND GEN_ALL_CORE FT
Agree with above assessment and plan.   69 year old male with unresectable stage III pancreatic adenocarcinoma s/p multiple lines of therapy including FOLFIRINOX, chemoRT with Xeloda, and more recently FOLFOX x2 cycles but stopped due to intolerance. Had a celiac plexus block prior as well. Palliative intent treatment, following with Dr Hernández at [facility], was awaiting further imaging for further assessment of future potential therapies. Now with GOO s/p biliary stent placement, s/p EGD, and NG and NJ placement for decompression and TF with GI on 12/31. Unclear if following at [facility]/ as only seen once at [facility] with Dr Hernández for consultation. May be on other therapy however no records available. Please obtain records from [facility]. Bilirubin is normal. Mild thrombocytopenia, monitor.

## 2025-01-02 NOTE — PROGRESS NOTE ADULT - SUBJECTIVE AND OBJECTIVE BOX
Morning Surgical Progress Note  Patient is a 69y old  Male who presents with a chief complaint of abdominal pain; h/o GOO; locally advanced pancreatic cancer. (01 Jan 2025 15:17)    INTERVAL: NAEO    SUBJECTIVE: Patient seen and examined at bedside with surgical team.    Vital Signs Last 24 Hrs  T(C): 36.8 (02 Jan 2025 05:05), Max: 37 (01 Jan 2025 14:26)  T(F): 98.2 (02 Jan 2025 05:05), Max: 98.6 (01 Jan 2025 14:26)  HR: 54 (02 Jan 2025 05:05) (50 - 70)  BP: 147/86 (02 Jan 2025 05:05) (120/70 - 148/79)  BP(mean): --  RR: 17 (02 Jan 2025 05:05) (17 - 18)  SpO2: 98% (02 Jan 2025 05:05) (95% - 98%)    Parameters below as of 02 Jan 2025 05:05  Patient On (Oxygen Delivery Method): room air    I&O's Detail    31 Dec 2024 07:01  -  01 Jan 2025 07:00  --------------------------------------------------------  IN:    Lactated Ringers w/ Additives: 900 mL  Total IN: 900 mL    OUT:    Oral Fluid: 0 mL    Voided (mL): 600 mL  Total OUT: 600 mL    Total NET: 300 mL      01 Jan 2025 07:01  -  02 Jan 2025 06:09  --------------------------------------------------------  IN:    Lactated Ringers w/ Additives: 1980 mL    Pivot 1.5: 100 mL  Total IN: 2080 mL    OUT:    Nasogastric/Oral tube (mL): 800 mL    Oral Fluid: 0 mL    Voided (mL): 1100 mL  Total OUT: 1900 mL    Total NET: 180 mL        Medications  MEDICATIONS  (STANDING):  dextrose 5% + sodium chloride 0.45% with potassium chloride 20 mEq/L 1000 milliLiter(s) (90 mL/Hr) IV Continuous <Continuous>  dextrose 5%. 1000 milliLiter(s) (50 mL/Hr) IV Continuous <Continuous>  dextrose 5%. 1000 milliLiter(s) (100 mL/Hr) IV Continuous <Continuous>  dextrose 50% Injectable 25 Gram(s) IV Push once  dextrose 50% Injectable 12.5 Gram(s) IV Push once  dextrose 50% Injectable 25 Gram(s) IV Push once  digoxin  Injectable 125 MICROGram(s) IV Push daily  glucagon  Injectable 1 milliGRAM(s) IntraMuscular once  influenza  Vaccine (HIGH DOSE) 0.5 milliLiter(s) IntraMuscular once  insulin lispro (ADMELOG) corrective regimen sliding scale   SubCutaneous every 6 hours  metoprolol tartrate Injectable 5 milliGRAM(s) IV Push every 6 hours  sodium phosphate 15 milliMole(s)/250 mL IVPB 15 milliMole(s) IV Intermittent once    MEDICATIONS  (PRN):  acetaminophen   IVPB .. 1000 milliGRAM(s) IV Intermittent every 6 hours PRN Mild Pain (1 - 3), Moderate Pain (4 - 6), Severe Pain (7 - 10)  dextrose Oral Gel 15 Gram(s) Oral once PRN Blood Glucose LESS THAN 70 milliGRAM(s)/deciliter    Physical Exam  General: NAD, resting comfortably in bed, NG/NJ tube bridled   Pulmonary: Nonlabored breathing, no respiratory distress  Cardiovascular: NSR/Sinus Bradycardia on monitor  Abdominal: soft, NT/ND, NGT with bilious output.   Extremities: WWP    LABS:                        10.2   4.26  )-----------( 131      ( 02 Jan 2025 03:51 )             29.8     01-02    135  |  102  |  14  ----------------------------<  84  4.2   |  24  |  0.63    Ca    8.9      02 Jan 2025 03:51  Phos  2.0     01-02  Mg     2.10     01-02    TPro  6.2  /  Alb  3.2[L]  /  TBili  0.9  /  DBili  0.3  /  AST  27  /  ALT  22  /  AlkPhos  448[H]  01-02    PT/INR - ( 02 Jan 2025 03:51 )   PT: 15.8 sec;   INR: 1.33 ratio         PTT - ( 02 Jan 2025 03:51 )  PTT:31.5 sec  LIVER FUNCTIONS - ( 02 Jan 2025 03:51 )  Alb: 3.2 g/dL / Pro: 6.2 g/dL / ALK PHOS: 448 U/L / ALT: 22 U/L / AST: 27 U/L / GGT: x           Urinalysis Basic - ( 02 Jan 2025 03:51 )    Color: x / Appearance: x / SG: x / pH: x  Gluc: 84 mg/dL / Ketone: x  / Bili: x / Urobili: x   Blood: x / Protein: x / Nitrite: x   Leuk Esterase: x / RBC: x / WBC x   Sq Epi: x / Non Sq Epi: x / Bacteria: x      ABO Interpretation: O (01-02-25 @ 04:55)   Morning Surgical Progress Note  Patient is a 69y old  Male who presents with a chief complaint of abdominal pain; h/o GOO; locally advanced pancreatic cancer. (01 Jan 2025 15:17)    INTERVAL: NAEO    SUBJECTIVE: Patient seen and examined at bedside with surgical team. Patient reports feeling well. Denies any pain.     Vital Signs Last 24 Hrs  T(C): 36.8 (02 Jan 2025 05:05), Max: 37 (01 Jan 2025 14:26)  T(F): 98.2 (02 Jan 2025 05:05), Max: 98.6 (01 Jan 2025 14:26)  HR: 54 (02 Jan 2025 05:05) (50 - 70)  BP: 147/86 (02 Jan 2025 05:05) (120/70 - 148/79)  BP(mean): --  RR: 17 (02 Jan 2025 05:05) (17 - 18)  SpO2: 98% (02 Jan 2025 05:05) (95% - 98%)    Parameters below as of 02 Jan 2025 05:05  Patient On (Oxygen Delivery Method): room air    I&O's Detail    31 Dec 2024 07:01  -  01 Jan 2025 07:00  --------------------------------------------------------  IN:    Lactated Ringers w/ Additives: 900 mL  Total IN: 900 mL    OUT:    Oral Fluid: 0 mL    Voided (mL): 600 mL  Total OUT: 600 mL    Total NET: 300 mL      01 Jan 2025 07:01  -  02 Jan 2025 06:09  --------------------------------------------------------  IN:    Lactated Ringers w/ Additives: 1980 mL    Pivot 1.5: 100 mL  Total IN: 2080 mL    OUT:    Nasogastric/Oral tube (mL): 800 mL    Oral Fluid: 0 mL    Voided (mL): 1100 mL  Total OUT: 1900 mL    Total NET: 180 mL        Medications  MEDICATIONS  (STANDING):  dextrose 5% + sodium chloride 0.45% with potassium chloride 20 mEq/L 1000 milliLiter(s) (90 mL/Hr) IV Continuous <Continuous>  dextrose 5%. 1000 milliLiter(s) (50 mL/Hr) IV Continuous <Continuous>  dextrose 5%. 1000 milliLiter(s) (100 mL/Hr) IV Continuous <Continuous>  dextrose 50% Injectable 25 Gram(s) IV Push once  dextrose 50% Injectable 12.5 Gram(s) IV Push once  dextrose 50% Injectable 25 Gram(s) IV Push once  digoxin  Injectable 125 MICROGram(s) IV Push daily  glucagon  Injectable 1 milliGRAM(s) IntraMuscular once  influenza  Vaccine (HIGH DOSE) 0.5 milliLiter(s) IntraMuscular once  insulin lispro (ADMELOG) corrective regimen sliding scale   SubCutaneous every 6 hours  metoprolol tartrate Injectable 5 milliGRAM(s) IV Push every 6 hours  sodium phosphate 15 milliMole(s)/250 mL IVPB 15 milliMole(s) IV Intermittent once    MEDICATIONS  (PRN):  acetaminophen   IVPB .. 1000 milliGRAM(s) IV Intermittent every 6 hours PRN Mild Pain (1 - 3), Moderate Pain (4 - 6), Severe Pain (7 - 10)  dextrose Oral Gel 15 Gram(s) Oral once PRN Blood Glucose LESS THAN 70 milliGRAM(s)/deciliter    Physical Exam  General: NAD, resting comfortably in bed, NG/NJ tube bridled   Pulmonary: Nonlabored breathing, no respiratory distress  Cardiovascular: NSR/Sinus Bradycardia on monitor  Abdominal: soft, NT/ND, NGT with bilious output.   Extremities: [facility]    LABS:                        10.2   4.26  )-----------( 131      ( 02 Jan 2025 03:51 )             29.8     01-02    135  |  102  |  14  ----------------------------<  84  4.2   |  24  |  0.63    Ca    8.9      02 Jan 2025 03:51  Phos  2.0     01-02  Mg     2.10     01-02    TPro  6.2  /  Alb  3.2[L]  /  TBili  0.9  /  DBili  0.3  /  AST  27  /  ALT  22  /  AlkPhos  448[H]  01-02    PT/INR - ( 02 Jan 2025 03:51 )   PT: 15.8 sec;   INR: 1.33 ratio         PTT - ( 02 Jan 2025 03:51 )  PTT:31.5 sec  LIVER FUNCTIONS - ( 02 Jan 2025 03:51 )  Alb: 3.2 g/dL / Pro: 6.2 g/dL / ALK PHOS: 448 U/L / ALT: 22 U/L / AST: 27 U/L / GGT: x           Urinalysis Basic - ( 02 Jan 2025 03:51 )    Color: x / Appearance: x / SG: x / pH: x  Gluc: 84 mg/dL / Ketone: x  / Bili: x / Urobili: x   Blood: x / Protein: x / Nitrite: x   Leuk Esterase: x / RBC: x / WBC x   Sq Epi: x / Non Sq Epi: x / Bacteria: x      ABO Interpretation: O (01-02-25 @ 04:55)

## 2025-01-02 NOTE — CONSULT NOTE ADULT - REASON FOR ADMISSION
abdominal pain; h/o GOO
abdominal pain; h/o GOO; locally advanced pancreatic cancer.
abdominal pain; h/o GOO; locally advanced pancreatic cancer.

## 2025-01-02 NOTE — PROGRESS NOTE ADULT - ASSESSMENT
70 y/o M with PMHx pancreatic adenocarcinoma s/p radiation and palliative chemotherapy, DM, afib on Eliquis/Dig/Metoprolol, CAD (on ASA) and HLD presenting with gastric outlet obstruction.  Pt was recently admitted and discharged on NYP-Q  for gastric outlet obstruction, mild pyloric extrinsic stenosis, and impaired gastric motility. CT scan at the outside hospital demonstrated 7.7 cm ill-defined pancreatic mass that has increased in size and intra and extrahepatic biliary ductal dilation likely due to obstruction. Pt is now s/p EGD, and NG and NJ placement for decompression and TF with GI on 12/31.     Plan:  - NGT to LCWS/NPO/IVF  - NJ Tube feed held for proc with GI this AM.   - GI c/s appreciated- plan for biliary stenting today  - Coags elevated with INR 2.5 on admission, improved to 1.3 this AM after vitamin K daily x2  - DVT ppx held for proc and initially elevated INR  - low dose ISS  - IV metop 5 q6h  - IV Dig 125mcg  - OOB and ambulating as tolerated  - F/u AM labs    E Team Surgery   [id number].   68 y/o M with PMHx pancreatic adenocarcinoma s/p radiation and palliative chemotherapy, DM, afib on Eliquis/Dig/Metoprolol, CAD (on ASA) and HLD presenting with gastric outlet obstruction.  Pt was recently admitted and discharged on NYP-Q  for gastric outlet obstruction, mild pyloric extrinsic stenosis, and impaired gastric motility. CT scan at the outside hospital demonstrated 7.7 cm ill-defined pancreatic mass that has increased in size and intra and extrahepatic biliary ductal dilation likely due to obstruction. Pt is now s/p EGD, and NG and NJ placement for decompression and TF with GI on 12/31.     Plan:  - NGT to LCWS/NPO/IVF  - NJ Tube feed held for proc with GI this AM.   - GI c/s appreciated- plan for duodenal stenting today possible ERCP  - Coags elevated with INR 2.5 on admission, improved to 1.3 this AM after vitamin K daily x2  - DVT ppx held for proc and initially elevated INR  - low dose ISS  - IV metop 5 q6h  - IV Dig 125mcg  - OOB and ambulating as tolerated  - F/u AM labs    E Team Surgery   [id number].

## 2025-01-02 NOTE — CONSULT NOTE ADULT - ASSESSMENT
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 The primary mass was [id number]. Was started on Neoadjuvant chemo with mFOLFIRINOX in December 2022. Patient had local progression in January when he had severe abdominal pain with involvement of the splenic artery and bifucation of the celiac axis. Celiac nerve block in Feb. Started chemo rads with xeloda on 2/24/23. Finished on 3/29/23.   Restaging scan in June showed involvement of the celiac axis. Started on FOLFOX s/p 2 cycles stopped due to intolerance  -Follow up after discharge    Gastric outlet obstruction  -recently admitted and discharged on NYP-Q  for gastric outlet obstruction, mild pyloric extrinsic stenosis, and impaired gastric motility. CT scan at the outside hospital demonstrated 7.7 cm ill-defined pancreatic mass that has increased in size and intra and extrahepatic biliary ductal dilation likely due to obstruction. Pt is now s/p EGD, and NG and NJ placement for decompression and TF with GI on 12/31.   - NGT to LCWS/NPO/IVF  - NJ Tube feed   -plan for biliary stenting today  CTAP with new intrahepatic biliary duct dilatation suggesting extrahepatic biliary obstruction by tumor. Marked gastric distention may reflect gastric outlet obstruction by tumor. Increasing ascites. No small bowel obstruction.  -Surgery recs appreciated    Will continue to follow    Kerry Kilgore NP  Hematology/Oncology  [facility]  [phone] (Office)  [phone] (Alt office)  Evenings and weekends please call MD on call or office

## 2025-01-02 NOTE — CONSULT NOTE ADULT - SUBJECTIVE AND OBJECTIVE BOX
CHIEF COMPLAINT:Patient is a 69y old  Male who presents with a chief complaint of abdominal pain; h/o GOO (31 Dec 2024 13:54)      HISTORY OF PRESENT ILLNESS:    69 male with history of HTN, PAF on a/c , CAD s/p stents few years ago ,  pancreatic adenocarcinoma s/p radiation and palliative chemotherapy now with gastric outlet obstruction   s/p NGT   planned for EGD and possible surgery   pt denies any chest pain, sob, palpitation, dizziness or syncope.  exercise capacity is greater than 4 METs.         PAST MEDICAL & SURGICAL HISTORY:  Hypertension      CAD (coronary artery disease)      Stented coronary artery  x 1 stent      Stented coronary artery              MEDICATIONS:  digoxin  Injectable 125 MICROGram(s) IV Push daily  metoprolol tartrate Injectable 5 milliGRAM(s) IV Push every 6 hours        acetaminophen   IVPB .. 1000 milliGRAM(s) IV Intermittent every 6 hours      dextrose 50% Injectable 25 Gram(s) IV Push once  dextrose 50% Injectable 12.5 Gram(s) IV Push once  dextrose 50% Injectable 25 Gram(s) IV Push once  dextrose Oral Gel 15 Gram(s) Oral once PRN  glucagon  Injectable 1 milliGRAM(s) IntraMuscular once  insulin lispro (ADMELOG) corrective regimen sliding scale   SubCutaneous every 6 hours    dextrose 5%. 1000 milliLiter(s) IV Continuous <Continuous>  dextrose 5%. 1000 milliLiter(s) IV Continuous <Continuous>  influenza  Vaccine (HIGH DOSE) 0.5 milliLiter(s) IntraMuscular once  lactated ringers 1000 milliLiter(s) IV Continuous <Continuous>  phytonadione  IVPB 5 milliGRAM(s) IV Intermittent once      FAMILY HISTORY:      Non-contributory    SOCIAL HISTORY:    No tobacco, drugs or etoh    Allergies    No Known Allergies    Intolerances    	    REVIEW OF SYSTEMS:  as above  The rest of the 14 points ROS reviewed and except above they are unremarkable.        PHYSICAL EXAM:  T(C): 36.9 (01-01-25 @ 04:55), Max: 37.2 (12-31-24 @ 15:53)  HR: 57 (01-01-25 @ 04:55) (57 - 72)  BP: 137/80 (01-01-25 @ 04:55) (113/70 - 148/84)  RR: 17 (01-01-25 @ 04:55) (12 - 17)  SpO2: 96% (01-01-25 @ 04:55) (96% - 100%)  Wt(kg): --  I&O's Summary    31 Dec 2024 07:01  -  01 Jan 2025 07:00  --------------------------------------------------------  IN: 900 mL / OUT: 600 mL / NET: 300 mL        JVP: Normal  Neck: supple  Lung: clear   CV: S1 S2 , Murmur:  Abd: soft  Ext: No edema  neuro: Awake / alert  Psych: flat affect  Skin: normal      LABS/DATA:    TELEMETRY: 	    ECG:  	sinus , non specific st wave abn   	  CARDIAC MARKERS:                                      9.6    3.33  )-----------( 124      ( 01 Jan 2025 05:22 )             28.3     12-31    135  |  99  |  15  ----------------------------<  108[H]  3.8   |  25  |  0.67    Ca    8.7      31 Dec 2024 12:19  Phos  2.8     12-31  Mg     2.20     12-31    TPro  6.2  /  Alb  3.4  /  TBili  0.6  /  DBili  x   /  AST  42[H]  /  ALT  33  /  AlkPhos  526[H]  12-31    proBNP:   Lipid Profile:   HgA1c:   TSH:       < from: TTE W or WO Ultrasound Enhancing Agent (12.31.24 @ 15:15) >  CONCLUSIONS:      1. Left ventricular systolic function is normal with an ejection fraction of 64 % by Iraheta's method of disks.   2. The right ventricle is not well visualized.   3. Normal left and right atrial size.   4. No pericardial effusion seen.   5. The inferior vena cava is normal in size (normal <2.1cm) with normal inspiratory collapse (normal >50%) consistent with normal right atrial pressure (~3, range 0-5mmHg).   6. Technically difficult image quality.    < end of copied text >      
Reason for consult: pancreatic adeno    HPI:  70 y/o M with PMHx pancreatic adenocarcinoma s/p radiation and palliative chemotherapy, DM, afib ob Eliquis, CAD (on DAPT) and HLD presenting for a second opinion with Dr. Toro Alonso. Pt was recently admitted and discharged on NYP-Q  for gastric outlet obstruction, mild pyloric extrinsic stenosis, and impaired gastric motility. CT scan at the outside hospital demonstrated "7.7 cm ill-defined pancreatic mass encases in size since the prior exam and now causing narrowing of the fist portion of duodenum and gastric outlet obstruction. Intra and extrahepatic biliary ductal dilation likely due to obstruction. Thrombosis of the splenic vein. lack of visualization of portal confluence may represent thrombosis versus compression. Moderate ascites increased since the prior exam.    (31 Dec 2024 11:09)    Oncology consultation completed for this 69 year old male known to [facility] following with Dr Hernández for Pancreatic adenocarcinoma: . Patient was initially diagnosed in Nov 2022 with invasive pancreatic body carcinoma involving the splenic vein and nonspecific lung nodules. The primary mass was [id number]. Patient did not have any other signs of locoregional or distant metastasis. No documentation of diagnostic laparoscopy. Was started on Neoadjuvant chemo with mFOLFIRINOX in December 2022. Patient had local progression in January when he had severe abdominal pain with involvement of the splenic artery and bifucation of the celiac axis. Celiac nerve block in Feb. Started chemo rads with xeloda on 2/24/23. Finished on 3/29/23. Restaging scan in June showed involvement of the celiac axis. Started on FOLFOX s/p 2 cycles stopped due to intolerance. Patient has stage III unresectable locally advanced pancreatic adenocarcinoma without significant clinical response with FOLFIRINOX, chemo RT with xeloda followed  by 2 cycles of FOLFOX.  He is admitted with gastric outlet  obstruction      PAST MEDICAL & SURGICAL HISTORY:  Hypertension      CAD (coronary artery disease)      Stented coronary artery  x 1 stent      Stented coronary artery          FAMILY HISTORY:      Alochol: Denied  Smoking: Nonsmoker  Drug Use: Denied  Marital Status:         Allergies    No Known Allergies    Intolerances        MEDICATIONS  (STANDING):  dextrose 5% + sodium chloride 0.45% with potassium chloride 20 mEq/L 1000 milliLiter(s) (90 mL/Hr) IV Continuous <Continuous>  dextrose 5%. 1000 milliLiter(s) (50 mL/Hr) IV Continuous <Continuous>  dextrose 5%. 1000 milliLiter(s) (100 mL/Hr) IV Continuous <Continuous>  dextrose 50% Injectable 25 Gram(s) IV Push once  dextrose 50% Injectable 12.5 Gram(s) IV Push once  dextrose 50% Injectable 25 Gram(s) IV Push once  digoxin  Injectable 125 MICROGram(s) IV Push daily  glucagon  Injectable 1 milliGRAM(s) IntraMuscular once  influenza  Vaccine (HIGH DOSE) 0.5 milliLiter(s) IntraMuscular once  insulin lispro (ADMELOG) corrective regimen sliding scale   SubCutaneous every 6 hours  metoprolol tartrate Injectable 5 milliGRAM(s) IV Push every 6 hours  sodium phosphate 15 milliMole(s)/250 mL IVPB 15 milliMole(s) IV Intermittent once    MEDICATIONS  (PRN):  acetaminophen   IVPB .. 1000 milliGRAM(s) IV Intermittent every 6 hours PRN Mild Pain (1 - 3), Moderate Pain (4 - 6), Severe Pain (7 - 10)  dextrose Oral Gel 15 Gram(s) Oral once PRN Blood Glucose LESS THAN 70 milliGRAM(s)/deciliter      ROS  No fever, sweats, chills  No epistaxis, HA, sore throat  No CP, SOB, cough, sputum  No n/v/d, abd pain, melena, hematochezia  No edema  No rash  No anxiety  No back pain, joint pain  No bleeding, bruising  No dysuria, hematuria    T(C): 36.8 (01-02-25 @ 05:05), Max: 37 (01-01-25 @ 14:26)  HR: 54 (01-02-25 @ 05:05) (50 - 70)  BP: 147/86 (01-02-25 @ 05:05) (120/70 - 148/79)  RR: 17 (01-02-25 @ 05:05) (17 - 18)  SpO2: 98% (01-02-25 @ 05:05) (95% - 98%)  Wt(kg): --    PE  NAD  Awake, alert  Anicteric, MMM  RRR  CTAB  Abd soft, NT, ND  No c/c/e  No rash grossly                            10.2   4.26  )-----------( 131      ( 02 Jan 2025 03:51 )             29.8       01-02    135  |  102  |  14  ----------------------------<  84  4.2   |  24  |  0.63    Ca    8.9      02 Jan 2025 03:51  Phos  2.0     01-02  Mg     2.10     01-02    TPro  6.2  /  Alb  3.2[L]  /  TBili  0.9  /  DBili  0.3  /  AST  27  /  ALT  22  /  AlkPhos  448[H]  01-02  
Chief Complaint:  Patient is a 69y old  Male who presents with a chief complaint of abdominal pain; h/o GOO (31 Dec 2024 11:09)      HPI: 70 y/o M with PMHx pancreatic adenocarcinoma s/p radiation and palliative chemotherapy, DM, afib ob Eliquis, CAD (on DAPT) and HLD presenting for a second opinion with Dr. Toro Alonso. Pt was recently admitted and discharged on NYP-Q  for gastric outlet obstruction, mild pyloric extrinsic stenosis, and impaired gastric motility. CT scan at the outside hospital demonstrated "7.7 cm ill-defined pancreatic mass encases in size since the prior exam and now causing narrowing of the fist portion of duodenum and gastric outlet obstruction. Intra and extrahepatic biliary ductal dilation likely due to obstruction. Thrombosis of the splenic vein. lack of visualization of portal confluence may represent thrombosis versus compression. Moderate ascites increased since the prior exam." Advanced GI consulted for above.     CT scan in ED with New intrahepatic biliary duct dilatation suggesting extrahepatic biliary obstruction by tumor. Marked gastric distention may reflect gastric outlet obstruction by tumor. Increasing ascites. No small bowel obstruction.    Pt seen and examined at bedside. Son present for interview / assessment and completed with mandarin  (Lance [id number]). PT bradycardic on assessment, HR 52, other vitals stable. Pt on 2L O2 via nasal cannula. Pt states he has had abdominal / belly pain for sometime which has now increased. He denies N/V/D, fever, chills, shortness of breath, recent illness at home. PT is on eliquis and ASA. Last dose of both was 12/31/24 in AM per patient. PT also states he has been able to tolerate PO diet as home, last meal was this AM before arriving to ED. Pt states he has had EGD before approximately 2 years ago not at [facility]. PT denies alcohol, smoking, elicit drug use.     PT seen in NAD, alert / oriented x4. abdomen soft, nontender, nondistended. PT thin / ill appearing.     Allergies:  No Known Allergies    PMHX/PSHX:  Hypertension    CAD (coronary artery disease)    Stented coronary artery    Stented coronary artery        Family history:      Social History: as above    Home Medications:    Hospital Medications:  digoxin  Injectable 125 MICROGram(s) IV Push daily  enoxaparin Injectable 40 milliGRAM(s) SubCutaneous every 24 hours  lactated ringers 1000 milliLiter(s) IV Continuous <Continuous>  lactated ringers. 1000 milliLiter(s) IV Continuous <Continuous>  metoprolol tartrate Injectable 5 milliGRAM(s) IV Push every 6 hours        Pertinent ROS as per HPI      Vital Signs:  Vital Signs Last 24 Hrs  T(C): 36.7 (31 Dec 2024 09:17), Max: 36.7 (31 Dec 2024 06:23)  T(F): 98.1 (31 Dec 2024 09:17), Max: 98.1 (31 Dec 2024 09:17)  HR: 58 (31 Dec 2024 12:35) (58 - 97)  BP: 115/81 (31 Dec 2024 12:35) (113/70 - 133/83)  BP(mean): --  RR: 12 (31 Dec 2024 12:35) (12 - 18)  SpO2: 99% (31 Dec 2024 12:35) (97% - 99%)    Parameters below as of 31 Dec 2024 12:35  Patient On (Oxygen Delivery Method): nasal cannula, 2L      Daily Height in cm: 174.9 (31 Dec 2024 06:23)    Daily     LABS:                        8.9    2.49  )-----------( 110      ( 31 Dec 2024 12:19 )             27.6     Mean Cell Volume: 95.8 fL (12-31-24 @ 12:19)    12-31    135  |  99  |  15  ----------------------------<  108[H]  3.8   |  25  |  0.67    Ca    8.7      31 Dec 2024 12:19  Phos  2.8     12-31  Mg     2.20     12-31    TPro  6.2  /  Alb  3.4  /  TBili  0.6  /  DBili  x   /  AST  42[H]  /  ALT  33  /  AlkPhos  526[H]  12-31    LIVER FUNCTIONS - ( 31 Dec 2024 12:19 )  Alb: 3.4 g/dL / Pro: 6.2 g/dL / ALK PHOS: 526 U/L / ALT: 33 U/L / AST: 42 U/L / GGT: x           PT/INR - ( 31 Dec 2024 12:19 )   PT: 28.8 sec;   INR: 2.44 ratio         PTT - ( 31 Dec 2024 12:19 )  PTT:32.5 sec  Urinalysis Basic - ( 31 Dec 2024 12:19 )    Color: x / Appearance: x / SG: x / pH: x  Gluc: 108 mg/dL / Ketone: x  / Bili: x / Urobili: x   Blood: x / Protein: x / Nitrite: x   Leuk Esterase: x / RBC: x / WBC x   Sq Epi: x / Non Sq Epi: x / Bacteria: x                              8.9    2.49  )-----------( 110      ( 31 Dec 2024 12:19 )             27.6                         9.2    2.92  )-----------( 118      ( 31 Dec 2024 09:03 )             27.3       PHYSICAL EXAM:   GENERAL:  Lying in bed in NAD  HEENT:  Normocephalic/atraumatic, no scleral icterus  NECK: Trachea midline  CHEST:  Resp even, non labored   ABDOMEN:  Soft, non-tender, non-distended  EXTREMITIES: WWP, no edema  SKIN:  No rash or jaundice  NEURO:  Alert and oriented x 3, no asterixis    Imaging:    < from: CT Abdomen and Pelvis w/ IV Cont (12.31.24 @ 11:59) >  PROCEDURE:  CT of the Abdomen and Pelvis was performed.  Sagittal and coronal reformats were performed.    FINDINGS:  LOWER CHEST: Within normal limits.    LIVER: Calcified granulomas right lobe.  BILE DUCTS: Moderate intrahepatic biliary duct dilatation, new since   prior exam. Mural thickening and enhancement of the common bile duct.  GALLBLADDER: Within normal limits.  SPLEEN: Within normal limits.  PANCREAS: Redemonstration of locally invasive mass arising from the   pancreatic body. Local extension with vascular encasement/abutment of the   celiac axis, SMA, portal confluence and splenic vein.  ADRENALS: Within normal limits.  KIDNEYS/URETERS: 4 cm left renal cyst. No hydronephrosis.    BLADDER: Distended. Mural thickening.  REPRODUCTIVE ORGANS: Prostate is enlarged. Small left hydrocele.    BOWEL: Marked gastric distention. Mural thickening of the duodenal bulb   and second portion duodenum. No small bowel obstruction. Appendix not   visualized.  PERITONEUM/RETROPERITONEUM: Moderate ascites.  VESSELS: Atheromatous calcifications. Local vascular involvement with   tumor in the retroperitoneum as previously described, including segmental   obstruction of the retropancreatic splenic vein.  LYMPH NODES: No lymphadenopathy.  ABDOMINAL WALL: Within normal limits.  BONES: Within normal limits.    IMPRESSION:  New intrahepatic biliary duct dilatation suggesting extrahepatic biliary   obstruction by tumor.    Marked gastric distention may reflect gastric outlet obstruction by tumor.    Increasing ascites.    No small bowel obstruction.        --- End of Report ---      < end of copied text >

## 2025-01-02 NOTE — PROGRESS NOTE ADULT - ASSESSMENT
Pre-Operative Cardiac Risk Stratification and Optimization   Based on patient history and physical exam, the patient is considered to have elevated risk   Echo shows normal LV / RV function   Has no active CV symptoms  exercise capacity is greater than 4 METs.   can proceed to planned EGD and surgery     PAF  in sinus  cont current meds  resume a/c once deemed safe from surgery   normal Digoxin level     CAD  s/p Stents   cont BB  add statin   resume asa      Advanced care planning was discussed with patient and family.  Risks, benefits and alternatives of the cardiac treatments and medical therapy including procedures were discussed in detail and all questions were answered. Importance of compliance with medical therapy and lifestyle modification to improve cardiovascular health were addressed. Appropriate forms and patient educational materials were reviewed. 30 minutes face to face spent.

## 2025-01-02 NOTE — PROGRESS NOTE ADULT - SUBJECTIVE AND OBJECTIVE BOX
Subjective: Patient seen and examined. No new events except as noted.     SUBJECTIVE/ROS:  nad      MEDICATIONS:  MEDICATIONS  (STANDING):  dextrose 5% + sodium chloride 0.45% with potassium chloride 20 mEq/L 1000 milliLiter(s) (90 mL/Hr) IV Continuous <Continuous>  dextrose 5%. 1000 milliLiter(s) (50 mL/Hr) IV Continuous <Continuous>  dextrose 5%. 1000 milliLiter(s) (100 mL/Hr) IV Continuous <Continuous>  dextrose 50% Injectable 25 Gram(s) IV Push once  dextrose 50% Injectable 12.5 Gram(s) IV Push once  dextrose 50% Injectable 25 Gram(s) IV Push once  digoxin  Injectable 125 MICROGram(s) IV Push daily  glucagon  Injectable 1 milliGRAM(s) IntraMuscular once  influenza  Vaccine (HIGH DOSE) 0.5 milliLiter(s) IntraMuscular once  insulin lispro (ADMELOG) corrective regimen sliding scale   SubCutaneous every 6 hours  metoprolol tartrate Injectable 5 milliGRAM(s) IV Push every 6 hours  sodium phosphate 15 milliMole(s)/250 mL IVPB 15 milliMole(s) IV Intermittent once      PHYSICAL EXAM:  T(C): 36.8 (01-02-25 @ 05:05), Max: 37 (01-01-25 @ 14:26)  HR: 54 (01-02-25 @ 05:05) (50 - 70)  BP: 147/86 (01-02-25 @ 05:05) (120/70 - 148/79)  RR: 17 (01-02-25 @ 05:05) (17 - 18)  SpO2: 98% (01-02-25 @ 05:05) (95% - 98%)  Wt(kg): --  I&O's Summary    01 Jan 2025 07:01  -  02 Jan 2025 07:00  --------------------------------------------------------  IN: 2080 mL / OUT: 1900 mL / NET: 180 mL            JVP: Normal  Neck: supple  Lung: clear   CV: S1 S2 , Murmur:  Abd: soft  Ext: No edema  neuro: Awake / alert  Psych: flat affect  Skin: normal``    LABS/DATA:    CARDIAC MARKERS:                                10.2   4.26  )-----------( 131      ( 02 Jan 2025 03:51 )             29.8     01-02    135  |  102  |  14  ----------------------------<  84  4.2   |  24  |  0.63    Ca    8.9      02 Jan 2025 03:51  Phos  2.0     01-02  Mg     2.10     01-02    TPro  6.2  /  Alb  3.2[L]  /  TBili  0.9  /  DBili  0.3  /  AST  27  /  ALT  22  /  AlkPhos  448[H]  01-02    proBNP:   Lipid Profile:   HgA1c:   TSH:     TELE:  EKG:

## 2025-01-03 ENCOUNTER — TRANSCRIPTION ENCOUNTER (OUTPATIENT)
Age: 70
End: 2025-01-03

## 2025-01-03 ENCOUNTER — APPOINTMENT (OUTPATIENT)
Dept: SURGICAL ONCOLOGY | Facility: HOSPITAL | Age: 70
End: 2025-01-03

## 2025-01-03 VITALS
RESPIRATION RATE: 17 BRPM | TEMPERATURE: 98 F | DIASTOLIC BLOOD PRESSURE: 84 MMHG | OXYGEN SATURATION: 99 % | HEART RATE: 51 BPM | SYSTOLIC BLOOD PRESSURE: 120 MMHG

## 2025-01-03 LAB
ALBUMIN SERPL ELPH-MCNC: 3.3 G/DL — SIGNIFICANT CHANGE UP (ref 3.3–5)
ALP SERPL-CCNC: 413 U/L — HIGH (ref 40–120)
ALT FLD-CCNC: 22 U/L — SIGNIFICANT CHANGE UP (ref 4–41)
ANION GAP SERPL CALC-SCNC: 9 MMOL/L — SIGNIFICANT CHANGE UP (ref 7–14)
APTT BLD: 33.3 SEC — SIGNIFICANT CHANGE UP (ref 24.5–35.6)
AST SERPL-CCNC: 30 U/L — SIGNIFICANT CHANGE UP (ref 4–40)
BILIRUB DIRECT SERPL-MCNC: 0.3 MG/DL — SIGNIFICANT CHANGE UP (ref 0–0.3)
BILIRUB INDIRECT FLD-MCNC: 0.3 MG/DL — SIGNIFICANT CHANGE UP (ref 0–1)
BILIRUB SERPL-MCNC: 0.6 MG/DL — SIGNIFICANT CHANGE UP (ref 0.2–1.2)
BLD GP AB SCN SERPL QL: NEGATIVE — SIGNIFICANT CHANGE UP
BUN SERPL-MCNC: 9 MG/DL — SIGNIFICANT CHANGE UP (ref 7–23)
CALCIUM SERPL-MCNC: 8.9 MG/DL — SIGNIFICANT CHANGE UP (ref 8.4–10.5)
CHLORIDE SERPL-SCNC: 103 MMOL/L — SIGNIFICANT CHANGE UP (ref 98–107)
CO2 SERPL-SCNC: 23 MMOL/L — SIGNIFICANT CHANGE UP (ref 22–31)
CREAT SERPL-MCNC: 0.58 MG/DL — SIGNIFICANT CHANGE UP (ref 0.5–1.3)
EGFR: 104 ML/MIN/1.73M2 — SIGNIFICANT CHANGE UP
GLUCOSE BLDC GLUCOMTR-MCNC: 132 MG/DL — HIGH (ref 70–99)
GLUCOSE BLDC GLUCOMTR-MCNC: 136 MG/DL — HIGH (ref 70–99)
GLUCOSE BLDC GLUCOMTR-MCNC: 195 MG/DL — HIGH (ref 70–99)
GLUCOSE SERPL-MCNC: 204 MG/DL — HIGH (ref 70–99)
HCT VFR BLD CALC: 30.3 % — LOW (ref 39–50)
HGB BLD-MCNC: 10.3 G/DL — LOW (ref 13–17)
INR BLD: 1.43 RATIO — HIGH (ref 0.85–1.16)
MAGNESIUM SERPL-MCNC: 2.2 MG/DL — SIGNIFICANT CHANGE UP (ref 1.6–2.6)
MCHC RBC-ENTMCNC: 31.4 PG — SIGNIFICANT CHANGE UP (ref 27–34)
MCHC RBC-ENTMCNC: 34 G/DL — SIGNIFICANT CHANGE UP (ref 32–36)
MCV RBC AUTO: 92.4 FL — SIGNIFICANT CHANGE UP (ref 80–100)
NRBC # BLD: 0 /100 WBCS — SIGNIFICANT CHANGE UP (ref 0–0)
NRBC # FLD: 0 K/UL — SIGNIFICANT CHANGE UP (ref 0–0)
PHOSPHATE SERPL-MCNC: 2.6 MG/DL — SIGNIFICANT CHANGE UP (ref 2.5–4.5)
PLATELET # BLD AUTO: 115 K/UL — LOW (ref 150–400)
POTASSIUM SERPL-MCNC: 4.5 MMOL/L — SIGNIFICANT CHANGE UP (ref 3.5–5.3)
POTASSIUM SERPL-SCNC: 4.5 MMOL/L — SIGNIFICANT CHANGE UP (ref 3.5–5.3)
PROT SERPL-MCNC: 6.2 G/DL — SIGNIFICANT CHANGE UP (ref 6–8.3)
PROTHROM AB SERPL-ACNC: 16.5 SEC — HIGH (ref 9.9–13.4)
RBC # BLD: 3.28 M/UL — LOW (ref 4.2–5.8)
RBC # FLD: 12.3 % — SIGNIFICANT CHANGE UP (ref 10.3–14.5)
RH IG SCN BLD-IMP: POSITIVE — SIGNIFICANT CHANGE UP
SODIUM SERPL-SCNC: 135 MMOL/L — SIGNIFICANT CHANGE UP (ref 135–145)
WBC # BLD: 4.57 K/UL — SIGNIFICANT CHANGE UP (ref 3.8–10.5)
WBC # FLD AUTO: 4.57 K/UL — SIGNIFICANT CHANGE UP (ref 3.8–10.5)

## 2025-01-03 PROCEDURE — 99232 SBSQ HOSP IP/OBS MODERATE 35: CPT

## 2025-01-03 RX ORDER — SOD PHOS DI, MONO/K PHOS MONO 250 MG
1 TABLET ORAL ONCE
Refills: 0 | Status: DISCONTINUED | OUTPATIENT
Start: 2025-01-03 | End: 2025-01-03

## 2025-01-03 RX ORDER — OXYCODONE HCL 15 MG
10 TABLET ORAL EVERY 4 HOURS
Refills: 0 | Status: DISCONTINUED | OUTPATIENT
Start: 2025-01-03 | End: 2025-01-03

## 2025-01-03 RX ORDER — ACETAMINOPHEN 80 MG/.8ML
1000 SOLUTION/ DROPS ORAL EVERY 8 HOURS
Refills: 0 | Status: DISCONTINUED | OUTPATIENT
Start: 2025-01-03 | End: 2025-01-03

## 2025-01-03 RX ORDER — GABAPENTIN 300 MG/1
1 CAPSULE ORAL
Qty: 0 | Refills: 0 | DISCHARGE
Start: 2025-01-03

## 2025-01-03 RX ORDER — DIGOXIN 250 MCG
125 TABLET ORAL DAILY
Refills: 0 | Status: DISCONTINUED | OUTPATIENT
Start: 2025-01-03 | End: 2025-01-03

## 2025-01-03 RX ORDER — ACETAMINOPHEN 80 MG/.8ML
31.25 SOLUTION/ DROPS ORAL
Qty: 0 | Refills: 0 | DISCHARGE
Start: 2025-01-03

## 2025-01-03 RX ORDER — SODIUM CHLORIDE 9 MG/ML
1000 INJECTION, SOLUTION INTRAVENOUS
Refills: 0 | Status: DISCONTINUED | OUTPATIENT
Start: 2025-01-03 | End: 2025-01-03

## 2025-01-03 RX ORDER — OXYCODONE HCL 15 MG
5 TABLET ORAL EVERY 4 HOURS
Refills: 0 | Status: DISCONTINUED | OUTPATIENT
Start: 2025-01-03 | End: 2025-01-03

## 2025-01-03 RX ADMIN — GABAPENTIN 400 MILLIGRAM(S): 300 CAPSULE ORAL at 14:03

## 2025-01-03 RX ADMIN — METHADONE HYDROCHLORIDE 10 MILLIGRAM(S): 10 TABLET ORAL at 05:19

## 2025-01-03 RX ADMIN — Medication 1: at 05:19

## 2025-01-03 RX ADMIN — GABAPENTIN 400 MILLIGRAM(S): 300 CAPSULE ORAL at 05:19

## 2025-01-03 RX ADMIN — Medication 5 MILLIGRAM(S): at 13:02

## 2025-01-03 RX ADMIN — ACETAMINOPHEN 1000 MILLIGRAM(S): 80 SOLUTION/ DROPS ORAL at 14:03

## 2025-01-03 RX ADMIN — Medication 5 MILLIGRAM(S): at 10:35

## 2025-01-03 RX ADMIN — METHADONE HYDROCHLORIDE 10 MILLIGRAM(S): 10 TABLET ORAL at 14:03

## 2025-01-03 NOTE — PROGRESS NOTE ADULT - SUBJECTIVE AND OBJECTIVE BOX
Chief Complaint:  Patient is a 69y old  Male who presents with a chief complaint of abdominal pain; h/o GOO; locally advanced pancreatic cancer. (03 Jan 2025 11:09)    Interval Events:  s/p EGD yesterday  vitals stable    Allergies:  No Known Allergies        Hospital Medications:  acetaminophen   Oral Liquid .. 1000 milliGRAM(s) Oral every 8 hours  amLODIPine   Tablet 5 milliGRAM(s) Oral daily  dextrose 5%. 1000 milliLiter(s) IV Continuous <Continuous>  dextrose 5%. 1000 milliLiter(s) IV Continuous <Continuous>  dextrose 50% Injectable 25 Gram(s) IV Push once  dextrose 50% Injectable 12.5 Gram(s) IV Push once  dextrose 50% Injectable 25 Gram(s) IV Push once  dextrose Oral Gel 15 Gram(s) Oral once PRN  digoxin     Tablet 125 MICROGram(s) Oral daily  gabapentin 400 milliGRAM(s) Oral every 8 hours  glucagon  Injectable 1 milliGRAM(s) IntraMuscular once  influenza  Vaccine (HIGH DOSE) 0.5 milliLiter(s) IntraMuscular once  insulin lispro (ADMELOG) corrective regimen sliding scale   SubCutaneous every 6 hours  methadone    Tablet 10 milliGRAM(s) Oral every 8 hours  oxyCODONE    Solution 5 milliGRAM(s) Oral every 4 hours PRN  oxyCODONE    Solution 10 milliGRAM(s) Oral every 4 hours PRN  potassium phosphate / sodium phosphate Powder (PHOS-NaK) 1 Packet(s) Oral once      PMHX/PSHX:  Hypertension    CAD (coronary artery disease)    Stented coronary artery    Stented coronary artery        Family history:      PHYSICAL EXAM:   Vital Signs:  Vital Signs Last 24 Hrs  T(C): 36.8 (03 Jan 2025 08:30), Max: 37.1 (02 Jan 2025 12:09)  T(F): 98.2 (03 Jan 2025 08:30), Max: 98.7 (02 Jan 2025 12:09)  HR: 50 (03 Jan 2025 10:30) (46 - 59)  BP: 139/80 (03 Jan 2025 10:30) (122/69 - 154/62)  BP(mean): --  RR: 16 (03 Jan 2025 08:30) (12 - 18)  SpO2: 98% (03 Jan 2025 08:30) (94% - 100%)    Parameters below as of 03 Jan 2025 08:30  Patient On (Oxygen Delivery Method): room air      Daily     Daily     GENERAL:  No acute distress  HEENT:  no scleral icterus  CHEST:  no accessory muscle use  HEART:  Regular rate and rhythm  ABDOMEN:  Soft, non-tender, non-distended  EXTREMITIES:  No edema  SKIN:  No rash/ecchymoses  NEURO:  Alert and oriented x 3    LABS:                        10.3   4.57  )-----------( 115      ( 03 Jan 2025 05:41 )             30.3     Mean Cell Volume: 92.4 fL (01-03-25 @ 05:41)    01-03    135  |  103  |  9   ----------------------------<  204[H]  4.5   |  23  |  0.58    Ca    8.9      03 Jan 2025 05:41  Phos  2.6     01-03  Mg     2.20     01-03    TPro  6.2  /  Alb  3.3  /  TBili  0.6  /  DBili  0.3  /  AST  30  /  ALT  22  /  AlkPhos  413[H]  01-03    LIVER FUNCTIONS - ( 03 Jan 2025 05:41 )  Alb: 3.3 g/dL / Pro: 6.2 g/dL / ALK PHOS: 413 U/L / ALT: 22 U/L / AST: 30 U/L / GGT: x           PT/INR - ( 03 Jan 2025 05:41 )   PT: 16.5 sec;   INR: 1.43 ratio         PTT - ( 03 Jan 2025 05:41 )  PTT:33.3 sec  Urinalysis Basic - ( 03 Jan 2025 05:41 )    Color: x / Appearance: x / SG: x / pH: x  Gluc: 204 mg/dL / Ketone: x  / Bili: x / Urobili: x   Blood: x / Protein: x / Nitrite: x   Leuk Esterase: x / RBC: x / WBC x   Sq Epi: x / Non Sq Epi: x / Bacteria: x                              10.3   4.57  )-----------( 115      ( 03 Jan 2025 05:41 )             30.3                         10.2   4.26  )-----------( 131      ( 02 Jan 2025 03:51 )             29.8                         9.6    3.33  )-----------( 124      ( 01 Jan 2025 05:22 )             28.3                         8.9    2.49  )-----------( 110      ( 31 Dec 2024 12:19 )             27.6       < from: ERCP (01.02.25 @ 12:10) >  Findings:       An NG and NJ tubes were visible on the  film. NG tube was removed.        The esophagus was successfully intubated under direct vision. The scope        was advanced to a normal major papilla in the descending duodenum        without detailed examination of the pharynx, larynx and associated        structures, and upper GI tract. The upper GI tract was grossly normal.        Using a long wire over ballooncatheter antrum was cannulated along the        NJ tube. The guidewire was advanced to D3. Using abdominal pressure and        over the guidewire the duodenoscope was advanced to D2, beyond the mass        causing gastric outlet obstruction, where normal major ampulla was        located. The bile duct was deeply cannulated with the short-nosed        traction sphincterotome. Contrast was injected. I personally interpreted        the bile duct images. Ductal flow of contrast was adequate. Image       quality was excellent. Contrast extended to the entire biliary tree. The        main bile duct was normal. There was a high rising cystic duct A 10 mm        biliary sphincterotomy was made with a monofilament traction (standard)        sphincterotome using ERBE electrocautery. There was no        post-sphincterotomy bleeding. To discover objects, the biliary tree was        swept with a 12 mm balloon starting at the bifurcation. Nothing was        found. One 8 mm by 6 cm transpapillary covered metal stent with no        external flaps and no internal flaps was placed into the common bile        duct bellow the cystic duct. Bile flowed through the stent. The stent        was in good position. The scope was withdrawn and replaced with the        therapeutic endoscope in order to accomplish the maneuver. Normal        esophagus, gastric mucosa was seen. Using a long wire over balloon        catheter antrum was again cannulated along the NJ tube. The guidewire        was advanced to the jejunum. Using balloon dilation and withdrawal the        duodenal infiltration was measured to be 2 cm resulting in angulation of        antrum and D1/D2. The guidewire was kept in jejunum and NJ tube was        removed. The malignant stricture was stented with a 22 mm x 9 cm        Evolution controlled-release uncovered stent under fluoroscopic        guidance. The stent was fixated in the antrum using 2 [employer]        resolution 360 stents. Gastroscope was removed from the patient.                                                                                 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             - ERC with sphincterotomy, and placement of protective                        8mm x 6 cm FC-SEMS.                       - The duodenal stricture was stented with 22 mm x 9 cm                        UC-SEMS. This stent was fixated to antrumwith clips.    < end of copied text >

## 2025-01-03 NOTE — DISCHARGE NOTE PROVIDER - CARE PROVIDERS DIRECT ADDRESSES
[email or web address]

## 2025-01-03 NOTE — PROGRESS NOTE ADULT - SUBJECTIVE AND OBJECTIVE BOX
{\rtf1\fdemng46841\ansi\irsrulb5717\ftnbj\uc1\deff0  {\fonttbl{\f0 \fnil Segoe UI;}{\f1 \fnil \fcharset0 Segoe UI;}{\f2 \fnil Times New Lj;}}  {\colortbl ;\nym936\evpel294\mvcl393 ;\red0\green0\blue0 ;\red0\green0\zabu347 ;\red0\green0\blue0 ;}  {\stylesheet{\f0\fs20 Normal;}{\cs1 Default Paragraph Font;}{\cs2\f0\fs16 Line Number;}{\cs3\f2\fs24\ul\cf3 Hyperlink;}}  {\*\revtbl{Unknown;}}  \xzyxud18339\ddggip67811\dnzsf5658\qrrco6634\fawfy6834\ekjtq0699\jgxjasi211\czsnjpo663\nogrowautofit\czvhjl654\formshade\nofeaturethrottle1\dntblnsbdb\fet4\aendnotes\aftnnrlc\pgbrdrhead\pgbrdrfoot  \sectd\hlones17237\xlblpx02117\guttersxn0\pnpxwzdg7482\ojzpwthd6468\nbmbxxal6353\bagjqflf2987\bwzlchw623\trdlxtl094\sbkpage\pgncont\pgndec  \plain\plain\f0\fs24\ql\plain\f0\fs24\plain\f1\fs16\mviz1043\hich\f1\dbch\f1\loch\f1\cf2\fs16 Morning Surgical Progress Note\par  Patient is a 69y old  Male who presents with a chief complaint of abdominal pain; h/o GOO; locally advanced pancreatic cancer. (01 Jan 2025 15:17)\par  \par  SUBJECTIVE: Patient seen and examined at bedside with surgical team. Patient reports feeling well. Denies any pain. Patient is not currently having any N/V and was able to tolerate some liquids post stenting procedure by GI\par  \par  Vital Signs Last 24 Hrs\par  \par  \pard\plain\f0\fs24\plain\f1\fs16\aiib7386\hich\f1\dbch\f1\loch\f1\cf2\fs16 T(C): 36.4 (01-03-25 @ 05:11), Max: 37.1 (01-02-25 @ 12:09)\par  HR: 58 (01-03-25 @ 05:11) (46 - 59)\par  BP: 128/70 (01-03-25 @ 05:11) (122/69 - 154/62)\par  BP(mean): --\par  ABP: --\par  ABP(mean): --\par  RR: 18 (01-03-25 @ 05:11) (12 - 18)\par  SpO2: 99% (01-03-25 @ 05:11) (94% - 100%)\par  Wt(kg): --\par  CVP(mm Hg): --\par  CI: --\par  CAPILLARY BLOOD GLUCOSE\par  \par  \par  POCT Blood Glucose.: 195 mg/dL (03 Jan 2025 05:09)\par  POCT Blood Glucose.: 180 mg/dL (02 Jan 2025 23:28)\par  POCT Blood Glucose.: 163 mg/dL (02 Jan 2025 16:53)\par  POCT Blood Glucose.: 98 mg/dL (02 Jan 2025 12:26)\par  POCT Blood Glucose.: 106 mg/dL (02 Jan 2025 11:20)\par   N/A\par  \par  \par  01-02 @ 07:01  -  01-03 @ 07:00\par  --------------------------------------------------------\par  IN:\par    dextrose 5% + sodium chloride 0.45% w/ Additives: 2070 mL\par    Oral Fluid: 200 mL\par  Total IN: 2270 mL\par  \par  OUT:\par    Nasogastric/Oral tube (mL): 200 mL\par    Voided (mL): 1400 mL\par  Total OUT: 1600 mL\par  \par  Total NET: 670 mL\par  \par  \ql\plain\f0\fs24\plain\f1\fs16\hqmc5915\hich\f1\dbch\f1\loch\f1\cf2\fs16\par  Medications\par  \par  \pard\plain\f0\fs24\plain\f1\fs16\csdv8618\hich\f1\dbch\f1\loch\f1\cf2\fs16 MEDICATIONS  (STANDING):\par  dextrose 5%. 1000 milliLiter(s) (50 mL/Hr) IV Continuous <Continuous>\par  dextrose 5%. 1000 milliLiter(s) (100 mL/Hr) IV Continuous <Continuous>\par  dextrose 50% Injectable 25 Gram(s) IV Push once\par  dextrose 50% Injectable 12.5 Gram(s) IV Push once\par  dextrose 50% Injectable 25 Gram(s) IV Push once\par  digoxin  Injectable 125 MICROGram(s) IV Push daily\par  gabapentin 400 milliGRAM(s) Oral every 8 hours\par  glucagon  Injectable 1 milliGRAM(s) IntraMuscular once\par  influenza  Vaccine (HIGH DOSE) 0.5 milliLiter(s) IntraMuscular once\par  insulin lispro (ADMELOG) corrective regimen sliding scale   SubCutaneous every 6 hours\par  methadone    Tablet 10 milliGRAM(s) Oral every 8 hours\par  metoprolol tartrate Injectable 5 milliGRAM(s) IV Push every 6 hours\par  ondansetron Injectable 4 milliGRAM(s) IV Push once\par  potassium phosphate / sodium phosphate Powder (PHOS-NaK) 1 Packet(s) Oral once\par  \par  MEDICATIONS  (PRN):\par  acetaminophen   IVPB .. 1000 milliGRAM(s) IV Intermittent every 6 hours PRN Mild Pain (1 - 3), Moderate Pain (4 - 6), Severe Pain (7 - 10)\par  dextrose Oral Gel 15 Gram(s) Oral once PRN Blood Glucose LESS THAN 70 milliGRAM(s)/deciliter\par  HYDROmorphone  Injectable 0.2 milliGRAM(s) IV Push every 4 hours PRN Moderate Pain (4 - 6)\par  HYDROmorphone  Injectable 0.5 milliGRAM(s) IV Push every 4 hours PRN Severe Pain (7 - 10)\par  \ql\plain\f0\fs24\plain\f1\fs16\dywi3244\hich\f1\dbch\f1\loch\f1\cf2\fs16\par  \par  \par  Physical Exam\par  General: NAD, resting comfortably in bed\par  Pulmonary: Nonlabored breathing, no respiratory distress\par  Cardiovascular: NSR/Sinus Bradycardia on monitor\par  Abdominal: soft, NT/ND\par  Extremities: WWP\par  \par  LABS:\par           \par  \pard\plain\f0\fs24\plain\f1\fs16\fcnn6731\hich\f1\dbch\f1\loch\f1\cf2\fs16   CBC (01-03 @ 05:41)\par                              10.3[L]            \par             4.57    )----------------(  115[L]     --    % Neutrophils, --    % Lymphocytes, ANC: --    \par                              30.3[L]\par  CBC (01-02 @ 03:51)\par                              10.2[L]            \par             4.26    )----------------(  131[L]     --    % Neutrophils, --    % Lymphocytes, ANC: --    \par                              29.8[L]\par  \par  BMP (01-03 @ 05:41)\par             135     |  103     |  9     \tab\tab Ca++ --      Ca 8.9   \par             ---------------------------------( 204[H]\tab\tab Mg 2.20  \par             4.5     |  23      |  0.58  \tab\tab\tab Ph 2.6   \par  BMP (01-02 @ 03:51)\par             135     |  102     |  14    \tab\tab Ca++ --      Ca 8.9   \par             ---------------------------------( 84    \tab\tab Mg 2.10  \par             4.2     |  24      |  0.63  \tab\tab\tab Ph 2.0[L]\par  \par  LFTs (01-03 @ 05:41)      TPro 6.2 / Alb 3.3 / TBili 0.6 / DBili 0.3 / AST 30 / ALT 22 / AlkPhos 413[H]\par  LFTs (01-02 @ 03:51)      TPro 6.2 / Alb 3.2[L] / TBili 0.9 / DBili 0.3 / AST 27 / ALT 22 / AlkPhos 448[H]\par  \par  Coags (01-03 @ 05:41)  aPTT 33.3 / INR 1.43[H] / PT 16.5[H]\par  Coags (01-02 @ 03:51)  aPTT 31.5 / INR 1.33[H] / PT 15.8[H]\par  \par  Urinalysis (01-03 @ 05:41):     Color:  / Appearance:  / SG:  / pH:  / Gluc: 204[H] / Ketones:  / Bili:  / Urobili:  / Protein : / Nitrites:  / Leuk.Est:  / RBC:  / WBC:  / Sq Epi:  / Non Sq Epi:  / Bacteria    \par  \ql\plain\f0\fs24\plain\f1\fs16\hymu1963\hich\f1\dbch\f1\loch\f1\cf2\fs16\par  \plain\f1\fs16\ihhp3254\hich\f1\dbch\f1\loch\f1\cf2\fs16\strike\plain\f1\fs16\vngu5741\hich\f1\dbch\f1\loch\f1\cf2\fs16\plain\f1\fs16\kgjh8574\hich\f1\dbch\f1\loch\f1\cf2\fs16\par  {\*\bkmkstart [id number]}{\*\bkmkend [id number]}\plain\f1\fs16\qbrd6540\hich\f1\dbch\f1\loch\f1\cf2\fs16\b Assessment and Plan:\plain\f1\fs16\ugqb5859\hich\f1\dbch\f1\loch\f1\cf2\fs16  \par  \trowd\smcets07\lastrow\bweremz14\trpaddfl3\dnvulkh49\trpaddfr3\trpaddt0\trpaddft3\trpaddb0\trpaddfb3\trleft0  \clvertalt\yejajj79\clpadft3\pfzjtq48\clpadfr3\clpadl0\clpadfl3\clpadb0\clpadfb3\gogac4267  \clvertalt\vlbhaw82\clpadft3\zylnlu42\clpadfr3\clpadl0\clpadfl3\clpadb0\clpadfb3\igwje7504  \pard\intbl\ssparaaux0\s0\fi-120\li120\ql\plain\f0\fs24{\*\bkmkstart [id number]}{\*\bkmkend [id number]}\plain\f1\fs16\qptr7278\hich\f1\dbch\f1\loch\f1\cf2\fs16 \'b7 \plain\f1\fs16\rtfu6910\hich\f1\dbch\f1\loch\f1\cf2\fs16\b Assessment\plain\f1\fs16\etfl9755\hich\f1\dbch\f1\loch\f1\cf2\fs16\cell  \pard\intbl\ssparaaux0\s0\li300\ri300\ql\plain\f0\fs24\plain\f1\fs16\jjri9369\hich\f1\dbch\f1\loch\f1\cf2\fs16\cell  \intbl\row  \pard\ssparaaux0\s0\ql\plain\f0\fs24\plain\f1\fs16\qovp4883\hich\f1\dbch\f1\loch\f1\cf2\fs16 70 y/o M with PMHx pancreatic adenocarcinoma s/p radiation and palliative chemotherapy, DM, afib on Eliquis/Dig/Metoprolol, CAD (on ASA) and HLD presenting with   gastric outlet obstruction.  Pt was recently admitted and discharged on NYP-Q  for gastric outlet obstruction, mild pyloric extrinsic stenosis, and impaired gastric motility. CT scan at the outside hospital demonstrated 7.7 cm ill-defined pancreatic mass   that has increased in size and intra and extrahepatic biliary ductal dilation likely due to obstruction. Pt is now s/p EGD, and NG and NJ placement for decompression and TF with GI on 12/31. s/p duodenal stent with GI on 1/2\par  \par  Plan:\par  - CLD- advance to puree diet\par  - DVT ppx held for proc and initially elevated INR\par  - low dose ISS\par  - IV metop 5 q6h\par  - IV Dig 125mcg -  plan to switch to PO meds today\par  - OOB and ambulating as tolerated\par  - dc planning home today on pureed diet to restart eliquis tomorrow at home\par  \par  E Team Surgery \par  [id number].\plain\f0\fs20\ckqz1632\hich\f0\dbch\f0\loch\f0\fs20\par  }

## 2025-01-03 NOTE — DISCHARGE NOTE NURSING/CASE MANAGEMENT/SOCIAL WORK - PATIENT PORTAL LINK FT
You can access the FollowMyHealth Patient Portal offered by [facility] by registering at the following website: [email or web address]. By joining [facility]’s FollowMyHealth portal, you will also be able to view your health information using other applications (apps) compatible with our system.

## 2025-01-03 NOTE — DISCHARGE NOTE PROVIDER - NSDCFUADDAPPT_GEN_ALL_CORE_FT
Your home losartan was not continued during your hospitalization as your blood pressure was controlled without it. Please make an appointment with your cardiologist this week to discuss resuming your Losartan.

## 2025-01-03 NOTE — PROGRESS NOTE ADULT - REASON FOR ADMISSION
abdominal pain; h/o GOO; locally advanced pancreatic cancer.

## 2025-01-03 NOTE — DISCHARGE NOTE PROVIDER - NSDCFUADDINST_GEN_ALL_CORE_FT
PUREE DIET  WHAT YOU NEED TO KNOW:    What is a complete blenderized (puree) diet? A complete blenderized diet contains only foods that have been blended with liquids. You may need a blenderized diet if you cannot chew or swallow solid food. You may also need to thicken liquids. Your dietitian will tell you what consistency of liquids you may have.    How do I prepare the food? Use a  or . Foods may be easier to blend if you cut them into small pieces first. Mix equal amounts of solid food and liquid. Some examples of liquids you can use are milk, fruit juice, and vegetable juice. The liquids should not have chunks or pulp. For example, blend 1 cup of cooked pasta with 1 cup of vegetable juice. If you are blending fruits or vegetables, you can use less liquid. Pour the blended food through a strainer to remove chunks, seeds, and fiber. This will help prevent choking.    Which foods should I include?    Breads, cereals, rice, and pasta:  Breads or crackers without nuts    Cooked, enriched cereals such as grits    Cooked rice, pasta, or noodles    Soft fruits and vegetables:  Cooked or canned fruits and vegetables without skins or seeds      Meat and other protein sources:  Cooked, tender beef, chicken, pork, or turkey    Infant strained meats (baby food)    Boiled or poached eggs    Cooked legumes such as split peas or split lentils without the [place] (outer covering)    Desserts:  Smooth custard, pudding, or yogurt      Fats:  Butter, margarine, or oil    Cream cheese or cheese sauce    Salad dressing    Smooth sauces or gravy    Other liquids and foods: Ask your dietitian if you need to thicken the following:  Milk, juice, coffee, tea, soda, or nutrition supplements    Desserts such as shakes, ice cream, sherbet, or plain gelatin    Tomato paste or sauce    Blended, strained soup stock or cream soups

## 2025-01-03 NOTE — DISCHARGE NOTE PROVIDER - NSDCCPCAREPLAN_GEN_ALL_CORE_FT
PRINCIPAL DISCHARGE DIAGNOSIS  Diagnosis: Gastric outlet obstruction  Assessment and Plan of Treatment: You had an ERCP with placement of stents to relieve your gastric outlet obstruction.  ACTIVITY: No heavy lifting or straining. Otherwise, you may return to your usual level of physical activity. If you are taking narcotic pain medication DO NOT drive a car, operate machinery or make important decisions.  DIET: PUREE DIET ONLY  NOTIFY YOUR SURGEON IF YOU HAVE: any bleeding that does not stop, any pus draining from your wound(s), any fever (over 100.4 F) persistent nausea/vomiting, or if your pain is not controlled on your discharge pain medications, unable to urinate.  FOLLOW UP:  1. Please follow up with your primary care physician in one week regarding your hospitalization, bring copies of your discharge paperwork.  2. Please follow up with your surgeon, Dr. Alonso. Call [phone] to make an appointment.

## 2025-01-03 NOTE — PROGRESS NOTE ADULT - ASSESSMENT
PAF  in sinus  cont current meds  resume a/c once deemed safe from surgery   normal Digoxin level     CAD  s/p Stents   cont BB  add statin   resume asa      Advanced care planning was discussed with patient and family.  Risks, benefits and alternatives of the cardiac treatments and medical therapy including procedures were discussed in detail and all questions were answered. Importance of compliance with medical therapy and lifestyle modification to improve cardiovascular health were addressed. Appropriate forms and patient educational materials were reviewed. 30 minutes face to face spent.

## 2025-01-03 NOTE — PROGRESS NOTE ADULT - NUTRITIONAL ASSESSMENT
This patient has been assessed with a concern for Malnutrition and has been determined to have a diagnosis/diagnoses of Severe protein-calorie malnutrition and Underweight (BMI < 19).    This patient is being managed with:   Diet Clear Liquid-  Consistent Carbohydrate {Evening Snack} (CSTCHOSN)  Entered: Jan 2 2025  5:20PM

## 2025-01-03 NOTE — PROGRESS NOTE ADULT - SUBJECTIVE AND OBJECTIVE BOX
Patient is a 69y old  Male who presents with a chief complaint of abdominal pain; h/o GOO; locally advanced pancreatic cancer. (02 Jan 2025 07:23)   s/p EGD with sphincetoeromy and duodenal stent placement with GI 1/2      MEDICATIONS  (STANDING):  dextrose 5% + sodium chloride 0.45% with potassium chloride 20 mEq/L 1000 milliLiter(s) (90 mL/Hr) IV Continuous <Continuous>  dextrose 5%. 1000 milliLiter(s) (50 mL/Hr) IV Continuous <Continuous>  dextrose 5%. 1000 milliLiter(s) (100 mL/Hr) IV Continuous <Continuous>  dextrose 50% Injectable 25 Gram(s) IV Push once  dextrose 50% Injectable 12.5 Gram(s) IV Push once  dextrose 50% Injectable 25 Gram(s) IV Push once  digoxin  Injectable 125 MICROGram(s) IV Push daily  gabapentin 400 milliGRAM(s) Oral every 8 hours  glucagon  Injectable 1 milliGRAM(s) IntraMuscular once  influenza  Vaccine (HIGH DOSE) 0.5 milliLiter(s) IntraMuscular once  insulin lispro (ADMELOG) corrective regimen sliding scale   SubCutaneous every 6 hours  methadone    Tablet 10 milliGRAM(s) Oral every 8 hours  metoprolol tartrate Injectable 5 milliGRAM(s) IV Push every 6 hours  ondansetron Injectable 4 milliGRAM(s) IV Push once    MEDICATIONS  (PRN):  acetaminophen   IVPB .. 1000 milliGRAM(s) IV Intermittent every 6 hours PRN Mild Pain (1 - 3), Moderate Pain (4 - 6), Severe Pain (7 - 10)  dextrose Oral Gel 15 Gram(s) Oral once PRN Blood Glucose LESS THAN 70 milliGRAM(s)/deciliter  HYDROmorphone  Injectable 0.2 milliGRAM(s) IV Push every 4 hours PRN Moderate Pain (4 - 6)  HYDROmorphone  Injectable 0.5 milliGRAM(s) IV Push every 4 hours PRN Severe Pain (7 - 10)          Vital Signs Last 24 Hrs  T(C): 36.4 (03 Jan 2025 05:11), Max: 37.1 (02 Jan 2025 12:09)  T(F): 97.5 (03 Jan 2025 05:11), Max: 98.7 (02 Jan 2025 12:09)  HR: 58 (03 Jan 2025 05:11) (46 - 59)  BP: 128/70 (03 Jan 2025 05:11) (122/69 - 154/62)  BP(mean): --  RR: 18 (03 Jan 2025 05:11) (12 - 18)  SpO2: 99% (03 Jan 2025 05:11) (94% - 100%)    Parameters below as of 03 Jan 2025 05:11  Patient On (Oxygen Delivery Method): room air        PE  NAD  Awake, alert  Anicteric, MMM  RRR  CTAB  Abd soft, NT, ND  No c/c/e  No rash grossly                            10.2   4.26  )-----------( 131      ( 02 Jan 2025 03:51 )             29.8       01-03    135  |  103  |  x   ----------------------------<  x   4.5   |  x   |  x     Ca    8.9      02 Jan 2025 03:51  Phos  2.0     01-02  Mg     2.20     01-03    TPro  6.2  /  Alb  3.3  /  TBili  0.6  /  DBili  0.3  /  AST  30  /  ALT  22  /  AlkPhos  413[H]  01-03

## 2025-01-03 NOTE — DISCHARGE NOTE PROVIDER - HOSPITAL COURSE
This patient is a 70 y/o M with PMHx pancreatic adenocarcinoma s/p radiation and palliative chemotherapy, DM, afib ob Eliquis, CAD (on DAPT) and HLD who [facility] for a second opinion with Dr. Toro Alonso. Pt was recently admitted and discharged from [facility]  for gastric outlet obstruction, mild pyloric extrinsic stenosis, and impaired gastric motility. CT scan at the outside hospital demonstrated "7.7 cm ill-defined pancreatic mass encases in size since the prior exam and now causing narrowing of the fist portion of duodenum and gastric outlet obstruction. Intra and extrahepatic biliary ductal dilation likely due to obstruction. Thrombosis of the splenic vein. lack of visualization of portal confluence may represent thrombosis versus compression. Moderate ascites increased since the prior exam. On arrival patient afebrile with stable vitals signs. Exam notable for mild epigastric tenderness. Labs notable for elevated alkphos and elevated INR.   Patient admitted to surgical oncology under the care of Dr. Alonso for locally advanced unresectable pancreatic body adenocarcinoma and duodenal obstruction with early signs of biliary obstruction. Patient deemed higher risk for surgical bypass due to ascites and history of palliative radiation. Consulted advanced GI for EGD/stent placement.  12/31 Patient taken by Dr. Giang of GI for endoscopy with placement of NGT for gastric decompression and nasojejunal tube for feeding.  Both NJ and NG tube tied in place with bridal to prevent migration. Extrinsic compression in the gastric antrum and D1 causing gastric outlet obstruction seen. Plan for plan for EGD with duodenal stent +/- ERCP 1/2/25 when off anticoagulation and INR <1.8.  1/1 Patient started on trickle tube feeds via NJT. EGD/ERCP scheduled for following day. INR 1.7.  1/2 INR 1.33. Patient returned to GI and underwent ERCP with Dr. 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 NJ and NG tube removed.  Then had ERC with sphincterotomy, and placement of protective 8mmx 6 cm FC-SEMS. Also, the duodenal stricture was stented with 22 mm x 9 cm UC-SEMS. This stent was fixated to antrum with clips. Patient allowed clear liquid diet with plan to advance to soft/puree the following day and to resume anticoagulation the following day. Patient tolerated procedure well and post-procedure tolerated clear liquid diet.  1/3 Diet advanced to pureed which he tolerated well. IV meds converted to PO and home medications resumed. At this time, patient is currently ambulating, voiding, tolerating a pureed diet. Patient has been deemed stable for discharge home on pureed diet and with instruction to resume Eliquis 1/4. Patient instructed to follow up as an outpatient.

## 2025-01-03 NOTE — PROGRESS NOTE ADULT - ASSESSMENT
68 y/o M with PMHx pancreatic adenocarcinoma s/p radiation and palliative chemotherapy, DM, afib ob Eliquis, CAD (on DAPT) and HLD presenting for a second opinion with Dr. Toro Alonso. Pt was recently admitted and discharged on NYP-Q  for gastric outlet obstruction.     #pancreatic adenocarcinoma  #GOO  pt presented with abdominal pain after recent admission to OSH found to have GOO on imaging and at that hospital not found to be a candidate for surgical / endoscopic intervention. Pt with persistent nausea / vomiting at home prompted ED visit for second opinion with Dr. Alonso. PT found to have New intrahepatic biliary duct dilatation suggesting extrahepatic biliary obstruction by tumor and Marked gastric distention may reflect gastric outlet obstruction by tumor. Increasing ascites noted. S/P EGD/ERCP 1/2 with removal of NG/NJ tube; sa/p sphincterotomy, and placement of covered metal biliary stent and uncovered duodenal stent.    Recommendations:  - EGD with NG tube to suction and NJ tube for feeds placement today  - CLD, then advance to puree; do NOT advance beyond puree diet  - CBC / CMP daily  - rest of care per primary team  - will sign off at this time, however please call back with questions or should any worsening/persistent issues arise. Follow up in Gastroenterology Clinic: [phone]    Note and recommendations are incomplete until reviewed and attested by attending.    Waqas Schrader MD  GI/Hepatology Fellow, PGY5  [place] Pager [phone] or [facility] Pager [phone]  Teams preferred (7AM to 5PM); after 5PM, call GI fellow on call    On Weekends/Holidays (All Day) and Weekdays after 5PM to 8AM  For non-urgent consults, please email [email or web address] and [email or web address]  For urgent consults, please contact on call GI team. See Amion schedule ([facility]), Blip paging system ([facility]), or call hospital  ([facility])

## 2025-01-03 NOTE — DISCHARGE NOTE NURSING/CASE MANAGEMENT/SOCIAL WORK - NSFLUVACAGEDISCH_IMM_ALL_CORE
Insulin Degludec 200 UNIT/ML SOPN cost $700 for 1 month supply, wants something else called in instead of this insulin. [facility] did not have script for needles called in. Please send to [facility].
Samples received. Wife notified will come to office to .
There is nothing else  Call rep for samples
They will not have ins until Jan. Asking, for something else to be sent in.
Adult

## 2025-01-03 NOTE — DISCHARGE NOTE PROVIDER - PROVIDER TOKENS
PROVIDER:[TOKEN:[[id number]],FOLLOWUP:[2 weeks]],PROVIDER:[TOKEN:[[id number]],FOLLOWUP:[1 month]]

## 2025-01-03 NOTE — PROGRESS NOTE ADULT - NS ATTEND AMEND GEN_ALL_CORE FT
Agree with above assessment and plan.   69 year old male with unresectable stage III pancreatic adenocarcinoma s/p multiple lines of therapy including FOLFIRINOX, chemoRT with Xeloda, and more recently FOLFOX x2 cycles but stopped due to intolerance. Had a celiac plexus block prior as well. Palliative intent treatment, following with Dr Hernández at [facility], was awaiting further imaging for further assessment of future potential therapies. Now with GOO s/p biliary stent placement, s/p EGD, and NG and NJ placement for decompression and TF with GI on 12/31. Discussed with patient, was following at [facility] now planning to stay with [facility] Dr Hernández who they can follow up with outpatient after discharge for further discussion of oncologic care.

## 2025-01-03 NOTE — DISCHARGE NOTE PROVIDER - DETAILS OF MALNUTRITION DIAGNOSIS/DIAGNOSES
This patient has been assessed with a concern for Malnutrition and was treated during this hospitalization for the following Nutrition diagnosis/diagnoses:     -  01/01/2025: Severe protein-calorie malnutrition   -  01/01/2025: Underweight (BMI < 19)

## 2025-01-03 NOTE — PROGRESS NOTE ADULT - SUBJECTIVE AND OBJECTIVE BOX
ANESTHESIA POSTOP CHECK    69y Male POSTOP DAY 1 S/P EUS    Vital Signs Last 24 Hrs  T(C): 36.8 (03 Jan 2025 08:30), Max: 37.1 (02 Jan 2025 12:09)  T(F): 98.2 (03 Jan 2025 08:30), Max: 98.7 (02 Jan 2025 12:09)  HR: 50 (03 Jan 2025 10:30) (46 - 59)  BP: 139/80 (03 Jan 2025 10:30) (122/69 - 154/62)  BP(mean): --  RR: 16 (03 Jan 2025 08:30) (12 - 18)  SpO2: 98% (03 Jan 2025 08:30) (94% - 100%)    Parameters below as of 03 Jan 2025 08:30  Patient On (Oxygen Delivery Method): room air      I&O's Summary    02 Jan 2025 07:01  -  03 Jan 2025 07:00  --------------------------------------------------------  IN: 2270 mL / OUT: 1600 mL / NET: 670 mL    03 Jan 2025 07:01  -  03 Jan 2025 11:09  --------------------------------------------------------  IN: 300 mL / OUT: 200 mL / NET: 100 mL        [X] NO APPARENT ANESTHESIA COMPLICATIONS      Comments:

## 2025-01-03 NOTE — PROGRESS NOTE ADULT - ASSESSMENT
77 year old male with pancreatic adenocarcinoma admitted with gastric outlet obstruction    Pancreatic adenocarcinoma  -undergoing care with Dr Hernández of [facility]  -invasive pancreatic body carcinoma involving the splenic vein and nonspecific lung nodules. The primary mass was [id number]. Was started on Neoadjuvant chemo with mFOLFIRINOX in December 2022. Patient had local progression in January when he had severe abdominal pain with involvement of the splenic artery and bifucation of the celiac axis. Celiac nerve block in Feb. Started chemo rads with xeloda on 2/24/23. Finished on 3/29/23.   Restaging scan in June showed involvement of the celiac axis. Started on FOLFOX s/p 2 cycles stopped due to intolerance  -Follow up after discharge    Gastric outlet obstruction  -recently admitted and discharged on NYP-Q  for gastric outlet obstruction, mild pyloric extrinsic stenosis, and impaired gastric motility. CT scan at the outside hospital demonstrated 7.7 cm ill-defined pancreatic mass that has increased in size and intra and extrahepatic biliary ductal dilation likely due to obstruction. Pt is now s/p EGD, and NG and NJ placement for decompression and TF with GI on 12/31.   - NGT to LCWS/NPO/IVF  - NJ Tube feed  - s/p EGD with sphincetoeromy and duodenal stent placement with GI  CTAP with new intrahepatic biliary duct dilatation suggesting extrahepatic biliary obstruction by tumor. Marked gastric distention may reflect gastric outlet obstruction by tumor. Increasing ascites. No small bowel obstruction.  -Surgery recs appreciated    Will continue to follow    Kerry Kilgore NP  Hematology/Oncology  [facility]  [phone] (Office)  [phone] (Alt office)  Evenings and weekends please call MD on call or office

## 2025-01-03 NOTE — DISCHARGE NOTE PROVIDER - CARE PROVIDER_API CALL
Toro Alonso-Yeou  Surgery  [address] [place] [id number]  Phone: [phone]  Fax: [phone]  Follow Up Time: 2 weeks    Magnus Giang  Gastroenterology  [address] [place] [id number]  Phone: [phone]  Fax: [phone]  Follow Up Time: 1 month

## 2025-01-03 NOTE — DISCHARGE NOTE PROVIDER - NSDCFUSCHEDAPPT_GEN_ALL_CORE_FT
Toro Alonso  [facility]  SURGONC HUYNH [address]  Scheduled Appointment: 01/03/2025    [facility]  CATSCAN [address]  Scheduled Appointment: 01/24/2025    [facility]  [facility]  Scheduled Appointment: 01/28/2025    Amol Fernandez  [facility]  [facility]  Scheduled Appointment: 01/28/2025    Marcelino España  [facility]  RADMED [address] [facility]  Scheduled Appointment: 02/06/2025

## 2025-01-03 NOTE — DISCHARGE NOTE PROVIDER - NSDCMRMEDTOKEN_GEN_ALL_CORE_FT
acetaminophen 160 mg/5 mL oral suspension: 31.25 milliliter(s) orally every 8 hours as needed for  mild pain  amLODIPine 5 mg oral tablet: 1 tab(s) orally once a day  aspirin 81 mg oral tablet, chewable: 1 tab(s) orally once a day  atorvastatin 20 mg oral tablet: 1 tab(s) orally once a day  Digox 125 mcg (0.125 mg) oral tablet: 1 tab(s) orally once a day  Eliquis 5 mg oral tablet: 1 tab(s) orally 2 times a day Resume on SATURDAY JANUARY 4th  isosorbide dinitrate 30 mg oral tablet: 1 tab(s) orally 2 times a day  lactulose 10 g/15 mL oral syrup: 15 milliliter(s) orally once a day  losartan 50 mg oral tablet: 1 tab(s) orally once a day  methadone 10 mg oral tablet: 1 tab(s) orally 3 times a day   acetaminophen 160 mg/5 mL oral suspension: 31.25 milliliter(s) orally every 8 hours as needed for  mild pain  amLODIPine 5 mg oral tablet: 1 tab(s) orally once a day  aspirin 81 mg oral tablet, chewable: 1 tab(s) orally once a day  atorvastatin 20 mg oral tablet: 1 tab(s) orally once a day  Digox 125 mcg (0.125 mg) oral tablet: 1 tab(s) orally once a day  Eliquis 5 mg oral tablet: 1 tab(s) orally 2 times a day Resume on SATURDAY JANUARY 4th  isosorbide dinitrate 30 mg oral tablet: 1 tab(s) orally 2 times a day  lactulose 10 g/15 mL oral syrup: 15 milliliter(s) orally once a day  methadone 10 mg oral tablet: 1 tab(s) orally 3 times a day   acetaminophen 160 mg/5 mL oral suspension: 31.25 milliliter(s) orally every 8 hours as needed for  mild pain  amLODIPine 5 mg oral tablet: 1 tab(s) orally once a day  aspirin 81 mg oral tablet, chewable: 1 tab(s) orally once a day  atorvastatin 20 mg oral tablet: 1 tab(s) orally once a day  Digox 125 mcg (0.125 mg) oral tablet: 1 tab(s) orally once a day  Eliquis 5 mg oral tablet: 1 tab(s) orally 2 times a day Resume on SATURDAY JANUARY 4th  gabapentin 400 mg oral capsule: 1 cap(s) orally every 8 hours  isosorbide dinitrate 30 mg oral tablet: 1 tab(s) orally 2 times a day  lactulose 10 g/15 mL oral syrup: 15 milliliter(s) orally once a day  methadone 10 mg oral tablet: 1 tab(s) orally 3 times a day

## 2025-01-03 NOTE — PROGRESS NOTE ADULT - SUBJECTIVE AND OBJECTIVE BOX
Subjective: Patient seen and examined. No new events except as noted.     SUBJECTIVE/ROS:  nad      MEDICATIONS:  MEDICATIONS  (STANDING):  dextrose 5%. 1000 milliLiter(s) (50 mL/Hr) IV Continuous <Continuous>  dextrose 5%. 1000 milliLiter(s) (100 mL/Hr) IV Continuous <Continuous>  dextrose 50% Injectable 25 Gram(s) IV Push once  dextrose 50% Injectable 12.5 Gram(s) IV Push once  dextrose 50% Injectable 25 Gram(s) IV Push once  digoxin  Injectable 125 MICROGram(s) IV Push daily  gabapentin 400 milliGRAM(s) Oral every 8 hours  glucagon  Injectable 1 milliGRAM(s) IntraMuscular once  influenza  Vaccine (HIGH DOSE) 0.5 milliLiter(s) IntraMuscular once  insulin lispro (ADMELOG) corrective regimen sliding scale   SubCutaneous every 6 hours  methadone    Tablet 10 milliGRAM(s) Oral every 8 hours  metoprolol tartrate Injectable 5 milliGRAM(s) IV Push every 6 hours  ondansetron Injectable 4 milliGRAM(s) IV Push once  potassium phosphate / sodium phosphate Powder (PHOS-NaK) 1 Packet(s) Oral once      PHYSICAL EXAM:  T(C): 36.4 (01-03-25 @ 05:11), Max: 37.1 (01-02-25 @ 12:09)  HR: 58 (01-03-25 @ 05:11) (46 - 59)  BP: 128/70 (01-03-25 @ 05:11) (122/69 - 154/62)  RR: 18 (01-03-25 @ 05:11) (12 - 18)  SpO2: 99% (01-03-25 @ 05:11) (94% - 100%)  Wt(kg): --  I&O's Summary    02 Jan 2025 07:01  -  03 Jan 2025 07:00  --------------------------------------------------------  IN: 2270 mL / OUT: 1600 mL / NET: 670 mL            JVP: Normal  Neck: supple  Lung: clear   CV: S1 S2 , Murmur:  Abd: soft  Ext: No edema  neuro: Awake / alert  Psych: flat affect  Skin: normal``    LABS/DATA:    CARDIAC MARKERS:                                10.3   4.57  )-----------( 115      ( 03 Jan 2025 05:41 )             30.3     01-03    135  |  103  |  9   ----------------------------<  204[H]  4.5   |  23  |  0.58    Ca    8.9      03 Jan 2025 05:41  Phos  2.6     01-03  Mg     2.20     01-03    TPro  6.2  /  Alb  3.3  /  TBili  0.6  /  DBili  0.3  /  AST  30  /  ALT  22  /  AlkPhos  413[H]  01-03    proBNP:   Lipid Profile:   HgA1c:   TSH:     TELE:  EKG:

## 2025-01-03 NOTE — DISCHARGE NOTE NURSING/CASE MANAGEMENT/SOCIAL WORK - FINANCIAL ASSISTANCE
[facility] provides services at a reduced cost to those who are determined to be eligible through [facility]’s financial assistance program. Information regarding [facility]’s financial assistance program can be found by going to [email or web address] or by calling [phone].

## 2025-01-04 RX ORDER — APIXABAN 5 MG/1
1 TABLET, FILM COATED ORAL
Qty: 0 | Refills: 0 | DISCHARGE
Start: 2025-01-04

## 2025-01-10 ENCOUNTER — EMERGENCY (EMERGENCY)
Facility: HOSPITAL | Age: 70
LOS: 1 days | Discharge: HOME | End: 2025-01-10
Attending: EMERGENCY MEDICINE | Admitting: EMERGENCY MEDICINE
Payer: MEDICARE

## 2025-01-10 VITALS
WEIGHT: 119.05 LBS | OXYGEN SATURATION: 96 % | TEMPERATURE: 98 F | SYSTOLIC BLOOD PRESSURE: 128 MMHG | RESPIRATION RATE: 15 BRPM | HEIGHT: 68.86 IN | HEART RATE: 65 BPM | DIASTOLIC BLOOD PRESSURE: 79 MMHG

## 2025-01-10 DIAGNOSIS — Z95.5 PRESENCE OF CORONARY ANGIOPLASTY IMPLANT AND GRAFT: Chronic | ICD-10-CM

## 2025-01-10 PROCEDURE — 93010 ELECTROCARDIOGRAM REPORT: CPT

## 2025-01-10 PROCEDURE — 99285 EMERGENCY DEPT VISIT HI MDM: CPT

## 2025-01-10 NOTE — ED PROVIDER NOTE - CLINICAL SUMMARY MEDICAL DECISION MAKING FREE TEXT BOX
Sarita: Pancreatic cancer w/ 2 recently-placed stents. P/w N/V, decreased PO. CT to eval stents. IVF. N/V meds. Sarita: Pancreatic cancer w/ 2 recently-placed stents. P/w N/V, decreased PO. CT to eval stents. IVF. N/V meds.    68 y/o M with a history of stage III pancreatic adenocarcinoma s/p radiation and palliative chemotherapy c/b gastric outlet obstruction, Type 2 DM, paroxysmal Afib, CAD, HLD presenting with episodes of non bilious non bloody vomiting and poor po intake for the past 2 days. The daughter at bedside is providing history and translation. Patient is passing flatus and had a loose BM yesterday. Of note, patient had recent admission (discharged on [facility] 3 2025) for gastric outlet obstruction and pt underwent biliary stent placement,  EGD, and NG and NJ placement for decompression. Denies fever, chills, headaches. sick contacts, recent travel, chest pain, lower extremity pain. Is at baseline mental status per daughter. Afebrile, non toxic appearing. Will evaluate with labs, IV fluids, CT A/P w IV contrast to eval 2 recently placed stents for any procedural complication vs progression of disease with gastric outlet obstruction. IV meds for N/V and NPO for now, re-assess after CT scans

## 2025-01-10 NOTE — ED PROVIDER NOTE - CARE PLAN
Principal Discharge DX:	Nausea and/or vomiting   1 Principal Discharge DX:	Nausea and/or vomiting  Assessment and plan of treatment:	see MDM

## 2025-01-10 NOTE — ED ADULT NURSE NOTE - CHIEF COMPLAINT QUOTE
Pt c/o abdominal pain, pt s/p abdominal stent placement for pancreatic cancer. Pt not on chemotherapy. Pt c/o 2 episodes of vomiting and abdominal distention. Pt appears pale, weak, and clammy, unable to keep eyes open during triage interview. Charge RN made aware. Hx of HTN, CAD with 3 stents. Pt on Eliquis.

## 2025-01-10 NOTE — ED PROVIDER NOTE - NS ED ROS FT
REVIEW OF SYSTEMS:    CONSTITUTIONAL: +weakness, no fevers or chills  EYES/ENT: No visual changes;  No vertigo or throat pain   NECK: No pain or stiffness  RESPIRATORY: No cough, wheezing, hemoptysis; No shortness of breath  CARDIOVASCULAR: No chest pain or palpitations  GASTROINTESTINAL: +abdominal pain. +nausea, +vomiting, no hematemesis; No diarrhea or constipation. No melena or hematochezia.  GENITOURINARY: No dysuria, frequency or hematuria  NEUROLOGICAL: No numbness or weakness  SKIN: No itching, rashes

## 2025-01-10 NOTE — ED ADULT NURSE NOTE - DRUG PRE-SCREENING (DAST -1)

## 2025-01-10 NOTE — ED PROVIDER NOTE - PATIENT PORTAL LINK FT
You can access the FollowMyHealth Patient Portal offered by [facility] by registering at the following website: [email or web address]. By joining [employer]’s FollowMyHealth portal, you will also be able to view your health information using other applications (apps) compatible with our system.

## 2025-01-10 NOTE — ED PROVIDER NOTE - PROGRESS NOTE DETAILS
CT A/P w IV contrast prelim read shows: Interval placement of a common bile duct stent. Stable ill-defined infiltrating soft tissue mass in the elvira hepatis encasing and constricting main portal vein. Persistent intrahepatic biliary ductal dilatation particularly in the left hepatic lobe to the level of the elvira hepatis.  Interval placement a stent in the pylorus bridging circumferential infiltrating pyloric mass. Stable infiltrating pancreatic neoplasm.  Peritoneal carcinomatosis and omental caking. Complex probably   loculated moderate ascites. Discussed with daughter at bedside that there is no evidence of stent complication or any acute obstruction, will plan to discharge with PO zofran for symptom management and pt has existing pain regimen at home via onc that works well for him. Return precautions given.

## 2025-01-10 NOTE — ED PROVIDER NOTE - OBJECTIVE STATEMENT
68 y/o M with a history of stage III pancreatic adenocarcinoma s/p radiation and palliative chemotherapy c/b gastric outlet obstruction, Type 2 DM, paroxysmal Afib, CAD, HLD presenting with episodes of non bilious non bloody vomiting and poor po intake for the past 2 days. The daughter at bedside is providing history and translation. Patient is passing flatus and had a loose BM yesterday. Of note, patient had recent admission (discharged on [facility] 3 2025) for gastric outlet obstruction and pt underwent biliary stent placement,  EGD, and NG and NJ placement for decompression. Denies fever, chills, headaches. sick contacts, recent travel, chest pain, lower extremity pain. Is at baseline mental status per daughter

## 2025-01-10 NOTE — ED ADULT TRIAGE NOTE - CHIEF COMPLAINT QUOTE
Pt c/o abdominal pain, pt s/p abdominal stent placement for pancreatic cancer. Pt not on chemotherapy. Pt c/o 2 episodes of vomiting and abdominal distention. Pt appears weak, unable to keep eyes open during triage interview. Charge RN made aware. Hx of HTN, CAD with 3 stents. Pt on Eliquis. Pt c/o abdominal pain, pt s/p abdominal stent placement for pancreatic cancer. Pt not on chemotherapy. Pt c/o 2 episodes of vomiting and abdominal distention. Pt appears pale, weak, and clammy, unable to keep eyes open during triage interview. Charge RN made aware. Hx of HTN, CAD with 3 stents. Pt on Eliquis.

## 2025-01-10 NOTE — ED ADULT NURSE NOTE - OBJECTIVE STATEMENT
Neyda RN: Pieter, history of pancreatic cancer (last chemo june 2024, last radiation september 2024, port to R upper chest wall noted), CAD with stents, brought into ED by daughter for vomiting (yellow/bile), decreased PO intake, generalized weakness, diffuse abdominal pain/bloating x 2 days. States last bowel movement was yesterday, small amount of diarrhea. Appears pale and cachectic. Abdomen appears distended. Daughter states he recently was hospitalized for SBO/ stent placement on 01/02/25 by Dr. Duran. Respirations are even and unlabored, sating at 99% on room air, sinus bradycardia on monitor, afebrile, BP stable, 18 G to R forearm placed. Report given to primary RN. Safety maintained.

## 2025-01-10 NOTE — ED PROVIDER NOTE - PHYSICAL EXAMINATION
VITALS:   T(C): 36.5 (01-10-25 @ 23:30), Max: 36.6 (01-10-25 @ 22:38)  HR: 58 (01-10-25 @ 23:30) (58 - 65)  BP: 132/84 (01-10-25 @ 23:30) (128/79 - 132/84)  RR: 14 (01-10-25 @ 23:30) (14 - 15)  SpO2: 99% (01-10-25 @ 23:30) (96% - 99%)    GENERAL: +in moderate distress, cachectic appearing  HEAD:  Atraumatic, normocephalic  EYES: EOMI, PERRLA, conjunctiva and sclera clear  ENT: +Dry mucous membranes  NECK: Supple, no JVD  HEART: Regular rate and rhythm, no murmurs, rubs, or gallops  CHEST: +RUQ chest wall port  LUNGS: Unlabored respirations.  Clear to auscultation bilaterally, no crackles, wheezing, or rhonchi  ABDOMEN: Soft, +tender to palpation in LLQ, nondistended, +BS  EXTREMITIES: 2+ peripheral pulses bilaterally. No clubbing, cyanosis, 2+ LE edema  NERVOUS SYSTEM:  A&Ox2, no focal deficits   SKIN: No rashes or lesions

## 2025-01-10 NOTE — ED PROVIDER NOTE - CARE PROVIDER_API CALL
[facility]  Internal Medicine  [id number] [address] [place] [address]  Phone: [phone]  Fax: [phone]  Established Patient  Follow Up Time:

## 2025-01-10 NOTE — ED PROVIDER NOTE - AVIAN FLU SYMPTOMS
No [Negative] : Endocrine [TextBox_14] : per HPI [TextBox_30] : per HPI [TextBox_44] : per HPI [TextBox_57] : per HPI [TextBox_69] : per HPI

## 2025-01-10 NOTE — ED PROVIDER NOTE - NSICDXPASTMEDICALHX_GEN_ALL_CORE_FT
PAST MEDICAL HISTORY:  CAD (coronary artery disease)     Hypertension     Pancreatic cancer     Stented coronary artery x 1 stent

## 2025-01-10 NOTE — ED PROVIDER NOTE - ATTENDING CONTRIBUTION TO CARE
I performed a face-to-face evaluation of the patient and performed a history and physical examination. I agree with the history and physical examination. If this was a PA visit, I personally saw the patient with the PA and performed a substantive portion of the visit including all aspects of the medical decision making.    Pancreatic cancer w/ 2 recently-placed stents. P/w N/V, decreased PO. CT to eval stents. IVF. N/V meds.

## 2025-01-10 NOTE — ED PROVIDER NOTE - NSFOLLOWUPINSTRUCTIONS_ED_ALL_ED_FT
You presented to the ER with abdominal pain, nausea/vomiting, and inability to tolerate food. You were evaluated and had CT of your abdomen and pelvis that did not show any acute stent complication/blockage or any obstruction. Your symptoms improved after the medications you received in the ER. You will be discharged with oral zofran to take every 4 hrs as needed for severe nausea/vomiting. Please follow up with your primary care doctor and oncologist within one week of discharge. If you develop worsening abdominal pain, bloating, changes in bowel habits, fevers, chills, inability to tolerate PO please present to the ER to be re-evaluated

## 2025-01-11 VITALS
HEART RATE: 57 BPM | SYSTOLIC BLOOD PRESSURE: 112 MMHG | OXYGEN SATURATION: 98 % | RESPIRATION RATE: 15 BRPM | DIASTOLIC BLOOD PRESSURE: 81 MMHG | TEMPERATURE: 98 F

## 2025-01-11 LAB
ALBUMIN SERPL ELPH-MCNC: 3.5 G/DL — SIGNIFICANT CHANGE UP (ref 3.3–5)
ALP SERPL-CCNC: 690 U/L — HIGH (ref 40–120)
ALT FLD-CCNC: 38 U/L — SIGNIFICANT CHANGE UP (ref 4–41)
ANION GAP SERPL CALC-SCNC: 15 MMOL/L — HIGH (ref 7–14)
APTT BLD: 32.8 SEC — SIGNIFICANT CHANGE UP (ref 24.5–35.6)
AST SERPL-CCNC: 57 U/L — HIGH (ref 4–40)
BASOPHILS # BLD AUTO: 0.01 K/UL — SIGNIFICANT CHANGE UP (ref 0–0.2)
BASOPHILS NFR BLD AUTO: 0.2 % — SIGNIFICANT CHANGE UP (ref 0–2)
BILIRUB SERPL-MCNC: 1.2 MG/DL — SIGNIFICANT CHANGE UP (ref 0.2–1.2)
BLD GP AB SCN SERPL QL: NEGATIVE — SIGNIFICANT CHANGE UP
BLOOD GAS VENOUS COMPREHENSIVE RESULT: SIGNIFICANT CHANGE UP
BUN SERPL-MCNC: 9 MG/DL — SIGNIFICANT CHANGE UP (ref 7–23)
CALCIUM SERPL-MCNC: 9 MG/DL — SIGNIFICANT CHANGE UP (ref 8.4–10.5)
CHLORIDE SERPL-SCNC: 99 MMOL/L — SIGNIFICANT CHANGE UP (ref 98–107)
CO2 SERPL-SCNC: 20 MMOL/L — LOW (ref 22–31)
CREAT SERPL-MCNC: 0.67 MG/DL — SIGNIFICANT CHANGE UP (ref 0.5–1.3)
EGFR: 100 ML/MIN/1.73M2 — SIGNIFICANT CHANGE UP
EOSINOPHIL # BLD AUTO: 0.06 K/UL — SIGNIFICANT CHANGE UP (ref 0–0.5)
EOSINOPHIL NFR BLD AUTO: 1.4 % — SIGNIFICANT CHANGE UP (ref 0–6)
GLUCOSE SERPL-MCNC: 84 MG/DL — SIGNIFICANT CHANGE UP (ref 70–99)
HCT VFR BLD CALC: 30.8 % — LOW (ref 39–50)
HGB BLD-MCNC: 10 G/DL — LOW (ref 13–17)
IANC: 3.08 K/UL — SIGNIFICANT CHANGE UP (ref 1.8–7.4)
IMM GRANULOCYTES NFR BLD AUTO: 0.5 % — SIGNIFICANT CHANGE UP (ref 0–0.9)
INR BLD: 1.48 RATIO — HIGH (ref 0.85–1.16)
LYMPHOCYTES # BLD AUTO: 0.52 K/UL — LOW (ref 1–3.3)
LYMPHOCYTES # BLD AUTO: 12.3 % — LOW (ref 13–44)
MAGNESIUM SERPL-MCNC: 2.2 MG/DL — SIGNIFICANT CHANGE UP (ref 1.6–2.6)
MCHC RBC-ENTMCNC: 31.1 PG — SIGNIFICANT CHANGE UP (ref 27–34)
MCHC RBC-ENTMCNC: 32.5 G/DL — SIGNIFICANT CHANGE UP (ref 32–36)
MCV RBC AUTO: 95.7 FL — SIGNIFICANT CHANGE UP (ref 80–100)
MONOCYTES # BLD AUTO: 0.54 K/UL — SIGNIFICANT CHANGE UP (ref 0–0.9)
MONOCYTES NFR BLD AUTO: 12.8 % — SIGNIFICANT CHANGE UP (ref 2–14)
NEUTROPHILS # BLD AUTO: 3.08 K/UL — SIGNIFICANT CHANGE UP (ref 1.8–7.4)
NEUTROPHILS NFR BLD AUTO: 72.8 % — SIGNIFICANT CHANGE UP (ref 43–77)
NRBC # BLD: 0 /100 WBCS — SIGNIFICANT CHANGE UP (ref 0–0)
NRBC # FLD: 0 K/UL — SIGNIFICANT CHANGE UP (ref 0–0)
PHOSPHATE SERPL-MCNC: 2.7 MG/DL — SIGNIFICANT CHANGE UP (ref 2.5–4.5)
PLATELET # BLD AUTO: 140 K/UL — LOW (ref 150–400)
POTASSIUM SERPL-MCNC: 4 MMOL/L — SIGNIFICANT CHANGE UP (ref 3.5–5.3)
POTASSIUM SERPL-SCNC: 4 MMOL/L — SIGNIFICANT CHANGE UP (ref 3.5–5.3)
PROT SERPL-MCNC: 6.7 G/DL — SIGNIFICANT CHANGE UP (ref 6–8.3)
PROTHROM AB SERPL-ACNC: 17.5 SEC — HIGH (ref 9.9–13.4)
RBC # BLD: 3.22 M/UL — LOW (ref 4.2–5.8)
RBC # FLD: 13.5 % — SIGNIFICANT CHANGE UP (ref 10.3–14.5)
RH IG SCN BLD-IMP: POSITIVE — SIGNIFICANT CHANGE UP
SODIUM SERPL-SCNC: 134 MMOL/L — LOW (ref 135–145)
WBC # BLD: 4.23 K/UL — SIGNIFICANT CHANGE UP (ref 3.8–10.5)
WBC # FLD AUTO: 4.23 K/UL — SIGNIFICANT CHANGE UP (ref 3.8–10.5)

## 2025-01-11 PROCEDURE — 74177 CT ABD & PELVIS W/CONTRAST: CPT | Mod: 26

## 2025-01-11 RX ORDER — SODIUM CHLORIDE 9 MG/ML
1000 INJECTION, SOLUTION INTRAVENOUS ONCE
Refills: 0 | Status: COMPLETED | OUTPATIENT
Start: 2025-01-11 | End: 2025-01-11

## 2025-01-11 RX ORDER — ONDANSETRON 4 MG/1
4 TABLET ORAL ONCE
Refills: 0 | Status: COMPLETED | OUTPATIENT
Start: 2025-01-11 | End: 2025-01-11

## 2025-01-11 RX ORDER — HYDROMORPHONE HCL 4 MG
1 TABLET ORAL ONCE
Refills: 0 | Status: DISCONTINUED | OUTPATIENT
Start: 2025-01-11 | End: 2025-01-11

## 2025-01-11 RX ORDER — SIMETHICONE 125 MG/1
80 CAPSULE, LIQUID FILLED ORAL ONCE
Refills: 0 | Status: COMPLETED | OUTPATIENT
Start: 2025-01-11 | End: 2025-01-11

## 2025-01-11 RX ORDER — ONDANSETRON 4 MG/1
1 TABLET ORAL
Qty: 60 | Refills: 0
Start: 2025-01-11 | End: 2025-01-20

## 2025-01-11 RX ADMIN — Medication 1 MILLIGRAM(S): at 00:53

## 2025-01-11 RX ADMIN — ONDANSETRON 4 MILLIGRAM(S): 4 TABLET ORAL at 00:53

## 2025-01-11 RX ADMIN — SIMETHICONE 80 MILLIGRAM(S): 125 CAPSULE, LIQUID FILLED ORAL at 06:20

## 2025-01-11 RX ADMIN — SODIUM CHLORIDE 1000 MILLILITER(S): 9 INJECTION, SOLUTION INTRAVENOUS at 00:53

## 2025-01-11 NOTE — ED ADULT NURSE REASSESSMENT NOTE - NS ED NURSE REASSESS COMMENT FT1
Received report from float RN. Pt A&Ox3. Pt in room. Pt Sinus alberta on CM. Per daughter pt had episodes of vomiting and abdominal distention. Pt appears pale, weak, and clammy. Pt abdomen very hard and distended. Pt family stating pt has not been able to eat. Safety maintained. Awaiting MD orders

## 2025-01-11 NOTE — ED ADULT NURSE REASSESSMENT NOTE - NS ED NURSE REASSESS COMMENT FT1
report received from BOB garcia. pt A&Ox4, resting in stretcher. pt offers no complaints at this time. pending CT scan results. safety maintained, call bell in reach

## 2025-01-13 PROBLEM — C25.9 MALIGNANT NEOPLASM OF PANCREAS, UNSPECIFIED: Chronic | Status: ACTIVE | Noted: 2025-01-11

## 2025-01-14 ENCOUNTER — APPOINTMENT (OUTPATIENT)
Dept: SURGICAL ONCOLOGY | Facility: CLINIC | Age: 70
End: 2025-01-14
Payer: MEDICARE

## 2025-01-14 DIAGNOSIS — C25.1 MALIGNANT NEOPLASM OF BODY OF PANCREAS: ICD-10-CM

## 2025-01-14 PROCEDURE — 99214 OFFICE O/P EST MOD 30 MIN: CPT | Mod: 93

## 2025-01-16 ENCOUNTER — TRANSCRIPTION ENCOUNTER (OUTPATIENT)
Age: 70
End: 2025-01-16

## 2025-01-16 ENCOUNTER — OUTPATIENT (OUTPATIENT)
Dept: OUTPATIENT SERVICES | Facility: HOSPITAL | Age: 70
LOS: 1 days | End: 2025-01-16
Payer: MEDICARE

## 2025-01-16 ENCOUNTER — RESULT REVIEW (OUTPATIENT)
Age: 70
End: 2025-01-16

## 2025-01-16 ENCOUNTER — INPATIENT (INPATIENT)
Facility: HOSPITAL | Age: 70
LOS: 7 days | Discharge: HOME | DRG: 312 | End: 2025-01-24
Attending: STUDENT IN AN ORGANIZED HEALTH CARE EDUCATION/TRAINING PROGRAM | Admitting: STUDENT IN AN ORGANIZED HEALTH CARE EDUCATION/TRAINING PROGRAM
Payer: MEDICARE

## 2025-01-16 VITALS
WEIGHT: 126.1 LBS | SYSTOLIC BLOOD PRESSURE: 133 MMHG | OXYGEN SATURATION: 98 % | RESPIRATION RATE: 14 BRPM | TEMPERATURE: 99 F | HEART RATE: 55 BPM | DIASTOLIC BLOOD PRESSURE: 77 MMHG | HEIGHT: 69.29 IN

## 2025-01-16 VITALS
SYSTOLIC BLOOD PRESSURE: 160 MMHG | OXYGEN SATURATION: 96 % | DIASTOLIC BLOOD PRESSURE: 96 MMHG | HEART RATE: 56 BPM | WEIGHT: 126.1 LBS | HEIGHT: 69.29 IN | RESPIRATION RATE: 18 BRPM

## 2025-01-16 DIAGNOSIS — R55 SYNCOPE AND COLLAPSE: ICD-10-CM

## 2025-01-16 DIAGNOSIS — I25.10 ATHEROSCLEROTIC HEART DISEASE OF NATIVE CORONARY ARTERY WITHOUT ANGINA PECTORIS: ICD-10-CM

## 2025-01-16 DIAGNOSIS — I10 ESSENTIAL (PRIMARY) HYPERTENSION: ICD-10-CM

## 2025-01-16 DIAGNOSIS — Z95.5 PRESENCE OF CORONARY ANGIOPLASTY IMPLANT AND GRAFT: Chronic | ICD-10-CM

## 2025-01-16 DIAGNOSIS — I48.0 PAROXYSMAL ATRIAL FIBRILLATION: ICD-10-CM

## 2025-01-16 DIAGNOSIS — Z71.89 OTHER SPECIFIED COUNSELING: ICD-10-CM

## 2025-01-16 DIAGNOSIS — C25.9 MALIGNANT NEOPLASM OF PANCREAS, UNSPECIFIED: ICD-10-CM

## 2025-01-16 DIAGNOSIS — C25.1 MALIGNANT NEOPLASM OF BODY OF PANCREAS: ICD-10-CM

## 2025-01-16 LAB
ALBUMIN FLD-MCNC: 1.2 G/DL — SIGNIFICANT CHANGE UP
ALBUMIN SERPL ELPH-MCNC: 3.4 G/DL — SIGNIFICANT CHANGE UP (ref 3.3–5)
ALP SERPL-CCNC: 762 U/L — HIGH (ref 40–120)
ALT FLD-CCNC: 33 U/L — SIGNIFICANT CHANGE UP (ref 10–45)
AMMONIA BLD-MCNC: 22 UMOL/L — SIGNIFICANT CHANGE UP (ref 11–55)
ANION GAP SERPL CALC-SCNC: 13 MMOL/L — SIGNIFICANT CHANGE UP (ref 5–17)
ANISOCYTOSIS BLD QL: SLIGHT — SIGNIFICANT CHANGE UP
APTT BLD: 33.7 SEC — SIGNIFICANT CHANGE UP (ref 24.5–35.6)
AST SERPL-CCNC: 64 U/L — HIGH (ref 10–40)
B PERT IGG+IGM PNL SER: CLEAR — SIGNIFICANT CHANGE UP
BASOPHILS # BLD AUTO: 0 K/UL — SIGNIFICANT CHANGE UP (ref 0–0.2)
BASOPHILS NFR BLD AUTO: 0 % — SIGNIFICANT CHANGE UP (ref 0–2)
BILIRUB SERPL-MCNC: 1.3 MG/DL — HIGH (ref 0.2–1.2)
BLD GP AB SCN SERPL QL: NEGATIVE — SIGNIFICANT CHANGE UP
BUN SERPL-MCNC: 10 MG/DL — SIGNIFICANT CHANGE UP (ref 7–23)
BURR CELLS BLD QL SMEAR: PRESENT — SIGNIFICANT CHANGE UP
CALCIUM SERPL-MCNC: 9.1 MG/DL — SIGNIFICANT CHANGE UP (ref 8.4–10.5)
CHLORIDE SERPL-SCNC: 102 MMOL/L — SIGNIFICANT CHANGE UP (ref 96–108)
CO2 SERPL-SCNC: 21 MMOL/L — LOW (ref 22–31)
COLOR FLD: YELLOW
CREAT SERPL-MCNC: 0.68 MG/DL — SIGNIFICANT CHANGE UP (ref 0.5–1.3)
DACRYOCYTES BLD QL SMEAR: SLIGHT — SIGNIFICANT CHANGE UP
EGFR: 99 ML/MIN/1.73M2 — SIGNIFICANT CHANGE UP
EOSINOPHIL # BLD AUTO: 0.13 K/UL — SIGNIFICANT CHANGE UP (ref 0–0.5)
EOSINOPHIL # FLD: 1 % — SIGNIFICANT CHANGE UP
EOSINOPHIL NFR BLD AUTO: 2.7 % — SIGNIFICANT CHANGE UP (ref 0–6)
FLUID INTAKE SUBSTANCE CLASS: SIGNIFICANT CHANGE UP
GLUCOSE BLDC GLUCOMTR-MCNC: 69 MG/DL — LOW (ref 70–99)
GLUCOSE BLDC GLUCOMTR-MCNC: 71 MG/DL — SIGNIFICANT CHANGE UP (ref 70–99)
GLUCOSE FLD-MCNC: 85 MG/DL — SIGNIFICANT CHANGE UP
GLUCOSE SERPL-MCNC: 75 MG/DL — SIGNIFICANT CHANGE UP (ref 70–99)
GRAM STN FLD: SIGNIFICANT CHANGE UP
HCT VFR BLD CALC: 34.5 % — LOW (ref 39–50)
HGB BLD-MCNC: 11.1 G/DL — LOW (ref 13–17)
INR BLD: 1.19 RATIO — HIGH (ref 0.85–1.16)
LDH SERPL L TO P-CCNC: 55 U/L — SIGNIFICANT CHANGE UP
LYMPHOCYTES # BLD AUTO: 0.08 K/UL — LOW (ref 1–3.3)
LYMPHOCYTES # BLD AUTO: 1.8 % — LOW (ref 13–44)
LYMPHOCYTES # FLD: 30 % — SIGNIFICANT CHANGE UP
MACROCYTES BLD QL: SLIGHT — SIGNIFICANT CHANGE UP
MAGNESIUM SERPL-MCNC: 2.2 MG/DL — SIGNIFICANT CHANGE UP (ref 1.6–2.6)
MANUAL SMEAR VERIFICATION: SIGNIFICANT CHANGE UP
MCHC RBC-ENTMCNC: 31.1 PG — SIGNIFICANT CHANGE UP (ref 27–34)
MCHC RBC-ENTMCNC: 32.2 G/DL — SIGNIFICANT CHANGE UP (ref 32–36)
MCV RBC AUTO: 96.6 FL — SIGNIFICANT CHANGE UP (ref 80–100)
MESOTHL CELL # FLD: SIGNIFICANT CHANGE UP
MONOCYTES # BLD AUTO: 0.08 K/UL — SIGNIFICANT CHANGE UP (ref 0–0.9)
MONOCYTES NFR BLD AUTO: 1.8 % — LOW (ref 2–14)
MONOS+MACROS # FLD: 30 % — SIGNIFICANT CHANGE UP
NEUTROPHILS # BLD AUTO: 4.36 K/UL — SIGNIFICANT CHANGE UP (ref 1.8–7.4)
NEUTROPHILS NFR BLD AUTO: 92.8 % — HIGH (ref 43–77)
NEUTROPHILS-BODY FLUID: 39 % — SIGNIFICANT CHANGE UP
NT-PROBNP SERPL-SCNC: 393 PG/ML — HIGH (ref 0–300)
OVALOCYTES BLD QL SMEAR: SLIGHT — SIGNIFICANT CHANGE UP
PHOSPHATE SERPL-MCNC: 3.1 MG/DL — SIGNIFICANT CHANGE UP (ref 2.5–4.5)
PLAT MORPH BLD: NORMAL — SIGNIFICANT CHANGE UP
PLATELET # BLD AUTO: 126 K/UL — LOW (ref 150–400)
POIKILOCYTOSIS BLD QL AUTO: SIGNIFICANT CHANGE UP
POTASSIUM SERPL-MCNC: 4.9 MMOL/L — SIGNIFICANT CHANGE UP (ref 3.5–5.3)
POTASSIUM SERPL-SCNC: 4.9 MMOL/L — SIGNIFICANT CHANGE UP (ref 3.5–5.3)
PROT FLD-MCNC: 1.8 G/DL — SIGNIFICANT CHANGE UP
PROT SERPL-MCNC: 6.4 G/DL — SIGNIFICANT CHANGE UP (ref 6–8.3)
PROTHROM AB SERPL-ACNC: 13.5 SEC — HIGH (ref 9.9–13.4)
RBC # BLD: 3.57 M/UL — LOW (ref 4.2–5.8)
RBC # BLD: 3.57 M/UL — LOW (ref 4.2–5.8)
RBC # FLD: 14.5 % — SIGNIFICANT CHANGE UP (ref 10.3–14.5)
RBC BLD AUTO: ABNORMAL
RCV VOL RI: <2000 CELLS/UL — HIGH
RETICS #: 77.1 K/UL — SIGNIFICANT CHANGE UP (ref 25–125)
RETICS/RBC NFR: 2.2 % — SIGNIFICANT CHANGE UP (ref 0.5–2.5)
RH IG SCN BLD-IMP: POSITIVE — SIGNIFICANT CHANGE UP
SODIUM SERPL-SCNC: 136 MMOL/L — SIGNIFICANT CHANGE UP (ref 135–145)
SPECIMEN SOURCE: SIGNIFICANT CHANGE UP
TARGETS BLD QL SMEAR: SLIGHT — SIGNIFICANT CHANGE UP
TOTAL CELLS COUNTED, BODY FLUID: 371 CELLS — SIGNIFICANT CHANGE UP
TOTAL NUCLEATED CELL COUNT, BODY FLUID: 371 CELLS/UL — SIGNIFICANT CHANGE UP
TROPONIN T, HIGH SENSITIVITY RESULT: 28 NG/L — SIGNIFICANT CHANGE UP (ref 0–51)
TROPONIN T, HIGH SENSITIVITY RESULT: 28 NG/L — SIGNIFICANT CHANGE UP (ref 0–51)
TUBE TYPE: SIGNIFICANT CHANGE UP
VARIANT LYMPHS # BLD: 0.9 % — SIGNIFICANT CHANGE UP (ref 0–6)
VARIANT LYMPHS NFR BLD MANUAL: 0.9 % — SIGNIFICANT CHANGE UP (ref 0–6)
WBC # BLD: 4.7 K/UL — SIGNIFICANT CHANGE UP (ref 3.8–10.5)
WBC # FLD AUTO: 4.7 K/UL — SIGNIFICANT CHANGE UP (ref 3.8–10.5)
WBC COUNT.: 362 CELLS/UL — SIGNIFICANT CHANGE UP

## 2025-01-16 PROCEDURE — 88342 IMHCHEM/IMCYTCHM 1ST ANTB: CPT

## 2025-01-16 PROCEDURE — 89051 BODY FLUID CELL COUNT: CPT

## 2025-01-16 PROCEDURE — 88305 TISSUE EXAM BY PATHOLOGIST: CPT | Mod: 26

## 2025-01-16 PROCEDURE — 99285 EMERGENCY DEPT VISIT HI MDM: CPT

## 2025-01-16 PROCEDURE — 88341 IMHCHEM/IMCYTCHM EA ADD ANTB: CPT | Mod: 26

## 2025-01-16 PROCEDURE — 82042 OTHER SOURCE ALBUMIN QUAN EA: CPT

## 2025-01-16 PROCEDURE — 88112 CYTOPATH CELL ENHANCE TECH: CPT

## 2025-01-16 PROCEDURE — 87070 CULTURE OTHR SPECIMN AEROBIC: CPT

## 2025-01-16 PROCEDURE — 88342 IMHCHEM/IMCYTCHM 1ST ANTB: CPT | Mod: 26

## 2025-01-16 PROCEDURE — 82945 GLUCOSE OTHER FLUID: CPT

## 2025-01-16 PROCEDURE — 84157 ASSAY OF PROTEIN OTHER: CPT

## 2025-01-16 PROCEDURE — 87102 FUNGUS ISOLATION CULTURE: CPT

## 2025-01-16 PROCEDURE — C1729: CPT

## 2025-01-16 PROCEDURE — 88112 CYTOPATH CELL ENHANCE TECH: CPT | Mod: 26

## 2025-01-16 PROCEDURE — 88341 IMHCHEM/IMCYTCHM EA ADD ANTB: CPT

## 2025-01-16 PROCEDURE — 49083 ABD PARACENTESIS W/IMAGING: CPT

## 2025-01-16 PROCEDURE — 87015 SPECIMEN INFECT AGNT CONCNTJ: CPT

## 2025-01-16 PROCEDURE — 83615 LACTATE (LD) (LDH) ENZYME: CPT

## 2025-01-16 PROCEDURE — 71045 X-RAY EXAM CHEST 1 VIEW: CPT | Mod: 26

## 2025-01-16 PROCEDURE — 87075 CULTR BACTERIA EXCEPT BLOOD: CPT

## 2025-01-16 PROCEDURE — 88305 TISSUE EXAM BY PATHOLOGIST: CPT

## 2025-01-16 PROCEDURE — 99223 1ST HOSP IP/OBS HIGH 75: CPT

## 2025-01-16 PROCEDURE — 87205 SMEAR GRAM STAIN: CPT

## 2025-01-16 RX ORDER — ACETAMINOPHEN, DIPHENHYDRAMINE HCL, PHENYLEPHRINE HCL 325; 25; 5 MG/1; MG/1; MG/1
3 TABLET ORAL AT BEDTIME
Refills: 0 | Status: DISCONTINUED | OUTPATIENT
Start: 2025-01-16 | End: 2025-01-24

## 2025-01-16 RX ORDER — ISOSORBIDE DINITRATE 40 MG/1
30 TABLET ORAL
Refills: 0 | Status: DISCONTINUED | OUTPATIENT
Start: 2025-01-16 | End: 2025-01-17

## 2025-01-16 RX ORDER — LACTULOSE 10 G/15ML
15 SOLUTION ORAL; RECTAL
Refills: 0 | DISCHARGE

## 2025-01-16 RX ORDER — DIGOXIN 250 MCG
1 TABLET ORAL
Refills: 0 | DISCHARGE

## 2025-01-16 RX ORDER — DM/PSEUDOEPHED/ACETAMINOPHEN 10-30-250
25 CAPSULE ORAL ONCE
Refills: 0 | Status: COMPLETED | OUTPATIENT
Start: 2025-01-16 | End: 2025-01-16

## 2025-01-16 RX ORDER — ATORVASTATIN CALCIUM 80 MG/1
20 TABLET, FILM COATED ORAL AT BEDTIME
Refills: 0 | Status: DISCONTINUED | OUTPATIENT
Start: 2025-01-16 | End: 2025-01-17

## 2025-01-16 RX ORDER — ACETAMINOPHEN 160 MG/5ML
650 SUSPENSION ORAL EVERY 6 HOURS
Refills: 0 | Status: DISCONTINUED | OUTPATIENT
Start: 2025-01-16 | End: 2025-01-24

## 2025-01-16 RX ORDER — SODIUM CHLORIDE 9 G/ML
500 INJECTION, SOLUTION INTRAVENOUS ONCE
Refills: 0 | Status: COMPLETED | OUTPATIENT
Start: 2025-01-16 | End: 2025-01-16

## 2025-01-16 RX ORDER — APIXABAN 5 MG/1
5 TABLET, FILM COATED ORAL
Refills: 0 | Status: DISCONTINUED | OUTPATIENT
Start: 2025-01-16 | End: 2025-01-17

## 2025-01-16 RX ORDER — ASPIRIN 81 MG/1
81 TABLET, COATED ORAL DAILY
Refills: 0 | Status: DISCONTINUED | OUTPATIENT
Start: 2025-01-16 | End: 2025-01-24

## 2025-01-16 RX ORDER — MAGNESIUM, ALUMINUM HYDROXIDE 200-225/5
30 SUSPENSION, ORAL (FINAL DOSE FORM) ORAL EVERY 4 HOURS
Refills: 0 | Status: DISCONTINUED | OUTPATIENT
Start: 2025-01-16 | End: 2025-01-24

## 2025-01-16 RX ORDER — GABAPENTIN 800 MG/1
400 TABLET ORAL EVERY 8 HOURS
Refills: 0 | Status: DISCONTINUED | OUTPATIENT
Start: 2025-01-16 | End: 2025-01-24

## 2025-01-16 RX ORDER — ONDANSETRON 4 MG/1
4 TABLET, ORALLY DISINTEGRATING ORAL EVERY 8 HOURS
Refills: 0 | Status: DISCONTINUED | OUTPATIENT
Start: 2025-01-16 | End: 2025-01-17

## 2025-01-16 RX ORDER — LACTULOSE 10 G/15 ML
10 SOLUTION, ORAL ORAL DAILY
Refills: 0 | Status: DISCONTINUED | OUTPATIENT
Start: 2025-01-16 | End: 2025-01-24

## 2025-01-16 RX ORDER — METHADONE HYDROCHLORIDE 10 MG/1
1 TABLET ORAL
Refills: 0 | DISCHARGE

## 2025-01-16 RX ORDER — METHADONE HYDROCHLORIDE 5 MG/5ML
10 SOLUTION ORAL THREE TIMES A DAY
Refills: 0 | Status: DISCONTINUED | OUTPATIENT
Start: 2025-01-16 | End: 2025-01-23

## 2025-01-16 RX ORDER — METHADONE HYDROCHLORIDE 5 MG/5ML
10 SOLUTION ORAL ONCE
Refills: 0 | Status: DISCONTINUED | OUTPATIENT
Start: 2025-01-16 | End: 2025-01-16

## 2025-01-16 RX ORDER — GABAPENTIN 800 MG/1
400 TABLET ORAL ONCE
Refills: 0 | Status: COMPLETED | OUTPATIENT
Start: 2025-01-16 | End: 2025-01-16

## 2025-01-16 RX ORDER — AMLODIPINE BESYLATE 5 MG
5 TABLET ORAL DAILY
Refills: 0 | Status: DISCONTINUED | OUTPATIENT
Start: 2025-01-16 | End: 2025-01-17

## 2025-01-16 RX ADMIN — METHADONE HYDROCHLORIDE 10 MILLIGRAM(S): 5 SOLUTION ORAL at 19:45

## 2025-01-16 RX ADMIN — Medication 25 MILLILITER(S): at 23:45

## 2025-01-16 RX ADMIN — GABAPENTIN 400 MILLIGRAM(S): 800 TABLET ORAL at 19:45

## 2025-01-16 RX ADMIN — SODIUM CHLORIDE 1000 MILLILITER(S): 9 INJECTION, SOLUTION INTRAVENOUS at 17:44

## 2025-01-16 RX ADMIN — ONDANSETRON 4 MILLIGRAM(S): 4 TABLET, ORALLY DISINTEGRATING ORAL at 21:49

## 2025-01-16 NOTE — H&P ADULT - HISTORY OF PRESENT ILLNESS
69y M PMH HTN, HLD, Afib on Eliquis, CAD s/p 3 stents, metastatic pancreatic cancer with duodenal obstruction, recent admission at [facility] to surgery for duodenal obstruction s/p stent placement  noted to have increased ascites so sent to IR suite today for therapeutic paracentesis (2.2 L removed) noted to have syncopal/near syncopal episode post para so code blue code called and pt was sent to ED for further eval.  Patient had completed paracentesis, had been discharged from IR and was being wheeled out by daughter when it was noticed that patient was complaining of chest discomfort and became unresponsive, though no loss of consciousness.  CODE BLUE was called and patient was brought to the emergency department for further evaluation.  Family reports that patient is not being offered chemotherapy by [facility] due to metastasis.  Despite poor prognosis, family maintains full code status for patient.    ROS: Denies HA, SOB, palpitation, N/V/D, fever, cough, chills, dizziness, sick contact, change in bowel or urinary habits   A 10-system ROS was performed and is negative except as noted above and/or in the HPI.      ED: IVF, Gabapentin, Methadone, placed on tele. Trop neg, BNP minima elevated. CXR neg  69y M PMH HTN, HLD, Afib on Eliquis, CAD s/p 3 stents, metastatic pancreatic cancer with duodenal obstruction, recent admission at [facility] to surgery for duodenal obstruction s/p stent placement  noted to have increased ascites so sent to IR suite today for therapeutic paracentesis (2.2 L removed) noted to have syncopal/near syncopal episode post para so code blue code called and pt was sent to ED for further eval.  Patient had completed paracentesis, had been discharged from IR and was being wheeled out by daughter when it was noticed that patient was complaining of chest discomfort and became unresponsive, though no loss of consciousness.  CODE BLUE was called and patient was brought to the emergency department for further evaluation.  Family reports that patient is not being offered chemotherapy by [facility] due to metastasis.  Despite poor prognosis, family maintains full code status for patient.     At time of encounter pt frail appearing, endorsing nausea, recently given zofran. family (son & daughter) at bedside     ROS: Denies HA, SOB, palpitation, N/V/D, fever, cough, chills, dizziness, sick contact, change in bowel or urinary habits   A 10-system ROS was performed and is negative except as noted above and/or in the HPI.      ED: IVF, Gabapentin, Methadone, placed on tele. Trop neg, BNP minima elevated. CXR neg  Mandarin speaking, family at bedside translating per Pt/family preference     69y M PMH HTN, HLD, Afib on Eliquis, CAD s/p 3 stents, metastatic pancreatic cancer with duodenal obstruction, recent admission at [facility] to surgery for duodenal obstruction s/p stent placement  noted to have increased ascites so sent to IR suite today for therapeutic paracentesis (2.2 L removed) noted to have syncopal/near syncopal episode post para so code blue code called and pt was sent to ED for further eval.  Patient had completed paracentesis, had been discharged from IR and was being wheeled out by daughter when it was noticed that patient was complaining of chest discomfort and became unresponsive, though no loss of consciousness.  CODE BLUE was called and patient was brought to the emergency department for further evaluation.  Family reports that patient is not being offered chemotherapy by [facility] due to metastasis.  Despite poor prognosis, family maintains full code status for patient.     At time of encounter pt frail appearing, endorsing nausea, recently given zofran. family (son & daughter) at bedside     ROS: Denies HA, SOB, palpitation, N/V/D, fever, cough, chills, dizziness, sick contact, change in bowel or urinary habits   A 10-system ROS was performed and is negative except as noted above and/or in the HPI.      ED: IVF, Gabapentin, Methadone, placed on tele. Trop neg, BNP minima elevated. CXR neg

## 2025-01-16 NOTE — ASU DISCHARGE PLAN (ADULT/PEDIATRIC) - FINANCIAL ASSISTANCE
[facility] provides services at a reduced cost to those who are determined to be eligible through [facility]’s financial assistance program. Information regarding [facility]’s financial assistance program can be found by going to [email or web address] or by calling [phone].

## 2025-01-16 NOTE — ED ADULT NURSE NOTE - OBJECTIVE STATEMENT
Pt presents to the ED sp syncope at IR during paracentesis today. Hx stage iv pancreatic ca.  States he felt lightheaded  and had central chest pressure.PT presented to the ED as code blue from IR. Labs drawn and sent.

## 2025-01-16 NOTE — ED PROVIDER NOTE - PHYSICAL EXAMINATION
GENERAL: no acute distress, ectomorphic body habitus  HEENT: atraumatic, normocephalic, vision grossly intact, EOMI, no conjunctivitis or discharge, hearing grossly intact, no nasal discharge or epistaxis, clear pharynx  CV: regular rate, normal rhythm, normal S1/S2, no murmurs/rubs, no cyanosis  PULM: normal work of breathing, normal O2 saturation on RA, clear breath sounds in b/l upper/lower lung fields, no crackles/rales/rhonchi/wheezing  GI: mildly tender/slightly distended abdomen  NEURO: A&Ox2, speaking few words with family at a time

## 2025-01-16 NOTE — ASU DISCHARGE PLAN (ADULT/PEDIATRIC) - ASU DC SPECIAL INSTRUCTIONSFT
Paracentesis    Discharge Instructions  - You have had a paracentesis.  - You may shower in 24 hours.  - Keep the area covered and dry for the next 24 hours.  - Do not perform any heavy lifting until the site is healed.  - You may resume your normal diet.  - You may resume your normal medications however you should wait 24 hours before restarting aspirin, plavix, or blood thinners.  - It is normal to experience some pain over the site for the next few days. You may take apply ice to the area (20 minutes on, 20 minutes off) and take Tylenol for that pain. Do not take more frequently than every 6 hours and do not exceed more than 3000mg of Tylenol in a 24 hour period.    Notify your primary physician and/or Interventional Radiology IMMEDIATELY if you experience any of the following       - Fever of 100.4F or 38C       - Chills or Rigors/ Shakes       - Swelling and/or Redness in the area around the biopsy site       - Worsening Pain       - Blood soaked bandages or worsening bleeding       - Lightheadedness and/or dizziness upon standing       - Chest Pain/ Tightness       - Shortness of Breath       - Difficulty walking    If you have a problem that you believe requires IMMEDIATE attention, please go to your NEAREST Emergency Room. If you believe your problem can safely wait until you speak to a physician, please call Interventional Radiology for any concerns.    Please feel free to contact us at [phone] if any problems arise. After 6PM, Monday through Friday, on weekends and on holidays, please call [phone] and ask for the radiology resident on call to be paged.

## 2025-01-16 NOTE — H&P ADULT - NSICDXPASTMEDICALHX_GEN_ALL_CORE_FT
PAST MEDICAL HISTORY:  Atrial fibrillation     CAD (coronary artery disease)     Hypertension     Pancreatic cancer     Stented coronary artery x 1 stent

## 2025-01-16 NOTE — ED PROVIDER NOTE - PR
[9] : 9 [0] : 0 [Dull/Aching] : dull/aching [Sharp] : sharp [Stabbing] : stabbing [Throbbing] : throbbing [de-identified] : 4/27/23  return visit for this 76yo female presenting with right shoulder, left wrist and left knee pain. Reports on 4/22/23 she fell in her kitchen due to damp spots from rain. She states she slipped and fell. Fell directly onto right shoulder and had left knee and left wrist bruising as well. Taking Aleve with relief. No prior treatment for this injury. Pt states she has hardware in left wrist from previous surgery. \par \par PMH: Hx of OA rt knee. No prior rt shoulder pain. s/p ORIF Lt wrist fx [] : no [FreeTextEntry1] : right shoulder, left knee, left wrist  wnl

## 2025-01-16 NOTE — H&P ADULT - NSHPLABSRESULTS_GEN_ALL_CORE
11.1   4.70  )-----------( 126      ( 16 Jan 2025 17:22 )             34.5       01-16    136  |  102  |  10  ----------------------------<  75  4.9   |  21[L]  |  0.68    Ca    9.1      16 Jan 2025 17:22  Phos  3.1     01-16  Mg     2.2     01-16    TPro  6.4  /  Alb  3.4  /  TBili  1.3[H]  /  DBili  x   /  AST  64[H]  /  ALT  33  /  AlkPhos  762[H]  01-16    Troponin T, High Sensitivity Result: 28: ng/L (01.16.25 @ 17:22)  Pro-Brain Natriuretic Peptide: 393 pg/mL (01.16.25 @ 17:22)      - - - - - - - - - - - - - - - - - - - - - - - - - - - - - - - - - - - - - - - - - - - - - - - - - - - -       EKG PERSONALLY REVIEWED:  Sinus alberta 53 bpm     IMAGES PERSONALLY REVIEWED:     < from: Xray Chest 1 View- PORTABLE-Urgent (Xray Chest 1 View- PORTABLE-Urgent .) (01.16.25 @ 17:57) >  IMPRESSION: No focal consolidation.

## 2025-01-16 NOTE — H&P ADULT - PROBLEM SELECTOR PLAN 1
- Experienced near syncopal episode s/p paracentesis   - Afebrile, VSS, clinically nontoxic    - Possible etiology: orthostatic vs cardiac dz vs electrolyte abnorm vs infection   - EKG: Sinus alberta   - Orthostatic (likely): s/p paracentesis w/ 2.2L removed, could contribute to orthostasis. Check orthostatic bp, maintain adequate hydration, change position slowly  - Cardiac (possible): trop wnl, BNP minimal elevated (iso ascites). Tele monitor, check echo (ordered)   - Electrolyte (unlikely): review of electrolytes largely stable, trend labs  - Infection (unlikely): afebrile, no leukocytosis, low suspicion for infection, ctm

## 2025-01-16 NOTE — H&P ADULT - ASSESSMENT
69y M PMH HTN, HLD, Afib on Eliquis, CAD s/p 3 stents, metastatic pancreatic cancer with duodenal obstruction, recent admission at [facility] to surgery for duodenal obstruction s/p stent placement who presented to IR for therapeutic paracentesis (2.2 L removed) noted to have syncopal/near syncopal episode post para, code blue code called  now a/f for syncope w/u

## 2025-01-16 NOTE — H&P ADULT - PROBLEM SELECTOR PLAN 6
- Hx/o metastatic pancreatic cancer with duodenal obstruction  - S/p recent admission at [facility] to surgery for duodenal obstruction s/p stent placement  - Was following at [facility] where he received palliative chemo & radiation   - Despite poor prognosis, family maintains full code status for patient  - Would benefit from ongoing [facility] discussion, Palliative consulted - Encourage po intake   - Consider meal supplement   - Nutrition consult (ordered)

## 2025-01-16 NOTE — H&P ADULT - NSHPPHYSICALEXAM_GEN_ALL_CORE
T(C): 36.5 (01-16-25 @ 21:46), Max: 37.2 (01-16-25 @ 14:12)  HR: 66 (01-16-25 @ 21:46) (55 - 66)  BP: 149/93 (01-16-25 @ 21:46) (129/87 - 160/96)  RR: 18 (01-16-25 @ 21:46) (14 - 18)  SpO2: 98% (01-16-25 @ 21:46) (96% - 100%)    CONSTITUTIONAL: Cachectic, chronically ill appearing    EYES: PERRLA , EOMI  ENMT: MMM  RESP: No respiratory distress, no use of accessory muscles; CTA b/l  CV: +S1S2, RRR, no MRG;  trace LE edema   GI: Soft, NTND, s/p paracenteisis, drainage site C/D/I  MSK: spontaneously moves all extremities   PSYCH: A+O x 3

## 2025-01-16 NOTE — ED PROVIDER NOTE - PROGRESS NOTE DETAILS
Julio C PGY3: EKG remarkable for sinus bradycardia (consistent with previous) with no evidence of ischemic changes

## 2025-01-16 NOTE — ASU PATIENT PROFILE, ADULT - FALL HARM RISK - HARM RISK INTERVENTIONS

## 2025-01-16 NOTE — ASU PREOP CHECKLIST - SURGICAL CONSENT
Is This A New Presentation, Or A Follow-Up?: Follow Up George
What Type Of Rash Do You Have?: atopic dermatitis
How Severe Is It?: moderate
done

## 2025-01-16 NOTE — ED PROVIDER NOTE - ATTENDING CONTRIBUTION TO CARE
------------ATTENDING NOTE------------  pt w/ family (easily interpreting, both declined additional services) c/o feeling lightheaded / passing out / central chest ache after finishing IR paracentesis today -- complicated as pancreatic cancer, dehydrated, failure to thrive, d/w pt/family GOC is "new" conversation, c/w Palliative Service, pending full labs / imaging / close reassessments for tx / admission for continued casanova, optimize medical mgmt, outpt needs assessments.  - Piotr Sheriff MD   ---------------------------------------------------------

## 2025-01-16 NOTE — ED ADULT NURSE NOTE - ED STAT RN HANDOFF DETAILS
Report endorsed to oncoming Pogor  RN. Safety checks completed this shift. Safety rounds completed hourly.  IV sites checked Q2+remains WDL. Medications administered as ordered with no signs/symptoms of adverse reactions. Fall & skin precautions in place. Any issues endorsed to oncoming RN for follow up.

## 2025-01-16 NOTE — ED ADULT NURSE NOTE - IN ACCORDANCE WITH [place] STATE LAW, WE OFFER EVERY PATIENT A HEPATITIS C TEST. WOULD YOU LIKE TO BE TESTED TODAY?
Pt returned to room from CT. Pt parents at bedside. Pt tearful at this time.       Shruthi Stokes RN  09/27/20 0813 Opt out

## 2025-01-16 NOTE — ASU DISCHARGE PLAN (ADULT/PEDIATRIC) - NS MD DC FALL RISK RISK
For information on Fall & Injury Prevention, visit: [email or web address] OR  [email or web address] OR  https://www.cdc.gov/steadi/patient.html

## 2025-01-16 NOTE — ED ADULT NURSE REASSESSMENT NOTE - NS ED NURSE REASSESS COMMENT FT1
1930: Handoff taken from BOB Diana in pink. Introduced self to pt, resting in bed, VSS. Awaiting bed on tele floor, medications given, updated on POC. Comfort and safety maintained at this time.

## 2025-01-16 NOTE — PRE PROCEDURE NOTE - PRE PROCEDURE EVALUATION
Interventional Radiology    HPI: 69-year-old male with stage 4 pancreatic cancer with recent admission to [facility] for duodenal obstruction s/p stenting with recent CT demonstrating increased ascites. Pt presents today for therapeutic paracentesis.     Allergies: No Known Allergies    Medications (Abx/Cardiac/Anticoagulation/Blood Products)  acetaminophen 160 mg/5 mL oral suspension: 31.25 milliliter(s) orally every 8 hours as needed for  mild pain (16 Jan 2025 14:10)  amLODIPine 5 mg oral tablet: 1 tab(s) orally once a day (16 Jan 2025 14:10)  aspirin 81 mg oral tablet, chewable: 1 tab(s) orally once a day (16 Jan 2025 14:10)  atorvastatin 20 mg oral tablet: 1 tab(s) orally once a day (16 Jan 2025 14:10)  Digox 125 mcg (0.125 mg) oral tablet: 1 tab(s) orally once a day (16 Jan 2025 14:10)  Eliquis 5 mg oral tablet: 1 tab(s) orally 2 times a day Resume on SATURDAY JANUARY 4th (16 Jan 2025 14:10)  gabapentin 400 mg oral capsule: 1 cap(s) orally every 8 hours (16 Jan 2025 14:10)  isosorbide dinitrate 30 mg oral tablet: 1 tab(s) orally 2 times a day (16 Jan 2025 14:10)  lactulose 10 g/15 mL oral syrup: 15 milliliter(s) orally once a day (16 Jan 2025 14:10)  methadone 10 mg oral tablet: 1 tab(s) orally 3 times a day (16 Jan 2025 14:10)    Data:  176  57.2  T(C): 37.2  HR: 55  BP: 133/77  RR: 14  SpO2: 98%    Exam  General: No acute distress  Chest: Non labored breathing  Abdomen: Non-distended  Extremities: No swelling, warm    Imaging: Reviewed    Plan: 69y Male presents for therapeutic paracentesis.     -Risks/Benefits/alternatives explained with the patient and/or healthcare proxy and witnessed informed consent obtained.

## 2025-01-16 NOTE — ASU DISCHARGE PLAN (ADULT/PEDIATRIC) - NURSING INSTRUCTIONS
Please feel free to contact us at [phone] if any questions or concerns arise. After 6PM on Monday through Friday (and weekends and holidays) please call [phone] and ask for the radiology resident on call to be paged..

## 2025-01-16 NOTE — CHART NOTE - NSCHARTNOTEFT_GEN_A_CORE
After technically successful paracentesis, pt was discharged with VSS and rolled out on wheelchair.  As the patient was exiting the IR holding area, pt noticed to be lethargic however opened his eyes to deep sternal rub.  Pt rolled back into IR bay and code blue was called.   Initial vital signs were stable and further evaluated by code blue team.  Pt was transported to ED for further work up.    David Tse PA-C  IR call back ext. [phone]  Also available on Teams    - Non-emergent consults: Place IR consult order in [address]  - Emergent issues (pager): [facility] [phone]; [facility] [phone]; [phone]  - Scheduling questions: [facility] [phone]; [facility] [phone]  - Clinic/outpatient booking: [facility] [phone]; [facility] [phone]

## 2025-01-16 NOTE — H&P ADULT - PROBLEM SELECTOR PLAN 2
- Hx/o metastatic pancreatic cancer with duodenal obstruction  S/p recent admission at [facility] to surgery for duodenal obstruction s/p stent placement   - Was following at [facility] where he received palliative chemo & radiation   - S/p therapeutic paracentesis (2.2 L removed) today    - Pain control  - Outpatient f/u with Surg-Onc

## 2025-01-16 NOTE — ED ADULT TRIAGE NOTE - PAIN: PRESENCE, MLM
07/02/20 0046   Patient Data   PIP Observed (cm H2O) 36 cm H2O   MAP (cm H2O) 11   Auto/Intrinsic PEEP Observed (cm H2O) 1.5 cm H2O   Plateau Pressure (cm H2O) 18 cm H2O   Dynamic Compliance (L/cm H2O) 39.5 L/cm H2O   Airway Resistance 10.6   SpO2 96 %      denies pain/discomfort (Rating = 0)

## 2025-01-16 NOTE — ED PROVIDER NOTE - CARE PLAN
1 Principal Discharge DX:	Syncope and collapse  Secondary Diagnosis:	Moderate dehydration  Secondary Diagnosis:	Adult failure to thrive

## 2025-01-16 NOTE — H&P ADULT - PROBLEM SELECTOR PLAN 7
- Hx/o metastatic pancreatic cancer with duodenal obstruction  - S/p recent admission at [facility] to surgery for duodenal obstruction s/p stent placement  - Was following at [facility] where he received palliative chemo & radiation   - Despite poor prognosis, family maintains full code status for patient  - Would benefit from ongoing [facility] discussion, Palliative consulted

## 2025-01-17 DIAGNOSIS — E44.1 MILD PROTEIN-CALORIE MALNUTRITION: ICD-10-CM

## 2025-01-17 DIAGNOSIS — G89.3 NEOPLASM RELATED PAIN (ACUTE) (CHRONIC): ICD-10-CM

## 2025-01-17 DIAGNOSIS — R11.2 NAUSEA WITH VOMITING, UNSPECIFIED: ICD-10-CM

## 2025-01-17 DIAGNOSIS — R55 SYNCOPE AND COLLAPSE: ICD-10-CM

## 2025-01-17 DIAGNOSIS — Z51.5 ENCOUNTER FOR PALLIATIVE CARE: ICD-10-CM

## 2025-01-17 LAB
ANION GAP SERPL CALC-SCNC: 15 MMOL/L — SIGNIFICANT CHANGE UP (ref 5–17)
BUN SERPL-MCNC: 12 MG/DL — SIGNIFICANT CHANGE UP (ref 7–23)
CALCIUM SERPL-MCNC: 9.4 MG/DL — SIGNIFICANT CHANGE UP (ref 8.4–10.5)
CHLORIDE SERPL-SCNC: 100 MMOL/L — SIGNIFICANT CHANGE UP (ref 96–108)
CO2 SERPL-SCNC: 23 MMOL/L — SIGNIFICANT CHANGE UP (ref 22–31)
CREAT SERPL-MCNC: 0.68 MG/DL — SIGNIFICANT CHANGE UP (ref 0.5–1.3)
EGFR: 99 ML/MIN/1.73M2 — SIGNIFICANT CHANGE UP
GLUCOSE BLDC GLUCOMTR-MCNC: 115 MG/DL — HIGH (ref 70–99)
GLUCOSE BLDC GLUCOMTR-MCNC: 118 MG/DL — HIGH (ref 70–99)
GLUCOSE BLDC GLUCOMTR-MCNC: 119 MG/DL — HIGH (ref 70–99)
GLUCOSE BLDC GLUCOMTR-MCNC: 127 MG/DL — HIGH (ref 70–99)
GLUCOSE BLDC GLUCOMTR-MCNC: 90 MG/DL — SIGNIFICANT CHANGE UP (ref 70–99)
GLUCOSE SERPL-MCNC: 93 MG/DL — SIGNIFICANT CHANGE UP (ref 70–99)
HCT VFR BLD CALC: 33.5 % — LOW (ref 39–50)
HGB BLD-MCNC: 10.9 G/DL — LOW (ref 13–17)
INR BLD: 1.16 RATIO — SIGNIFICANT CHANGE UP (ref 0.85–1.16)
MCHC RBC-ENTMCNC: 30.8 PG — SIGNIFICANT CHANGE UP (ref 27–34)
MCHC RBC-ENTMCNC: 32.5 G/DL — SIGNIFICANT CHANGE UP (ref 32–36)
MCV RBC AUTO: 94.6 FL — SIGNIFICANT CHANGE UP (ref 80–100)
NRBC # BLD: 0 /100 WBCS — SIGNIFICANT CHANGE UP (ref 0–0)
NRBC BLD-RTO: 0 /100 WBCS — SIGNIFICANT CHANGE UP (ref 0–0)
PLATELET # BLD AUTO: 149 K/UL — LOW (ref 150–400)
POTASSIUM SERPL-MCNC: 4.5 MMOL/L — SIGNIFICANT CHANGE UP (ref 3.5–5.3)
POTASSIUM SERPL-SCNC: 4.5 MMOL/L — SIGNIFICANT CHANGE UP (ref 3.5–5.3)
PROTHROM AB SERPL-ACNC: 13.2 SEC — SIGNIFICANT CHANGE UP (ref 9.9–13.4)
RBC # BLD: 3.54 M/UL — LOW (ref 4.2–5.8)
RBC # FLD: 14.5 % — SIGNIFICANT CHANGE UP (ref 10.3–14.5)
SODIUM SERPL-SCNC: 138 MMOL/L — SIGNIFICANT CHANGE UP (ref 135–145)
WBC # BLD: 5.44 K/UL — SIGNIFICANT CHANGE UP (ref 3.8–10.5)
WBC # FLD AUTO: 5.44 K/UL — SIGNIFICANT CHANGE UP (ref 3.8–10.5)

## 2025-01-17 PROCEDURE — 99498 ADVNCD CARE PLAN ADDL 30 MIN: CPT

## 2025-01-17 PROCEDURE — 99223 1ST HOSP IP/OBS HIGH 75: CPT

## 2025-01-17 PROCEDURE — 99233 SBSQ HOSP IP/OBS HIGH 50: CPT

## 2025-01-17 PROCEDURE — 99497 ADVNCD CARE PLAN 30 MIN: CPT | Mod: 25

## 2025-01-17 RX ORDER — HYDROMORPHONE HYDROCHLORIDE 4 MG/ML
1.5 INJECTION, SOLUTION INTRAMUSCULAR; INTRAVENOUS; SUBCUTANEOUS EVERY 4 HOURS
Refills: 0 | Status: DISCONTINUED | OUTPATIENT
Start: 2025-01-17 | End: 2025-01-23

## 2025-01-17 RX ORDER — ONDANSETRON 4 MG/1
8 TABLET, ORALLY DISINTEGRATING ORAL EVERY 8 HOURS
Refills: 0 | Status: DISCONTINUED | OUTPATIENT
Start: 2025-01-17 | End: 2025-01-24

## 2025-01-17 RX ORDER — SODIUM CHLORIDE 9 G/ML
1000 INJECTION, SOLUTION INTRAVENOUS
Refills: 0 | Status: DISCONTINUED | OUTPATIENT
Start: 2025-01-17 | End: 2025-01-23

## 2025-01-17 RX ORDER — HYDROMORPHONE HYDROCHLORIDE 4 MG/ML
1 INJECTION, SOLUTION INTRAMUSCULAR; INTRAVENOUS; SUBCUTANEOUS EVERY 4 HOURS
Refills: 0 | Status: DISCONTINUED | OUTPATIENT
Start: 2025-01-17 | End: 2025-01-23

## 2025-01-17 RX ORDER — OLANZAPINE 10 MG/1
5 TABLET, FILM COATED ORAL
Refills: 0 | Status: DISCONTINUED | OUTPATIENT
Start: 2025-01-17 | End: 2025-01-24

## 2025-01-17 RX ADMIN — OLANZAPINE 5 MILLIGRAM(S): 10 TABLET, FILM COATED ORAL at 20:51

## 2025-01-17 RX ADMIN — HYDROMORPHONE HYDROCHLORIDE 1.5 MILLIGRAM(S): 4 INJECTION, SOLUTION INTRAMUSCULAR; INTRAVENOUS; SUBCUTANEOUS at 15:15

## 2025-01-17 RX ADMIN — ONDANSETRON 8 MILLIGRAM(S): 4 TABLET, ORALLY DISINTEGRATING ORAL at 15:20

## 2025-01-17 RX ADMIN — HYDROMORPHONE HYDROCHLORIDE 1 MILLIGRAM(S): 4 INJECTION, SOLUTION INTRAMUSCULAR; INTRAVENOUS; SUBCUTANEOUS at 11:44

## 2025-01-17 RX ADMIN — METHADONE HYDROCHLORIDE 10 MILLIGRAM(S): 5 SOLUTION ORAL at 20:51

## 2025-01-17 RX ADMIN — SODIUM CHLORIDE 60 MILLILITER(S): 9 INJECTION, SOLUTION INTRAVENOUS at 17:46

## 2025-01-17 RX ADMIN — HYDROMORPHONE HYDROCHLORIDE 1 MILLIGRAM(S): 4 INJECTION, SOLUTION INTRAMUSCULAR; INTRAVENOUS; SUBCUTANEOUS at 17:46

## 2025-01-17 RX ADMIN — Medication 30 MILLILITER(S): at 08:22

## 2025-01-17 RX ADMIN — HYDROMORPHONE HYDROCHLORIDE 1 MILLIGRAM(S): 4 INJECTION, SOLUTION INTRAMUSCULAR; INTRAVENOUS; SUBCUTANEOUS at 18:30

## 2025-01-17 RX ADMIN — ONDANSETRON 8 MILLIGRAM(S): 4 TABLET, ORALLY DISINTEGRATING ORAL at 20:51

## 2025-01-17 RX ADMIN — GABAPENTIN 400 MILLIGRAM(S): 800 TABLET ORAL at 20:51

## 2025-01-17 RX ADMIN — ONDANSETRON 4 MILLIGRAM(S): 4 TABLET, ORALLY DISINTEGRATING ORAL at 08:22

## 2025-01-17 RX ADMIN — METHADONE HYDROCHLORIDE 10 MILLIGRAM(S): 5 SOLUTION ORAL at 14:01

## 2025-01-17 RX ADMIN — HYDROMORPHONE HYDROCHLORIDE 1 MILLIGRAM(S): 4 INJECTION, SOLUTION INTRAMUSCULAR; INTRAVENOUS; SUBCUTANEOUS at 12:00

## 2025-01-17 NOTE — PROVIDER CONTACT NOTE (MEDICATION) - BACKGROUND
The Service to Family Practice order requested on 5/5/2017 has been removed as, we were unable to contact patient via telephone/mail to assist with scheduling.    Please contact patient, if further communication is needed.   Pt admitted for near syncope after a diagnostic paracentesis Pt admitted for near syncope after paracentesis

## 2025-01-17 NOTE — PHYSICAL THERAPY INITIAL EVALUATION ADULT - GAIT TRAINING, PT EVAL
GOAL: Patient will ambulate 50 feet independently with least restrictive assistive device in 4 weeks.

## 2025-01-17 NOTE — CONSULT NOTE ADULT - PROBLEM SELECTOR RECOMMENDATION 9
C as above, ongoing discussion pending clinical course  Cont med management  May consider transferring to PCU when bed available as medical boarder.   Daughter to come to hospital this evening  Maintain full code status for now.     Overall poor prognosis w high risk for continued clinical decline and in hospital death understood.   cont supportive care for pt and family  all questions answered in detail, support provided     Family already have referral for [facility] home hospice in place, if decision to return home.     We will follow

## 2025-01-17 NOTE — PROGRESS NOTE ADULT - PROBLEM SELECTOR PLAN 3
- at home takes Digoxin 125mcg qd, Eliquis 5mg bid but patient not able to tolerate much PO  - will hold eliquis  - continue digoxin if able to tolerate PO

## 2025-01-17 NOTE — CONSULT NOTE ADULT - BILLING PROVIDER USE ONLY
Attending or CHRISTIAN Only Rhombic Flap Text: The defect edges were debeveled with a #15 scalpel blade.  Given the location of the defect and the proximity to free margins a rhombic flap was deemed most appropriate.  Using a sterile surgical marker, an appropriate rhombic flap was drawn incorporating the defect.    The area thus outlined was incised deep to adipose tissue with a #15 scalpel blade.  The skin margins were undermined to an appropriate distance in all directions utilizing iris scissors.

## 2025-01-17 NOTE — PROVIDER CONTACT NOTE (MEDICATION) - SITUATION
Pt still unable to tolerate PO medications. Pt given PO methadone @1401 and pt had an episode of emesis 45 mins later.

## 2025-01-17 NOTE — PROGRESS NOTE ADULT - SUBJECTIVE AND OBJECTIVE BOX
[facility] Division of Hospital Medicine  Joanne Herrera MD  Available via MS Teams      SUBJECTIVE / OVERNIGHT EVENTS: patient seen and examined this morning. per discussion with patient's daughter (Kimberli), patient has had intermittent episodes of n/v at home but not progressively getting worse. unable to obtain ROS from the patient but patient endorsed epigastric/chest pain from ongoing n/v.       MEDICATIONS  (STANDING):  amLODIPine   Tablet 5 milliGRAM(s) Oral daily  aspirin  chewable 81 milliGRAM(s) Oral daily  dextrose 5% + sodium chloride 0.9%. 1000 milliLiter(s) (60 mL/Hr) IV Continuous <Continuous>  digoxin     Tablet 125 MICROGram(s) Oral daily  gabapentin 400 milliGRAM(s) Oral every 8 hours  lactulose Syrup 10 Gram(s) Oral daily  methadone    Tablet 10 milliGRAM(s) Oral three times a day  ondansetron Injectable 8 milliGRAM(s) IV Push every 8 hours    MEDICATIONS  (PRN):  acetaminophen     Tablet .. 650 milliGRAM(s) Oral every 6 hours PRN Temp greater or equal to 38C (100.4F), Mild Pain (1 - 3)  aluminum hydroxide/magnesium hydroxide/simethicone Suspension 30 milliLiter(s) Oral every 4 hours PRN Dyspepsia  HYDROmorphone  Injectable 1 milliGRAM(s) IV Push every 4 hours PRN Moderate Pain (4 - 6)  melatonin 3 milliGRAM(s) Oral at bedtime PRN Insomnia      I&O's Summary      PHYSICAL EXAM:  Vital Signs Last 24 Hrs  T(C): 36.8 (17 Jan 2025 04:38), Max: 37.2 (16 Jan 2025 14:12)  T(F): 98.2 (17 Jan 2025 04:38), Max: 98.9 (16 Jan 2025 14:12)  HR: 66 (17 Jan 2025 09:30) (55 - 66)  BP: 128/85 (17 Jan 2025 09:30) (113/77 - 160/96)  BP(mean): 101 (16 Jan 2025 19:30) (101 - 101)  RR: 18 (17 Jan 2025 04:38) (14 - 18)  SpO2: 97% (17 Jan 2025 09:30) (96% - 100%)    Parameters below as of 17 Jan 2025 09:30  Patient On (Oxygen Delivery Method): room air      CONSTITUTIONAL: cachectic with muscle wasting, very frail appearing  RESPIRATORY: Normal respiratory effort; lungs are clear to auscultation bilaterally  CARDIOVASCULAR: normal S1 and S2; No lower extremity edema  ABDOMEN: Nontender to palpation, normoactive bowel sounds  MUSCULOSKELETAL: no joint swelling or tenderness to palpation  PSYCH: alert and awake; calm      LABS:                        10.9   5.44  )-----------( 149      ( 17 Jan 2025 07:15 )             33.5     01-17    138  |  100  |  12  ----------------------------<  93  4.5   |  23  |  0.68    Ca    9.4      17 Jan 2025 07:15  Phos  3.1     01-16  Mg     2.2     01-16    TPro  6.4  /  Alb  3.4  /  TBili  1.3[H]  /  DBili  x   /  AST  64[H]  /  ALT  33  /  AlkPhos  762[H]  01-16    PT/INR - ( 17 Jan 2025 07:15 )   PT: 13.2 sec;   INR: 1.16 ratio         PTT - ( 16 Jan 2025 17:22 )  PTT:33.7 sec      Urinalysis Basic - ( 17 Jan 2025 07:15 )    Color: x / Appearance: x / SG: x / pH: x  Gluc: 93 mg/dL / Ketone: x  / Bili: x / Urobili: x   Blood: x / Protein: x / Nitrite: x   Leuk Esterase: x / RBC: x / WBC x   Sq Epi: x / Non Sq Epi: x / Bacteria: x        Culture - Fungal, Body Fluid (collected 16 Jan 2025 15:07)  Source: Peritoneal  Preliminary Report (17 Jan 2025 11:16):    Testing in progress    Culture - Body Fluid with Gram Stain (collected 16 Jan 2025 15:07)  Source: Peritoneal  Gram Stain (16 Jan 2025 22:51):    polymorphonuclear leukocytes seen    No organisms seen    by cytocentrifuge

## 2025-01-17 NOTE — PROVIDER CONTACT NOTE (OTHER) - ACTION/TREATMENT ORDERED:
Daughter advised by RN to not do it again going forward. ACP Rebeka Francisco @bedside aware.
Provider aware. EKG ordered. Pt states pain subsides when sitting upright. Will continue to monitor.
Provide aware. Pt given PRN zofran. Will reschedule morning medications. Will continue to monitor

## 2025-01-17 NOTE — CONSULT NOTE ADULT - PROBLEM SELECTOR RECOMMENDATION 4
cont home regimen: methadone 10mg po TID  repeat EKG to ensure QTC <500    IV Ofirmev 1G q8h PRN mild pain  cont dilaudid 1mg IV q4h PRN mod pain- 1.5mg IV q4h PRN severe pain     If able to tolerate po intake, can restart home dilaudid dose: 4mg po q4h PRN)

## 2025-01-17 NOTE — PROVIDER CONTACT NOTE (OTHER) - SITUATION
Pt's daughter gave patient home dose of gabapentin
Pt complaining of chest discomfort
Pt unable to tolerate PO medications or liquids. Pt having episodes of emesis throughout shift.

## 2025-01-17 NOTE — PHYSICAL THERAPY INITIAL EVALUATION ADULT - PERTINENT HX OF CURRENT PROBLEM, REHAB EVAL
69y M PMH HTN, HLD, Afib on Eliquis, CAD s/p 3 stents, metastatic pancreatic cancer with duodenal obstruction, recent admission at [facility] to surgery for duodenal obstruction s/p stent placement who presented to IR for therapeutic paracentesis (2.2 L removed) noted to have syncopal/near syncopal episode post para, code blue code called  now a/f for syncope w/u

## 2025-01-17 NOTE — PROVIDER CONTACT NOTE (OTHER) - ASSESSMENT
Pt A+Ox4. Fred speaking. Son at bedside. VSS.
Pt A+Ox 4. Manadarin speaking. Son at bedside. VSS.
Pt A&Ox4, family @bedside. Pt has not been tolerating PO medications all day. Family aware. Daughter still administered gabapentin to patient.

## 2025-01-17 NOTE — CONSULT NOTE ADULT - TIME BILLING
Total time spent including the following  [x] Physical chart review and documentation   An extensive review of the physical chart, electronic health record, and documentation was conducted to obtain collateral information including but not limited to:   - Current inpatient records (ED, H&P, primary team, and consultants [   ])   - Inpatient values/results (CBC, CMP, Imaging)   - Outpatient records   - Prior inpatient records (if available)   - Social work assessments   - Current or proposed treatment plans   - Pharmacotherapy review (including I-STOP if applicable)  []review of medical literature related to pathogenesis, disease/illness progress, treatment and/or prognosis  [x]care coordination  [x]discussion with the primary team  [x]discussion with floor staff  []discussion with consultant(s)  [x]discussion with the patient, surrogate decision maker, or family  [x]Physical Exam and/or review of systems   [x ]Formulation of assessment and plan   []Evaluating for response to treatment and side effects of opioids or benzodiazepines      The __50____ minutes spent in ACP (     50            ) were independent to the time spent in time based billing

## 2025-01-17 NOTE — PATIENT PROFILE ADULT - HISTORY OF COVID-19 VACCINATION
Where Is Your Eczema Located?: Hands and Feet
List Prescription Topical Steroids You Are Currently Using (Separate Each Name With A Comma):: Clobetasol ointment
Vaccine status unknown

## 2025-01-17 NOTE — PATIENT PROFILE ADULT - NSFALLSECTIONLABEL_GEN_A_CORE
. Minocycline Counseling: Patient advised regarding possible photosensitivity and discoloration of the teeth, skin, lips, tongue and gums.  Patient instructed to avoid sunlight, if possible.  When exposed to sunlight, patients should wear protective clothing, sunglasses, and sunscreen.  The patient was instructed to call the office immediately if the following severe adverse effects occur:  hearing changes, easy bruising/bleeding, severe headache, or vision changes.  The patient verbalized understanding of the proper use and possible adverse effects of minocycline.  All of the patient's questions and concerns were addressed.

## 2025-01-17 NOTE — PHYSICAL THERAPY INITIAL EVALUATION ADULT - PLANNED THERAPY INTERVENTIONS, PT EVAL
GOAL: Pt will negotiate up/down 7 steps with one handrail ascending using least restrictive assistive device, supervision assist in 4 weeks/balance training/bed mobility training/gait training/strengthening/transfer training

## 2025-01-17 NOTE — PROVIDER CONTACT NOTE (OTHER) - BACKGROUND
Pt admitted for further evaluation after near syncope after paracentesis
Pt admitted for syncopal episode after paracentesis. Pt has hx of metastatic pancreatic cancer with duodenal obstruction
PT admitted for syncopal episode and chest discomfort after paracentesis. Noted negative trops

## 2025-01-17 NOTE — PROGRESS NOTE ADULT - PROBLEM SELECTOR PLAN 2
- Hx unresectable metastatic pancreatic adenocarcinoma s/p multiple lines of therapy including FOLFIRINOX, chemoRT with Xeloda, and FOLFOX x2 cycles but stopped due to intolerance. Had a celiac plexus block as well per review of prior medical record.  - patient was following at [facility] but was informed no further treatment options.   - patient was also following with Palliative Care team at [facility] with Dr. Ramirez who has been managing his pain, was referred for home hospice ([facility]), but put on hold as patient was undergoing second opinion and evaluation with Dr. Amol Fernandez at [facility].   - patient with persistent n/v and likely epigastric pain as a result  - appreciate palliative input:   - start IV zofran 8mg q8h ATC, reglan 10mg IV q6h PRN refractory nausea, consider staring zyprexa 2.5mg po qhs if persistent.  - IV Ofirmev 1G q8h PRN mild pain, dilaudid 1mg IV q4h PRN mod pain, 1.5mg IV q4h PRN severe pain   - If able to tolerate po, can restart home dilaudid dose: 4mg po q4h PRN and continue methadone 10mg po TID - Hx unresectable metastatic pancreatic adenocarcinoma s/p multiple lines of therapy including FOLFIRINOX, chemoRT with Xeloda, and FOLFOX x2 cycles but stopped due to intolerance. Had a celiac plexus block as well per review of prior medical record.  - patient was following at [facility] but was informed no further treatment options.   - patient was also following with Palliative Care team at [facility] with Dr. Ramirez who has been managing his pain, was referred for home hospice ([facility]), but put on hold as patient was undergoing second opinion and evaluation with Dr. Amol Fernandez at [facility]. Patient is unlikely a candidate for further treatment.   - patient with persistent n/v and likely epigastric pain as a result  - appreciate palliative input:   - start IV zofran 8mg q8h ATC, start zyprexa 2.5mg po qhs   - IV Ofirmev 1G q8h PRN mild pain, dilaudid 1mg IV q4h PRN mod pain, 1.5mg IV q4h PRN severe pain   - If able to tolerate po, can restart home dilaudid dose: 4mg po q4h PRN and continue methadone 10mg po TID

## 2025-01-17 NOTE — CONSULT NOTE ADULT - PROBLEM SELECTOR RECOMMENDATION 3
refractory symptoms  cont zofran 8mg IV q8h  add reglan 10mg IV q6h PRN refractory nausea   may consider staring zyprexa 2.5mg po qhs if persistent

## 2025-01-17 NOTE — PHYSICAL THERAPY INITIAL EVALUATION ADULT - TRANSFER TRAINING, PT EVAL
GOAL: Patient will perform sit to stand transfers independently with least restrictive assistive device and proper hand placement in 4 weeks.

## 2025-01-17 NOTE — PROGRESS NOTE ADULT - TIME BILLING
reviewing medical record, evaluating the patient, discussing care plan with family and palliative attending, documenting in EMR reviewing medical record, evaluating the patient, discussing care plan with family and palliative attending, ordering medications, documenting in EMR.

## 2025-01-17 NOTE — CONSULT NOTE ADULT - ASSESSMENT
69y M w HTN, HLD, Afib on Eliquis, CAD s/p 3 stents, metastatic pancreatic cancer with duodenal obstruction, recent admission at [facility] to surgery for duodenal obstruction s/p stent placement  noted to have increased ascites so sent to IR suite today for therapeutic paracentesis (2.2 L removed) noted to have syncopal/near syncopal episode.     Pt with continued inability to tolerate PO intake, ongoing vomiting,  significantly debilitated with overall poor performance status.     Case dw Dr. Herrera at bedside.

## 2025-01-17 NOTE — CONSULT NOTE ADULT - PROBLEM SELECTOR RECOMMENDATION 5
cont nursing care for all ADLs  comfort feeds if/when tolerated w aspiration precautions  turn/reposition per nursing protocol  mouth care

## 2025-01-17 NOTE — PROGRESS NOTE ADULT - PROBLEM SELECTOR PLAN 5
- at home takes Isosorbide 30mg bid, amlodipine 5mg qd  - hold isosorbide, continue amlodipine if able to tolerate PO - at home takes Isosorbide 30mg bid, amlodipine 5mg qd  - hold isosorbide and amlodipine   - BP has been stable off meds

## 2025-01-17 NOTE — CONSULT NOTE ADULT - SUBJECTIVE AND OBJECTIVE BOX
Date of Service:01-17-25 @ 10:21  HPI:  Mandarin speaking, family at bedside translating per Pt/family preference     69y M PMH HTN, HLD, Afib on Eliquis, CAD s/p 3 stents, metastatic pancreatic cancer with duodenal obstruction, recent admission at [facility] to surgery for duodenal obstruction s/p stent placement  noted to have increased ascites so sent to IR suite today for therapeutic paracentesis (2.2 L removed) noted to have syncopal/near syncopal episode post para so code blue code called and pt was sent to ED for further eval.  Patient had completed paracentesis, had been discharged from IR and was being wheeled out by daughter when it was noticed that patient was complaining of chest discomfort and became unresponsive, though no loss of consciousness.  CODE BLUE was called and patient was brought to the emergency department for further evaluation.  Family reports that patient is not being offered chemotherapy by [facility] due to metastasis.  Despite poor prognosis, family maintains full code status for patient.   At time of encounter pt frail appearing, endorsing nausea, recently given zofran. family (son & daughter) at bedside     ROS: Denies HA, SOB, palpitation, N/V/D, fever, cough, chills, dizziness, sick contact, change in bowel or urinary habits   A 10-system ROS was performed and is negative except as noted above and/or in the HPI.    ED: IVF, Gabapentin, Methadone, placed on tele. Trop neg, BNP minima elevated. CXR neg  (16 Jan 2025 20:14)      Pt seen in ED, wife and son at bedside (Mandarin speaking), daughter Kimberli via telephone to assist in translation per family request. Above history confirmed.   Pt vomiting bilious fluid at time of visit, has not been able to tolerate po intake since yesterday. Daughter reports nausea is constant. Pain has remains well controlled on home regimen: methadone 10mg TID, dilaudid 4mg po q4h PRN (minimally utilizes)  Pt     Further collateral obtained from daughter:  Pt follows w Palliative Care team at [facility] w Dr. Ramirez who has been managing pt's pain.   Was told by [facility] no further treatment options, referred for home Hospice ([facility]), family made appointment but then cancelled as pt wanted to seek a second opinion.  Has follow up appointment w [facility] ([facility]) scheduled for 1/28 for second opinion (third appointment).        PERTINENT PM/SXH:   Hypertension    CAD (coronary artery disease)    Stented coronary artery    Pancreatic cancer    Atrial fibrillation      Stented coronary artery      FAMILY HISTORY:    Family Hx substance abuse [ ]yes [ ]no  ITEMS NOT CHECKED ARE NOT PRESENT    SOCIAL HISTORY:   Significant other/partner[ x]  Children[x ]  Voodoo/Spirituality:  Substance hx:  [ ]   Tobacco hx:  [ ]   Alcohol hx: [ ]   Home Opioid hx:  [x ] I-Stop Reference No: [id number]  Living Situation: [x ]Home  [ ]Long term care  [ ]Rehab [ ]Other    ADVANCE DIRECTIVES:    DNR/MOLST  [ ]  Living Will  [ ]   DECISION MAKER(s):  [ ] Health Care Proxy(s)  [ x] Surrogate(s)  [ ] Guardian           Name(s): Phone Number(s): Kimberli, number in chart     BASELINE (I)ADL(s) (prior to admission):  [place]: [ ]Total  [ ] Moderate [x ]Dependent    Allergies    No Known Allergies    Intolerances    MEDICATIONS  (STANDING):  amLODIPine   Tablet 5 milliGRAM(s) Oral daily  apixaban 5 milliGRAM(s) Oral two times a day  aspirin  chewable 81 milliGRAM(s) Oral daily  atorvastatin 20 milliGRAM(s) Oral at bedtime  dextrose 5% + sodium chloride 0.9%. 1000 milliLiter(s) (60 mL/Hr) IV Continuous <Continuous>  digoxin     Tablet 125 MICROGram(s) Oral daily  gabapentin 400 milliGRAM(s) Oral every 8 hours  isosorbide   dinitrate Tablet (ISORDIL) 30 milliGRAM(s) Oral two times a day  lactulose Syrup 10 Gram(s) Oral daily  methadone    Tablet 10 milliGRAM(s) Oral three times a day    MEDICATIONS  (PRN):  acetaminophen     Tablet .. 650 milliGRAM(s) Oral every 6 hours PRN Temp greater or equal to 38C (100.4F), Mild Pain (1 - 3)  aluminum hydroxide/magnesium hydroxide/simethicone Suspension 30 milliLiter(s) Oral every 4 hours PRN Dyspepsia  melatonin 3 milliGRAM(s) Oral at bedtime PRN Insomnia  ondansetron Injectable 4 milliGRAM(s) IV Push every 8 hours PRN Nausea and/or Vomiting    PRESENT SYMPTOMS: [ ]Unable to self-report  [ ] CPOT [ ] PAINADs [ ] RDOS  Source if other than patient:  [ ]Family   [ ]Team     Pain: [ ]yes [ ]no  QOL impact -   Location -                    Aggravating factors -  Quality -  Radiation -  Timing-  Severity (0-10 scale):  Minimal acceptable level (0-10 scale):     CPOT:    https://www.[facility].org/getattachment/[email or web address]-[email or web address]/Critical-Care-Pain-Observation-Tool-(CPOT)    PAINAD Score: See PAINAD tool and score below     Dyspnea:                           [ ]Mild [ ]Moderate [ ]Severe    RDOS: See RDOS tool and score below   0 to 2  minimal or no respiratory distress   3  mild distress  4 to 6 moderate distress  >7 severe distress      Anxiety:                             [ ]Mild [ ]Moderate [ ]Severe  Fatigue:                             [ ]Mild [ ]Moderate [ ]Severe  Nausea:                             [ ]Mild [ ]Moderate [ ]Severe  Loss of appetite:              [ ]Mild [ ]Moderate [ ]Severe  Constipation:                    [ ]Mild [ ]Moderate [ ]Severe    PCSSQ[Palliative Care Spiritual Screening Question]   Severity (0-10):  Score of 4 or > indicate consideration of Chaplaincy referral.  Chaplaincy Referral: [ ] yes [ ] refused [ ] following [ ] Deferred     Caregiver [place]? : [ ] yes [ ] no [ ] Deferred [ ] Declined             Social work referral [ ] Patient & Family Centered Care Referral [ ]     Anticipatory Grief present?:  [ ] yes [ ] no  [ ] Deferred                  Social work referral [ ] Chaplaincy Referral [ ]    		  Other Symptoms:  [ ]All other review of systems negative     Palliative Performance Status Version 2:   See PPSv2 tool and score below          PHYSICAL EXAM:  Vital Signs Last 24 Hrs  T(C): 36.8 (17 Jan 2025 04:38), Max: 37.2 (16 Jan 2025 14:12)  T(F): 98.2 (17 Jan 2025 04:38), Max: 98.9 (16 Jan 2025 14:12)  HR: 66 (17 Jan 2025 09:30) (55 - 66)  BP: 128/85 (17 Jan 2025 09:30) (113/77 - 160/96)  BP(mean): 101 (16 Jan 2025 19:30) (101 - 101)  RR: 18 (17 Jan 2025 04:38) (14 - 18)  SpO2: 97% (17 Jan 2025 09:30) (96% - 100%)    Parameters below as of 17 Jan 2025 09:30  Patient On (Oxygen Delivery Method): room air     I&O's Summary    GENERAL: [ ]Cachexia    [ ]Alert  [ ]Oriented x   [ ]Lethargic  [ ]Unarousable  [ ]Verbal  [ ]Non-Verbal  Behavioral:   [ ] Anxiety  [ ] Delirium [ ] Agitation [ ] Other  HEENT:  [ ]Normal   [ ]Dry mouth   [ ]ET Tube/Trach  [ ]Oral lesions  PULMONARY:   [ ]Clear [ ]Tachypnea  [ ]Audible excessive secretions   [ ]Rhonchi        [ ]Right [ ]Left [ ]Bilateral  [ ]Crackles        [ ]Right [ ]Left [ ]Bilateral  [ ]Wheezing     [ ]Right [ ]Left [ ]Bilateral  [ ]Diminished breath sounds [ ]right [ ]left [ ]bilateral  CARDIOVASCULAR:    [ ]Regular [ ]Irregular [ ]Tachy  [ ]Ilya [ ]Murmur [ ]Other  GASTROINTESTINAL:  [ ]Soft  [ ]Distended   [ ]+BS  [ ]Non tender [ ]Tender  [ ]Other [ ]PEG [ ]OGT/ NGT  Last BM:  GENITOURINARY:  [ ]Normal [ ] Incontinent   [ ]Oliguria/Anuria   [ ]Hand  MUSCULOSKELETAL:   [ ]Normal   [ ]Weakness  [ ]Bed/Wheelchair bound [ ]Edema  NEUROLOGIC:   [ ]No focal deficits  [ ]Cognitive impairment  [ ]Dysphagia [ ]Dysarthria [ ]Paresis [ ]Other   SKIN:   [ ]Normal  [ ]Rash  [ ]Other  [ ]Pressure ulcer(s)       Present on admission [ ]y [ ]n    CRITICAL CARE:  [ ] Shock Present  [ ]Septic [ ]Cardiogenic [ ]Neurologic [ ]Hypovolemic  [ ]  Vasopressors [ ]  Inotropes   [ ]Respiratory failure present [ ]Mechanical ventilation [ ]Non-invasive ventilatory support [ ]High flow    [ ]Acute  [ ]Chronic [ ]Hypoxic  [ ]Hypercarbic [ ]Other  [ ]Other organ failure     LABS:                        10.9   5.44  )-----------( 149      ( 17 Jan 2025 07:15 )             33.5   01-17    138  |  100  |  12  ----------------------------<  93  4.5   |  23  |  0.68    Ca    9.4      17 Jan 2025 07:15  Phos  3.1     01-16  Mg     2.2     01-16    TPro  6.4  /  Alb  3.4  /  TBili  1.3[H]  /  DBili  x   /  AST  64[H]  /  ALT  33  /  AlkPhos  762[H]  01-16  PT/INR - ( 17 Jan 2025 07:15 )   PT: 13.2 sec;   INR: 1.16 ratio         PTT - ( 16 Jan 2025 17:22 )  PTT:33.7 sec    Urinalysis Basic - ( 17 Jan 2025 07:15 )    Color: x / Appearance: x / SG: x / pH: x  Gluc: 93 mg/dL / Ketone: x  / Bili: x / Urobili: x   Blood: x / Protein: x / Nitrite: x   Leuk Esterase: x / RBC: x / WBC x   Sq Epi: x / Non Sq Epi: x / Bacteria: x      RADIOLOGY & ADDITIONAL STUDIES:    PROTEIN CALORIE MALNUTRITION PRESENT: [ ]mild [ ]moderate [ ]severe [ ]underweight [ ]morbid obesity  https://www.andeal.org/vault/2440/web/files/ONC/Table_Clinical%20Characteristics%20to%20Document%20Malnutrition-White%20JV%20et%20al%202012.pdf    Height (cm): 176 (01-16-25 @ 16:19), 176 (01-16-25 @ 14:12), 174.9 (01-10-25 @ 22:38)  Weight (kg): 57.2 (01-16-25 @ 16:19), 57.2 (01-16-25 @ 14:12), 54 (01-10-25 @ 22:38)  BMI (kg/m2): 18.5 (01-16-25 @ 16:19), 18.5 (01-16-25 @ 14:12), 17.7 (01-10-25 @ 22:38)    [ ]PPSV2 < or = to 30% [ ]significant weight loss  [ ]poor nutritional intake  [ ]anasarca[ ]Artificial Nutrition      Other REFERRALS:  [ ]Hospice  [ ]Child Life  [ ]Social Work  [ ]Case management [ ]Holistic Therapy     Goals of Care Document:  Date of Service:01-17-25 @ 10:21  HPI:  Mandarin speaking, family at bedside translating per Pt/family preference     69y M PMH HTN, HLD, Afib on Eliquis, CAD s/p 3 stents, metastatic pancreatic cancer with duodenal obstruction, recent admission at [facility] to surgery for duodenal obstruction s/p stent placement  noted to have increased ascites so sent to IR suite today for therapeutic paracentesis (2.2 L removed) noted to have syncopal/near syncopal episode post para so code blue code called and pt was sent to ED for further eval.  Patient had completed paracentesis, had been discharged from IR and was being wheeled out by daughter when it was noticed that patient was complaining of chest discomfort and became unresponsive, though no loss of consciousness.  CODE BLUE was called and patient was brought to the emergency department for further evaluation.  Family reports that patient is not being offered chemotherapy by [facility] due to metastasis.  Despite poor prognosis, family maintains full code status for patient.   At time of encounter pt frail appearing, endorsing nausea, recently given zofran. family (son & daughter) at bedside     ROS: Denies HA, SOB, palpitation, N/V/D, fever, cough, chills, dizziness, sick contact, change in bowel or urinary habits   A 10-system ROS was performed and is negative except as noted above and/or in the HPI.    ED: IVF, Gabapentin, Methadone, placed on tele. Trop neg, BNP minima elevated. CXR neg  (16 Jan 2025 20:14)      Pt seen in ED, wife and son at bedside (Mandarin speaking), daughter Kimberli via telephone to assist in translation per family request. Above history confirmed.   Pt vomiting bilious fluid at time of visit, has not been able to tolerate po intake since yesterday. Daughter reports nausea is constant despite receipt of zofran 4mg IV. Pain has remained well controlled on home regimen: methadone 10mg TID, dilaudid 4mg po q4h PRN (minimally utilizes)  Pt is no longer ambulatory.     Further collateral obtained from daughter:  Pt follows w Palliative Care team at [facility] w Dr. Ramirez who has been managing pt's pain.   Was told by [facility] no further treatment options, referred for home Hospice ([facility]), family made appointment but then cancelled as pt wanted to seek a second opinion.  Has follow up appointment w [facility]) scheduled for 1/28 for second opinion (third appointment).        PERTINENT PM/SXH:   Hypertension    CAD (coronary artery disease)    Stented coronary artery    Pancreatic cancer    Atrial fibrillation      Stented coronary artery      FAMILY HISTORY:    Family Hx substance abuse [ ]yes [ ]no  ITEMS NOT CHECKED ARE NOT PRESENT    SOCIAL HISTORY:   Significant other/partner[ x]  Children[x ]  Mosque/Spirituality:  Substance hx:  [ ]   Tobacco hx:  [ ]   Alcohol hx: [ ]   Home Opioid hx:  [x ] I-Stop Reference No: [id number]  Living Situation: [x ]Home  [ ]Long term care  [ ]Rehab [ ]Other    ADVANCE DIRECTIVES:    DNR/MOLST  [ ]  Living Will  [ ]   DECISION MAKER(s):  [ ] Health Care Proxy(s)  [ x] Surrogate(s)  [ ] Guardian           Name(s): Phone Number(s): Kimberli, number in chart     BASELINE (I)ADL(s) (prior to admission):  [place]: [ ]Total  [ ] Moderate [x ]Dependent    Allergies    No Known Allergies    Intolerances    MEDICATIONS  (STANDING):  amLODIPine   Tablet 5 milliGRAM(s) Oral daily  apixaban 5 milliGRAM(s) Oral two times a day  aspirin  chewable 81 milliGRAM(s) Oral daily  atorvastatin 20 milliGRAM(s) Oral at bedtime  dextrose 5% + sodium chloride 0.9%. 1000 milliLiter(s) (60 mL/Hr) IV Continuous <Continuous>  digoxin     Tablet 125 MICROGram(s) Oral daily  gabapentin 400 milliGRAM(s) Oral every 8 hours  isosorbide   dinitrate Tablet (ISORDIL) 30 milliGRAM(s) Oral two times a day  lactulose Syrup 10 Gram(s) Oral daily  methadone    Tablet 10 milliGRAM(s) Oral three times a day    MEDICATIONS  (PRN):  acetaminophen     Tablet .. 650 milliGRAM(s) Oral every 6 hours PRN Temp greater or equal to 38C (100.4F), Mild Pain (1 - 3)  aluminum hydroxide/magnesium hydroxide/simethicone Suspension 30 milliLiter(s) Oral every 4 hours PRN Dyspepsia  melatonin 3 milliGRAM(s) Oral at bedtime PRN Insomnia  ondansetron Injectable 4 milliGRAM(s) IV Push every 8 hours PRN Nausea and/or Vomiting    PRESENT SYMPTOMS: [ ]Unable to self-report  [ ] CPOT [ ] PAINADs [ ] RDOS  Source if other than patient:  [x ]Family   [ ]Team     Pain: [x ]yes [ ]no  QOL impact -   Location - chest, related to continued retching                    Aggravating factors -  Quality -  Radiation -  Timing-  Severity (0-10 scale):  Minimal acceptable level (0-10 scale):     CPOT:    https://www.[facility].org/getattachment/[email or web address]/Critical-Care-Pain-Observation-Tool-(CPOT)    PAINAD Score: See PAINAD tool and score below     Dyspnea:                           [ ]Mild [ ]Moderate [ ]Severe    RDOS: See RDOS tool and score below   0 to 2  minimal or no respiratory distress   3  mild distress  4 to 6 moderate distress  >7 severe distress      Anxiety:                             [ ]Mild [ ]Moderate [ ]Severe  Fatigue:                             [ ]Mild [ ]Moderate [x ]Severe  Nausea:                             [ ]Mild [ ]Moderate [ ]Severe  Loss of appetite:              [ ]Mild [ ]Moderate [ x]Severe  Constipation:                    [ ]Mild [ ]Moderate [ ]Severe    PCSSQ[Palliative Care Spiritual Screening Question]   Severity (0-10):  Score of 4 or > indicate consideration of Chaplaincy referral.  Chaplaincy Referral: [ ] yes [ ] refused [ ] following [ ] Deferred     Caregiver [place]? : [ ] yes [ ] no [ ] Deferred [ ] Declined             Social work referral [ ] Patient & Family Centered Care Referral [ ]     Anticipatory Grief present?:  [ ] yes [ ] no  [ ] Deferred                  Social work referral [ ] Chaplaincy Referral [ ]    		  Other Symptoms:  [x ]All other review of systems negative     Palliative Performance Status Version 2:   See PPSv2 tool and score below          PHYSICAL EXAM:  Vital Signs Last 24 Hrs  T(C): 36.8 (17 Jan 2025 04:38), Max: 37.2 (16 Jan 2025 14:12)  T(F): 98.2 (17 Jan 2025 04:38), Max: 98.9 (16 Jan 2025 14:12)  HR: 66 (17 Jan 2025 09:30) (55 - 66)  BP: 128/85 (17 Jan 2025 09:30) (113/77 - 160/96)  BP(mean): 101 (16 Jan 2025 19:30) (101 - 101)  RR: 18 (17 Jan 2025 04:38) (14 - 18)  SpO2: 97% (17 Jan 2025 09:30) (96% - 100%)    Parameters below as of 17 Jan 2025 09:30  Patient On (Oxygen Delivery Method): room air     I&O's Summary    GENERAL: [x ]Cachexia  , actively vomiting   [ ]Alert  [x ]Oriented x 3   [x ]Lethargic  [ ]Unarousable  [x ]Verbal  [ ]Non-Verbal  Behavioral:   [ ] Anxiety  [ ] Delirium [ ] Agitation [ ] Other  HEENT:  [x ]Normal   [ ]Dry mouth   [ ]ET Tube/Trach  [ ]Oral lesions  PULMONARY:   [ ]Clear [ ]Tachypnea  [ ]Audible excessive secretions   [ ]Rhonchi        [ ]Right [ ]Left [ ]Bilateral  [ ]Crackles        [ ]Right [ ]Left [ ]Bilateral  [ ]Wheezing     [ ]Right [ ]Left [ ]Bilateral  [ ]Diminished breath sounds [ ]right [ ]left [ ]bilateral  CARDIOVASCULAR:    [x ]Regular [ ]Irregular [ ]Tachy  [ ]Ilya [ ]Murmur [ ]Other  GASTROINTESTINAL:  [x ]Soft  [ ]Distended   [ ]+BS  [ ]Non tender [ ]Tender  [ ]Other [ ]PEG [ ]OGT/ NGT  Last BM:  GENITOURINARY:  [ ]Normal [ ] Incontinent   [ ]Oliguria/Anuria   [ ]Hand  MUSCULOSKELETAL:   [ ]Normal   [ ]Weakness  [x ]Bed/Wheelchair bound [ ]Edema  NEUROLOGIC:   [ x]No focal deficits  [ ]Cognitive impairment  [ ]Dysphagia [ ]Dysarthria [ ]Paresis [ ]Other   SKIN:   [ ]Normal  [ ]Rash  [ ]Other  [ ]Pressure ulcer(s)       Present on admission [ ]y [ ]n    CRITICAL CARE:  [ ] Shock Present  [ ]Septic [ ]Cardiogenic [ ]Neurologic [ ]Hypovolemic  [ ]  Vasopressors [ ]  Inotropes   [ ]Respiratory failure present [ ]Mechanical ventilation [ ]Non-invasive ventilatory support [ ]High flow    [ ]Acute  [ ]Chronic [ ]Hypoxic  [ ]Hypercarbic [ ]Other  [ ]Other organ failure     LABS:                        10.9   5.44  )-----------( 149      ( 17 Jan 2025 07:15 )             33.5   01-17    138  |  100  |  12  ----------------------------<  93  4.5   |  23  |  0.68    Ca    9.4      17 Jan 2025 07:15  Phos  3.1     01-16  Mg     2.2     01-16    TPro  6.4  /  Alb  3.4  /  TBili  1.3[H]  /  DBili  x   /  AST  64[H]  /  ALT  33  /  AlkPhos  762[H]  01-16  PT/INR - ( 17 Jan 2025 07:15 )   PT: 13.2 sec;   INR: 1.16 ratio         PTT - ( 16 Jan 2025 17:22 )  PTT:33.7 sec    Urinalysis Basic - ( 17 Jan 2025 07:15 )    Color: x / Appearance: x / SG: x / pH: x  Gluc: 93 mg/dL / Ketone: x  / Bili: x / Urobili: x   Blood: x / Protein: x / Nitrite: x   Leuk Esterase: x / RBC: x / WBC x   Sq Epi: x / Non Sq Epi: x / Bacteria: x      RADIOLOGY & ADDITIONAL STUDIES: < from: CT Abdomen and Pelvis w/ IV Cont (01.11.25 @ 02:27) >  IMPRESSION:  1.   Interval placement of a common bile duct stent.  2.   Stable ill-defined infiltrating soft tissue mass in the elvira hepatis  encasing and constricting main portal vein. Persistent intrahepatic   biliary  ductal dilatation particularly in the left hepatic lobe to the level of   the  elvira hepatis.  3.   Interval placement a stent in the pylorus bridging circumferential  infiltrating pyloric mass.  4.   Stable infiltrating pancreatic neoplasm.  5.   Peritoneal carcinomatosis and omental caking. Complex probably   loculated  moderate ascites.  6. Nonocclusive thrombus versus flow artifact in the SMV and portal   confluence.        PROTEIN CALORIE MALNUTRITION PRESENT: [ ]mild [ ]moderate [x ]severe [ ]underweight [ ]morbid obesity  https://www.andeal.org/vault/9787/web/files/ONC/Table_Clinical%20Characteristics%20to%20Document%20Malnutrition-White%20JV%20et%20al%202012.pdf    Height (cm): 176 (01-16-25 @ 16:19), 176 (01-16-25 @ 14:12), 174.9 (01-10-25 @ 22:38)  Weight (kg): 57.2 (01-16-25 @ 16:19), 57.2 (01-16-25 @ 14:12), 54 (01-10-25 @ 22:38)  BMI (kg/m2): 18.5 (01-16-25 @ 16:19), 18.5 (01-16-25 @ 14:12), 17.7 (01-10-25 @ 22:38)    [x ]PPSV2 < or = to 30% [x ]significant weight loss  [ x]poor nutritional intake  [ ]anasarca[ ]Artificial Nutrition      Other REFERRALS:  [x ]Hospice  [ ]Child Life  [ ]Social Work  [ ]Case management [ ]Holistic Therapy     Goals of Care Document:

## 2025-01-17 NOTE — PATIENT PROFILE ADULT - NSPROEXTENSIONSOFSELF_GEN_A_NUR
"2023       No Recipients    Patient: Sandra Justice   YOB: 1994   Date of Visit: 2023       Dear NAJMA Ballard,    Thank you for referring Sandra Justice to me for evaluation. Below is a copy of my consult note.    If you have questions, please do not hesitate to call me. I look forward to following Sandra along with you.         Sincerely,        Taryn Kim MD        CC:   No Recipients    Patient denies bleeding, leaking fluid or contractions  NIPT negative  Patients next follow up with JAGUAR YAO is 2023        Maternal/Fetal Medicine Follow Up Note     Name: Sandra Justice    : 1994     MRN: [id number]     Referring Provider: EUSEBIO Ballard*    Chief Complaint  AC lag    Subjective     History of Present Illness:  Sandra Justice is a 29 y.o.  35w5d who presents today for follow up in the setting of right renal agenesis, concern for AC lag, history of 15 week loss, history of substance abuse   Overall well with no interval issues     JANNA: Estimated Date of Delivery: 23     ROS:   As noted in HPI.     Objective     Vital Signs  /74   Wt 93 kg (205 lb)   LMP 2023 (Approximate)   Estimated body mass index is 36.31 kg/m² as calculated from the following:    Height as of 23: 160 cm (63\").    Weight as of this encounter: 93 kg (205 lb).    Ultrasound Impression:   See viewpoint    Assessment and Plan     Sandra Justice is a 29 y.o.  35w5d     Diagnoses and all orders for this visit:    1. Unilateral renal agenesis (Primary)  Assessment & Plan:  Again noted right renal agenesis with normal appearing left kidney. Normal fluid    pediatric evaluation recommended.         2. History of unexplained stillbirth    3. History of substance abuse    4. Chronic hepatitis C complicating pregnancy, antepartum     5. AC lag - not noted today. Appropriate fetal growth     Follow Up  No follow-ups on file.    I spent 30 " minutes caring for the patient on the day of service. This included: obtaining or reviewing a separately obtained medical history, reviewing patient records, performing a medically appropriate exam and/or evaluation, counseling or educating the patient/family/caregiver, ordering medications, labs, and/or procedures and documenting such in the medical record. This does not include time spent on review and interpretation of other tests such as fetal ultrasound or the performance of other procedures such as amniocentesis or CVS.    Taryn Kim MD FACOG  Maternal Fetal Medicine, [facility]     2023   none

## 2025-01-17 NOTE — PHYSICAL THERAPY INITIAL EVALUATION ADULT - ADDITIONAL COMMENTS
lives with family in house with 7-8 BLANCO, 1st floor set up; has cane, RW and wheelchair at home; assist from [facility] (50 hours/week) and family at baseline with all mobility/ADLs

## 2025-01-17 NOTE — PATIENT PROFILE ADULT - FALL HARM RISK - HARM RISK INTERVENTIONS

## 2025-01-17 NOTE — PROGRESS NOTE ADULT - PROBLEM SELECTOR PLAN 7
- long GOC discussion had with the daughter (Kimberli) regarding the patient's current condition, trajectory of his care, code status, consideration of shifting focus on comfort and hospice.   - Kimberli will discuss further with rest of her family regarding above but for now, in agreement with focusing on comfort and symptom management (i.e., no labs, no NG tube, no CT a/p)  - will limit unnecessary PO medications as patient is not able to tolerate PO much - long GOC discussion had with the daughter (Kimberli) regarding the patient's current condition, trajectory of his care, code status, consideration of shifting focus on comfort and hospice (see GOC note above).  - Kimberli will discuss further with rest of her family regarding above but for now, in agreement with focusing on comfort and symptom management (i.e., no labs, no NG tube, no CT a/p)  - will limit unnecessary PO medications as patient is not able to tolerate PO much  - will need ongoing GOC discussion regarding code status and hospice  - appreciated palliative team's assistance.

## 2025-01-18 ENCOUNTER — TRANSCRIPTION ENCOUNTER (OUTPATIENT)
Age: 70
End: 2025-01-18

## 2025-01-18 LAB
ALBUMIN SERPL ELPH-MCNC: 2.9 G/DL — LOW (ref 3.3–5)
ALP SERPL-CCNC: 593 U/L — HIGH (ref 40–120)
ALT FLD-CCNC: 24 U/L — SIGNIFICANT CHANGE UP (ref 10–45)
ANION GAP SERPL CALC-SCNC: 7 MMOL/L — SIGNIFICANT CHANGE UP (ref 5–17)
AST SERPL-CCNC: 40 U/L — SIGNIFICANT CHANGE UP (ref 10–40)
BILIRUB SERPL-MCNC: 1.1 MG/DL — SIGNIFICANT CHANGE UP (ref 0.2–1.2)
BUN SERPL-MCNC: 13 MG/DL — SIGNIFICANT CHANGE UP (ref 7–23)
CALCIUM SERPL-MCNC: 8.9 MG/DL — SIGNIFICANT CHANGE UP (ref 8.4–10.5)
CHLORIDE SERPL-SCNC: 104 MMOL/L — SIGNIFICANT CHANGE UP (ref 96–108)
CO2 SERPL-SCNC: 29 MMOL/L — SIGNIFICANT CHANGE UP (ref 22–31)
CREAT SERPL-MCNC: 0.75 MG/DL — SIGNIFICANT CHANGE UP (ref 0.5–1.3)
EGFR: 96 ML/MIN/1.73M2 — SIGNIFICANT CHANGE UP
GLUCOSE SERPL-MCNC: 152 MG/DL — HIGH (ref 70–99)
HCT VFR BLD CALC: 30.8 % — LOW (ref 39–50)
HGB BLD-MCNC: 10.3 G/DL — LOW (ref 13–17)
MAGNESIUM SERPL-MCNC: 2.2 MG/DL — SIGNIFICANT CHANGE UP (ref 1.6–2.6)
MCHC RBC-ENTMCNC: 31.3 PG — SIGNIFICANT CHANGE UP (ref 27–34)
MCHC RBC-ENTMCNC: 33.4 G/DL — SIGNIFICANT CHANGE UP (ref 32–36)
MCV RBC AUTO: 93.6 FL — SIGNIFICANT CHANGE UP (ref 80–100)
NRBC # BLD: 0 /100 WBCS — SIGNIFICANT CHANGE UP (ref 0–0)
NRBC BLD-RTO: 0 /100 WBCS — SIGNIFICANT CHANGE UP (ref 0–0)
PLATELET # BLD AUTO: 144 K/UL — LOW (ref 150–400)
POTASSIUM SERPL-MCNC: 3.9 MMOL/L — SIGNIFICANT CHANGE UP (ref 3.5–5.3)
POTASSIUM SERPL-SCNC: 3.9 MMOL/L — SIGNIFICANT CHANGE UP (ref 3.5–5.3)
PROT SERPL-MCNC: 5.6 G/DL — LOW (ref 6–8.3)
RBC # BLD: 3.29 M/UL — LOW (ref 4.2–5.8)
RBC # FLD: 14.6 % — HIGH (ref 10.3–14.5)
SODIUM SERPL-SCNC: 140 MMOL/L — SIGNIFICANT CHANGE UP (ref 135–145)
WBC # BLD: 3.51 K/UL — LOW (ref 3.8–10.5)
WBC # FLD AUTO: 3.51 K/UL — LOW (ref 3.8–10.5)

## 2025-01-18 PROCEDURE — 99497 ADVNCD CARE PLAN 30 MIN: CPT | Mod: 25

## 2025-01-18 PROCEDURE — 99232 SBSQ HOSP IP/OBS MODERATE 35: CPT

## 2025-01-18 RX ORDER — METOCLOPRAMIDE 10 MG/1
5 TABLET ORAL ONCE
Refills: 0 | Status: COMPLETED | OUTPATIENT
Start: 2025-01-18 | End: 2025-01-18

## 2025-01-18 RX ADMIN — METHADONE HYDROCHLORIDE 10 MILLIGRAM(S): 5 SOLUTION ORAL at 21:54

## 2025-01-18 RX ADMIN — Medication 125 MICROGRAM(S): at 06:10

## 2025-01-18 RX ADMIN — METHADONE HYDROCHLORIDE 10 MILLIGRAM(S): 5 SOLUTION ORAL at 06:10

## 2025-01-18 RX ADMIN — METHADONE HYDROCHLORIDE 10 MILLIGRAM(S): 5 SOLUTION ORAL at 15:00

## 2025-01-18 RX ADMIN — OLANZAPINE 5 MILLIGRAM(S): 10 TABLET, FILM COATED ORAL at 21:55

## 2025-01-18 RX ADMIN — HYDROMORPHONE HYDROCHLORIDE 1.5 MILLIGRAM(S): 4 INJECTION, SOLUTION INTRAMUSCULAR; INTRAVENOUS; SUBCUTANEOUS at 00:54

## 2025-01-18 RX ADMIN — ASPIRIN 81 MILLIGRAM(S): 81 TABLET, COATED ORAL at 11:22

## 2025-01-18 RX ADMIN — HYDROMORPHONE HYDROCHLORIDE 1.5 MILLIGRAM(S): 4 INJECTION, SOLUTION INTRAMUSCULAR; INTRAVENOUS; SUBCUTANEOUS at 01:50

## 2025-01-18 RX ADMIN — ONDANSETRON 8 MILLIGRAM(S): 4 TABLET, ORALLY DISINTEGRATING ORAL at 05:37

## 2025-01-18 RX ADMIN — ONDANSETRON 8 MILLIGRAM(S): 4 TABLET, ORALLY DISINTEGRATING ORAL at 15:15

## 2025-01-18 RX ADMIN — GABAPENTIN 400 MILLIGRAM(S): 800 TABLET ORAL at 06:11

## 2025-01-18 RX ADMIN — HYDROMORPHONE HYDROCHLORIDE 1.5 MILLIGRAM(S): 4 INJECTION, SOLUTION INTRAMUSCULAR; INTRAVENOUS; SUBCUTANEOUS at 10:14

## 2025-01-18 RX ADMIN — Medication 10 GRAM(S): at 11:22

## 2025-01-18 RX ADMIN — HYDROMORPHONE HYDROCHLORIDE 1.5 MILLIGRAM(S): 4 INJECTION, SOLUTION INTRAMUSCULAR; INTRAVENOUS; SUBCUTANEOUS at 15:25

## 2025-01-18 RX ADMIN — GABAPENTIN 400 MILLIGRAM(S): 800 TABLET ORAL at 15:00

## 2025-01-18 RX ADMIN — GABAPENTIN 400 MILLIGRAM(S): 800 TABLET ORAL at 21:54

## 2025-01-18 RX ADMIN — METOCLOPRAMIDE 5 MILLIGRAM(S): 10 TABLET ORAL at 11:26

## 2025-01-18 NOTE — DIETITIAN INITIAL EVALUATION ADULT - ENERGY INTAKE
Pt currently on Soft and Bite Sized diet with Ensure Plus High protein, pt unable to tolerate due to nausea and emesis. Pt noted with pain, RN made aware. Palliative care following for GOC, per GOC discussion on 1/17 daughter currently in agreement with focusing on comfort and symptom management (i.e., no labs, no NG tube, no CT a/p) and to limit unnecessary PO medications as patient is not able to tolerate PO much. Pending further discussion with family.  Poor (<50%)

## 2025-01-18 NOTE — DIETITIAN INITIAL EVALUATION ADULT - PERTINENT MEDS FT
MEDICATIONS  (STANDING):  aspirin  chewable 81 milliGRAM(s) Oral daily  dextrose 5% + sodium chloride 0.9%. 1000 milliLiter(s) (60 mL/Hr) IV Continuous <Continuous>  digoxin     Tablet 125 MICROGram(s) Oral daily  gabapentin 400 milliGRAM(s) Oral every 8 hours  influenza  Vaccine (HIGH DOSE) 0.5 milliLiter(s) IntraMuscular once  lactulose Syrup 10 Gram(s) Oral daily  methadone    Tablet 10 milliGRAM(s) Oral three times a day  OLANZapine Disintegrating Tablet 5 milliGRAM(s) Oral <User Schedule>  ondansetron Injectable 8 milliGRAM(s) IV Push every 8 hours    MEDICATIONS  (PRN):  acetaminophen     Tablet .. 650 milliGRAM(s) Oral every 6 hours PRN Temp greater or equal to 38C (100.4F), Mild Pain (1 - 3)  aluminum hydroxide/magnesium hydroxide/simethicone Suspension 30 milliLiter(s) Oral every 4 hours PRN Dyspepsia  HYDROmorphone  Injectable 1.5 milliGRAM(s) IV Push every 4 hours PRN Severe Pain (7 - 10)  HYDROmorphone  Injectable 1 milliGRAM(s) IV Push every 4 hours PRN Moderate Pain (4 - 6)  melatonin 3 milliGRAM(s) Oral at bedtime PRN Insomnia

## 2025-01-18 NOTE — DIETITIAN INITIAL EVALUATION ADULT - PROBLEM SELECTOR PLAN 7
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

## 2025-01-18 NOTE — DISCHARGE NOTE PROVIDER - DETAILS OF MALNUTRITION DIAGNOSIS/DIAGNOSES
This patient has been assessed with a concern for Malnutrition and was treated during this hospitalization for the following Nutrition diagnosis/diagnoses:     -  01/18/2025: Severe protein-calorie malnutrition   -  01/18/2025: Underweight (BMI < 19)

## 2025-01-18 NOTE — DIETITIAN INITIAL EVALUATION ADULT - ADD RECOMMEND
1) Continue current diet order as tolerated. 2) Monitor GOC, Palliative care following, nutrition interventions to be consistent with GOC

## 2025-01-18 NOTE — DIETITIAN INITIAL EVALUATION ADULT - REASON FOR ADMISSION
Syncope and collapse    Chart reviewed, events noted. This is a "69y M PMH HTN, HLD, Afib on Eliquis, CAD s/p 3 stents, metastatic pancreatic cancer with duodenal obstruction, recent admission at [facility] to surgery for duodenal obstruction s/p stent placement  noted to have increased ascites so sent to IR suite today for therapeutic paracentesis (2.2 L removed) noted to have syncopal/near syncopal episode post para so code blue code called and pt was sent to ED for further eval."

## 2025-01-18 NOTE — DIETITIAN INITIAL EVALUATION ADULT - ORAL INTAKE PTA/DIET HISTORY
Pt seen with son at bedside, noted with emesis. Information obtained from brief interview with daughter via phone, recent RD notes and chart. Per previous RD note on 1/1/25 pt has reported decreased PO intake and appetite x 2 years in setting of chemotherapy, worsening over the last month due to persistent nausea and emesis. NKFA per chart. No reports of difficulties chewing/swallowing noted per chart.

## 2025-01-18 NOTE — DIETITIAN INITIAL EVALUATION ADULT - ETIOLOGY
related to increased physiological demand in setting of chronic illness, wound healing  related to metastatic pancreatic adenocarcinoma

## 2025-01-18 NOTE — PROGRESS NOTE ADULT - PROBLEM SELECTOR PLAN 2
- Hx unresectable metastatic pancreatic adenocarcinoma s/p multiple lines of therapy including FOLFIRINOX, chemoRT with Xeloda, and FOLFOX x2 cycles but stopped due to intolerance. Had a celiac plexus block as well per review of prior medical record.  - patient was following at [facility] but was informed no further treatment options.   - patient was also following with Palliative Care team at [facility] with Dr. Ramirez who has been managing his pain, was referred for home hospice ([facility]), but put on hold as patient was undergoing second opinion and evaluation with Dr. Amol Fernandez at [facility]. Patient is unlikely a candidate for further treatment.   - patient with persistent n/v and likely epigastric pain as a result  - appreciate palliative input:   - c/w IV zofran 8mg q8h ATC, c/w zyprexa 2.5mg po qhs   - IV Ofirmev 1G q8h PRN mild pain, dilaudid 1mg IV q4h PRN mod pain, 1.5mg IV q4h PRN severe pain   - If able to tolerate po, can restart home dilaudid dose: 4mg po q4h PRN and continue methadone 10mg po TID  - d/w pt's dtr Kimberli ; she is requesting evaluation by Dr Toro Alonso ( Surgical Oncology) - Hx unresectable metastatic pancreatic adenocarcinoma s/p multiple lines of therapy including FOLFIRINOX, chemoRT with Xeloda, and FOLFOX x2 cycles but stopped due to intolerance. Had a celiac plexus block as well per review of prior medical record.  - patient was following at [facility] but was informed no further treatment options.   - patient was also following with Palliative Care team at [facility] with Dr. Ramirez who has been managing his pain, was referred for home hospice ([facility]), but put on hold as patient was undergoing second opinion and evaluation with Dr. Amol Fernandez at [facility]. Patient is unlikely a candidate for further treatment.   - patient with persistent n/v and likely epigastric pain as a result  - appreciate palliative input:   - c/w IV zofran 8mg q8h ATC, c/w zyprexa 2.5mg po qhs   - IV Ofirmev 1G q8h PRN mild pain, dilaudid 1mg IV q4h PRN mod pain, 1.5mg IV q4h PRN severe pain   - If able to tolerate po, can restart home dilaudid dose: 4mg po q4h PRN and continue methadone 10mg po TID  - d/w pt's dtr [facility] ; she requested evaluation by Dr Toro Alonso ( Surgical Oncology  ) before making a decision regarding hospice.  I spoke with Dr Alonso and he states that he has also recommended hospice during pt's recent admission to [facility] ; she is in agreement to Hospice referral to [facility]. CM will facilitate

## 2025-01-18 NOTE — DIETITIAN INITIAL EVALUATION ADULT - SIGNS/SYMPTOMS
metastatic pancreatic adenocarcinoma, pressure injury  > 5% weight loss x 1 month, meeting < 75% of estimated needs >/= 1 month, BMI < 19

## 2025-01-18 NOTE — DISCHARGE NOTE PROVIDER - NSDCCPCAREPLAN_GEN_ALL_CORE_FT
PRINCIPAL DISCHARGE DIAGNOSIS  Diagnosis: Syncope and collapse  Assessment and Plan of Treatment: Pt presented to Interventional Radiology for removal of fluid from the abdomen (therapeutic paracentesis 2.2L removed) on 1/16/25 and experienced a possible unconscious episode after the procedure.  The syncope was suspected to be due to likley due to low blood pressure related to fluid shifts in the setting of severe deconditioning and cachexia.   You later ambulated with a walker with physical therapy and was recommended home physical therapy upon discharge.   For constipation, he is on lactulose, with the addition of MiraLAX, Senna, and Dulcolax suppositories as needed; an abdominal x-ray was ordered to evaluate for stool burden.      SECONDARY DISCHARGE DIAGNOSES  Diagnosis: Pancreatic cancer  Assessment and Plan of Treatment: Follow up with Dr. Amol Fernandez at [facility] - you wanted to discuss further disease-modifying treatments.   Also follow up with Palliative Care at [facility] with Dr. Ramirez for pain management.    Diagnosis: Moderate dehydration  Assessment and Plan of Treatment: resolved    Diagnosis: Paroxysmal atrial fibrillation  Assessment and Plan of Treatment:     Diagnosis: CAD S/P percutaneous coronary angioplasty  Assessment and Plan of Treatment:     Diagnosis: HTN (hypertension)  Assessment and Plan of Treatment:      PRINCIPAL DISCHARGE DIAGNOSIS  Diagnosis: Syncope and collapse  Assessment and Plan of Treatment: Pt presented to Interventional Radiology for removal of fluid from the abdomen (therapeutic paracentesis 2.2L removed) on 1/16/25 and experienced a possible unconscious episode after the procedure.  The syncope was suspected to be due to likley due to low blood pressure related to fluid shifts in the setting of severe deconditioning and cachexia.   You later ambulated with a walker with physical therapy and was recommended home physical therapy upon discharge.   For constipation, he is on lactulose, with the addition of MiraLAX, Senna, and Dulcolax suppositories as needed; an abdominal x-ray was ordered to evaluate for stool burden.      SECONDARY DISCHARGE DIAGNOSES  Diagnosis: Pancreatic cancer  Assessment and Plan of Treatment: Follow up with Dr. Amol Fernandez at [facility] - you wanted to discuss further disease-modifying treatments.   Also follow up with Palliative Care at [facility] with Dr. Ramirez for pain management.  Continue methadone 10 mg TID, dilaudid 4mg PO every 4 hours as needed for moderate pain, 8 mg PO every 4 hours as needed severe pain, Zyprexia 5mg at bedtime and PRN Zofran    Diagnosis: Moderate dehydration  Assessment and Plan of Treatment: resolved    Diagnosis: Paroxysmal atrial fibrillation  Assessment and Plan of Treatment: Continue Eliquis and Digoxin    Diagnosis: CAD S/P percutaneous coronary angioplasty  Assessment and Plan of Treatment: Continue Aspirin and Atorvastatin    Diagnosis: HTN (hypertension)  Assessment and Plan of Treatment: Stop home blood pressure medication  (isosorbide and amlodipine) given stable blood pressures off medications.  Follow up with primary physician for continued management

## 2025-01-18 NOTE — PROGRESS NOTE ADULT - PROBLEM SELECTOR PLAN 7
- long GOC discussion had with the daughter (Kimberli) regarding the patient's current condition, trajectory of his care, code status, consideration of shifting focus on comfort and hospice (see GOC note above).  - Kimberli will discuss further with rest of her family regarding above but for now, in agreement with focusing on comfort and symptom management (i.e., no labs, no NG tube, no CT a/p)  - will limit unnecessary PO medications as patient is not able to tolerate PO much  - will need ongoing GOC discussion regarding code status and hospice  - appreciated palliative team's assistance. - had GOC discussion had with the daughter (Kimberli)   - she is now in agreement to Hospice referral to [facility]. CM will facilitate

## 2025-01-18 NOTE — DIETITIAN INITIAL EVALUATION ADULT - NS FNS DIET ORDER
Diet, Regular:   Soft and Bite Sized ([id number])  Supplement Feeding Modality:  Oral  Ensure Enlive Cans or Servings Per Day:  1       Frequency:  Daily (01-16-25 @ 22:30) [Active]

## 2025-01-18 NOTE — DISCHARGE NOTE PROVIDER - NSDCFUSCHEDAPPT_GEN_ALL_CORE_FT
[facility]  CATSCAN [address]  Scheduled Appointment: 01/24/2025    [facility]  [facility]  Scheduled Appointment: 01/28/2025    Amol Fernandez  [facility]  [facility]  Scheduled Appointment: 01/28/2025    Toro Alonso  [facility]  SURGONC [address]  Scheduled Appointment: 01/28/2025    Marcelino España  [facility]  RADMED [address]  Scheduled Appointment: 02/06/2025

## 2025-01-18 NOTE — DISCHARGE NOTE PROVIDER - PROVIDER TOKENS
PROVIDER:[TOKEN:[[id number]],FOLLOWUP:[1-3 days]],PROVIDER:[TOKEN:[[id number]],FOLLOWUP:[1 week]]

## 2025-01-18 NOTE — DIETITIAN INITIAL EVALUATION ADULT - PERTINENT LABORATORY DATA
01-18    140  |  104  |  13  ----------------------------<  152[H]  3.9   |  29  |  0.75    Ca    8.9      18 Jan 2025 09:50  Phos  3.1     01-16  Mg     2.2     01-18    TPro  5.6[L]  /  Alb  2.9[L]  /  TBili  1.1  /  DBili  x   /  AST  40  /  ALT  24  /  AlkPhos  593[H]  01-18  POCT Blood Glucose.: 127 mg/dL (01-17-25 @ 21:27)  A1C with Estimated Average Glucose Result: 5.2 % (12-31-24 @ 12:19)

## 2025-01-18 NOTE — PROGRESS NOTE ADULT - PROBLEM SELECTOR PLAN 5
- at home takes Isosorbide 30mg bid, amlodipine 5mg qd  - hold isosorbide and amlodipine   - BP has been stable off meds

## 2025-01-18 NOTE — PROGRESS NOTE ADULT - PROBLEM SELECTOR PLAN 3
- at home takes Digoxin 125mcg qd, Eliquis 5mg bid but patient not able to tolerate much PO  - will hold eliquis  - continue digoxin if able to tolerate PO - at home takes Digoxin 125mcg qd, Eliquis 5mg bid but patient not able to tolerate much PO  - c/t hold eliquis  - continue digoxin if able to tolerate PO

## 2025-01-18 NOTE — DISCHARGE NOTE PROVIDER - CARE PROVIDER_API CALL
Amol Fernandez  Medical Oncology  [address] [place] [id number]  Phone: [phone]  Fax: [phone]  Follow Up Time: 1-3 days    Tanisha Ramirez  Phone: ()-  Fax: ()-  Follow Up Time: 1 week

## 2025-01-18 NOTE — PROGRESS NOTE ADULT - SUBJECTIVE AND OBJECTIVE BOX
Patient is a 69y old  Male who presents with a chief complaint of Syncope and collapse    Chart reviewed, events noted. This is a "69y M PMH HTN, HLD, Afib on Eliquis, CAD s/p 3 stents, metastatic pancreatic cancer with duodenal obstruction, recent admission at [facility] to surgery for duodenal obstruction s/p stent placement  noted to have increased ascites so sent to IR suite today for therapeutic paracentesis (2.2 L removed) noted to have syncopal/near syncopal episode post para so code blue code called and pt was sent to ED for further eval." (18 Jan 2025 11:38)      SUBJECTIVE / OVERNIGHT EVENTS: Pt seen and examined w/family at bedside. No acute events overnight. Limited ROS on account of somnolence    MEDICATIONS  (STANDING):  aspirin  chewable 81 milliGRAM(s) Oral daily  dextrose 5% + sodium chloride 0.9%. 1000 milliLiter(s) (60 mL/Hr) IV Continuous <Continuous>  digoxin     Tablet 125 MICROGram(s) Oral daily  gabapentin 400 milliGRAM(s) Oral every 8 hours  influenza  Vaccine (HIGH DOSE) 0.5 milliLiter(s) IntraMuscular once  lactulose Syrup 10 Gram(s) Oral daily  methadone    Tablet 10 milliGRAM(s) Oral three times a day  OLANZapine Disintegrating Tablet 5 milliGRAM(s) Oral <User Schedule>  ondansetron Injectable 8 milliGRAM(s) IV Push every 8 hours    MEDICATIONS  (PRN):  acetaminophen     Tablet .. 650 milliGRAM(s) Oral every 6 hours PRN Temp greater or equal to 38C (100.4F), Mild Pain (1 - 3)  aluminum hydroxide/magnesium hydroxide/simethicone Suspension 30 milliLiter(s) Oral every 4 hours PRN Dyspepsia  HYDROmorphone  Injectable 1.5 milliGRAM(s) IV Push every 4 hours PRN Severe Pain (7 - 10)  HYDROmorphone  Injectable 1 milliGRAM(s) IV Push every 4 hours PRN Moderate Pain (4 - 6)  melatonin 3 milliGRAM(s) Oral at bedtime PRN Insomnia      Vital Signs Last 24 Hrs  T(C): 36.8 (18 Jan 2025 13:28), Max: 37.1 (17 Jan 2025 20:19)  T(F): 98.3 (18 Jan 2025 13:28), Max: 98.7 (17 Jan 2025 20:19)  HR: 72 (18 Jan 2025 13:28) (65 - 72)  BP: 145/66 (18 Jan 2025 13:28) (117/57 - 147/90)  BP(mean): --  RR: 18 (18 Jan 2025 13:28) (15 - 20)  SpO2: 97% (18 Jan 2025 13:28) (94% - 98%)    Parameters below as of 18 Jan 2025 13:28  Patient On (Oxygen Delivery Method): room air      CAPILLARY BLOOD GLUCOSE      POCT Blood Glucose.: 127 mg/dL (17 Jan 2025 21:27)  POCT Blood Glucose.: 115 mg/dL (17 Jan 2025 17:01)    I&O's Summary    17 Jan 2025 07:01  -  18 Jan 2025 07:00  --------------------------------------------------------  IN: 360 mL / OUT: 0 mL / NET: 360 mL        PHYSICAL EXAM:  CONSTITUTIONAL: cachectic with muscle wasting, very frail appearing  RESPIRATORY: Normal respiratory effort; lungs are clear to auscultation bilaterally  CARDIOVASCULAR: normal S1 and S2; No lower extremity edema  ABDOMEN: Nontender to palpation, normoactive bowel sounds  MUSCULOSKELETAL: no joint swelling or tenderness to palpation  PSYCH: alert and awake; calm    LABS:                        10.3   3.51  )-----------( 144      ( 18 Jan 2025 09:50 )             30.8     01-18    140  |  104  |  13  ----------------------------<  152[H]  3.9   |  29  |  0.75    Ca    8.9      18 Jan 2025 09:50  Phos  3.1     01-16  Mg     2.2     01-18    TPro  5.6[L]  /  Alb  2.9[L]  /  TBili  1.1  /  DBili  x   /  AST  40  /  ALT  24  /  AlkPhos  593[H]  01-18    PT/INR - ( 17 Jan 2025 07:15 )   PT: 13.2 sec;   INR: 1.16 ratio         PTT - ( 16 Jan 2025 17:22 )  PTT:33.7 sec      Urinalysis Basic - ( 18 Jan 2025 09:50 )    Color: x / Appearance: x / SG: x / pH: x  Gluc: 152 mg/dL / Ketone: x  / Bili: x / Urobili: x   Blood: x / Protein: x / Nitrite: x   Leuk Esterase: x / RBC: x / WBC x   Sq Epi: x / Non Sq Epi: x / Bacteria: x

## 2025-01-18 NOTE — DISCHARGE NOTE PROVIDER - CARE PROVIDERS DIRECT ADDRESSES
[email or web address]

## 2025-01-18 NOTE — DIETITIAN INITIAL EVALUATION ADULT - PROBLEM SELECTOR PLAN 2
- Hx/o metastatic pancreatic cancer with duodenal obstruction  S/p recent admission at [facility] to surgery for duodenal obstruction s/p stent placement   - Was following at [facility] where he received palliative chemo & radiation   - S/p therapeutic paracentesis (2.2 L removed) today    - Pain control  - Outpatient f/u with Surg-Onc

## 2025-01-18 NOTE — DISCHARGE NOTE PROVIDER - ATTENDING DISCHARGE PHYSICAL EXAMINATION:
Caren Paige is a 64 year old female presenting for   Chief Complaint   Patient presents with   • Follow-up     3 month follow up on pain management and discuss moles.     Denies Latex allergy or sensitivity.    Medication verified and med list updated  Refills needed today: No    Health Maintenance Due   Topic Date Due   • Hepatitis B Vaccine (For Physician/APC Discussion) (2 of 3 - 3-dose series) 03/10/2010   • Pneumococcal Vaccine 65+ (3) 09/27/2022   • Medicare Advantage- Medicare Wellness Visit  01/01/2023       Patient is due for topics as listed above but is not proceeding with Immunization(s) Hep B and Pneumococcal at this time.  Appt scheduled to perform MWV (Medicare Wellness Visit).      Unaddressed Risk Adjusted HCC Categories and Diagnoses  HCC 40 - Rheumatoid Arthritis and Inflammatory Connective Tissue Disease  - Unaddressed Dx:Sacroiliitis (Cms/Hcc)  HCC 59 - Major Depressive, Bipolar, and Paranoid Disorders  - Unaddressed Dx:Major Depressive Disorder, Recurrent Episode, Moderate (Cms/Hcc)      Last lab results:   Hemoglobin A1C (%)   Date Value   10/14/2021 5.7 (H)     Cholesterol (mg/dL)   Date Value   12/28/2020 224 (H)     HDL (mg/dL)   Date Value   12/28/2020 39 (L)     Triglycerides (mg/dL)   Date Value   12/28/2020 344 (H)     LDL (mg/dL)   Date Value   12/28/2020 116     No results found for: URMIC, UCR, MALBCR  No results found for: IFOB               Depression Screening:  Recent Review Flowsheet Data     Date 6/23/2022    Adult PHQ 2 Score 1    Adult PHQ 2 Interpretation No further screening needed    Little interest or pleasure in activity? Not at all    Feeling down, depressed or hopeless? Several days           Advance Directives:  discussed and patient declined   Pt w/ BM last evening. Denies pain. No PRNs required.    PHYSICAL EXAM:  GENERAL: cachectic   HEAD:  Atraumatic, Normocephalic  EYES: EOMI, conjunctiva and sclera clear  NECK: Supple, No JVD  CHEST/LUNG: Clear to auscultation bilaterally; No wheeze  HEART: Regular rate and rhythm; No murmurs, rubs, or gallops  ABDOMEN: Soft, nontender to palpation + distended   EXTREMITIES:  b/l LE edema to knees  NEUROLOGY: AAOx3, TAYLOR

## 2025-01-18 NOTE — DISCHARGE NOTE PROVIDER - NSDCMRMEDTOKEN_GEN_ALL_CORE_FT
amLODIPine 5 mg oral tablet: 1 tab(s) orally once a day  aspirin 81 mg oral tablet, chewable: 1 tab(s) orally once a day  atorvastatin 20 mg oral tablet: 1 tab(s) orally once a day  Digox 125 mcg (0.125 mg) oral tablet: 1 tab(s) orally once a day  Eliquis 5 mg oral tablet: 1 tab(s) orally 2 times a day Resume on   gabapentin 400 mg oral capsule: 1 cap(s) orally every 8 hours  isosorbide dinitrate 30 mg oral tablet: 1 tab(s) orally 2 times a day  lactulose 10 g/15 mL oral syrup: 15 milliliter(s) orally once a day  methadone 10 mg oral tablet: 1 tab(s) orally 3 times a day  ondansetron 4 mg oral tablet, disintegratin tab(s) orally every 4 hours as needed for  nausea   3:1 Commode: Metastatic Pancreatic Cancer, Syncope  amLODIPine 5 mg oral tablet: 1 tab(s) orally once a day  aspirin 81 mg oral tablet, chewable: 1 tab(s) orally once a day  atorvastatin 20 mg oral tablet: 1 tab(s) orally once a day  Digox 125 mcg (0.125 mg) oral tablet: 1 tab(s) orally once a day  Eliquis 5 mg oral tablet: 1 tab(s) orally 2 times a day Resume on   gabapentin 400 mg oral capsule: 1 cap(s) orally every 8 hours  isosorbide dinitrate 30 mg oral tablet: 1 tab(s) orally 2 times a day  lactulose 10 g/15 mL oral syrup: 15 milliliter(s) orally once a day  methadone 10 mg oral tablet: 1 tab(s) orally 3 times a day  ondansetron 4 mg oral tablet, disintegratin tab(s) orally every 4 hours as needed for  nausea  Outpatient Physical Therapy: for Strengthening   3:1 Commode: Metastatic Pancreatic Cancer, Syncope  aspirin 81 mg oral tablet, chewable: 1 tab(s) orally once a day  atorvastatin 20 mg oral tablet: 1 tab(s) orally once a day  Digox 125 mcg (0.125 mg) oral tablet: 1 tab(s) orally once a day  Eliquis 5 mg oral tablet: 1 tab(s) orally 2 times a day Resume on   gabapentin 400 mg oral capsule: 1 cap(s) orally every 8 hours  HYDROmorphone 4 mg oral tablet: 1 tab(s) orally every 4 hours As needed Moderate Pain (4 - 6)  HYDROmorphone 8 mg oral tablet: 1 tab(s) orally every 4 hours As needed Severe Pain (7 - 10)  lactulose 10 g/15 mL oral syrup: 15 milliliter(s) orally once a day  methadone 10 mg oral tablet: 1 tab(s) orally 3 times a day  OLANZapine 5 mg oral tablet, disintegratin tab(s) orally once a day at 8PM  ondansetron 4 mg oral tablet, disintegratin tab(s) orally every 4 hours as needed for  nausea  Outpatient Physical Therapy: for Strengthening  polyethylene glycol 3350 oral powder for reconstitution: 17 gram(s) orally 2 times a day  senna leaf extract oral tablet: 2 tab(s) orally once a day (at bedtime)

## 2025-01-18 NOTE — DISCHARGE NOTE PROVIDER - HOSPITAL COURSE
HPI:  Mandarin speaking, family at bedside translating per Pt/family preference     69y M PMH HTN, HLD, Afib on Eliquis, CAD s/p 3 stents, metastatic pancreatic cancer with duodenal obstruction, recent admission at [facility] to surgery for duodenal obstruction s/p stent placement  noted to have increased ascites so sent to IR suite today for therapeutic paracentesis (2.2 L removed) noted to have syncopal/near syncopal episode post para so code blue code called and pt was sent to ED for further eval.  Patient had completed paracentesis, had been discharged from IR and was being wheeled out by daughter when it was noticed that patient was complaining of chest discomfort and became unresponsive, though no loss of consciousness.  CODE BLUE was called and patient was brought to the emergency department for further evaluation.  Family reports that patient is not being offered chemotherapy by [facility] due to metastasis.  Despite poor prognosis, family maintains full code status for patient.     At time of encounter pt frail appearing, endorsing nausea, recently given zofran. family (son & daughter) at bedside     ROS: Denies HA, SOB, palpitation, N/V/D, fever, cough, chills, dizziness, sick contact, change in bowel or urinary habits   A 10-system ROS was performed and is negative except as noted above and/or in the HPI.      ED: IVF, Gabapentin, Methadone, placed on tele. Trop neg, BNP minima elevated. CXR neg  (16 Jan 2025 20:14)    Hospital Course:      Important Medication Changes and Reason:    Active or Pending Issues Requiring Follow-up:    Advanced Directives:   [ ] Full code  [ ] DNR  [ ] Hospice    Discharge Diagnoses:         HPI:  Mandarin speaking, family at bedside translating per Pt/family preference     69y M PMH HTN, HLD, Afib on Eliquis, CAD s/p 3 stents, metastatic pancreatic cancer with duodenal obstruction, recent admission at [facility] to surgery for duodenal obstruction s/p stent placement  noted to have increased ascites so sent to IR suite today for therapeutic paracentesis (2.2 L removed) noted to have syncopal/near syncopal episode post para so code blue code called and pt was sent to ED for further eval.  Patient had completed paracentesis, had been discharged from IR and was being wheeled out by daughter when it was noticed that patient was complaining of chest discomfort and became unresponsive, though no loss of consciousness.  CODE BLUE was called and patient was brought to the emergency department for further evaluation.  Family reports that patient is not being offered chemotherapy by [facility] due to metastasis.  Despite poor prognosis, family maintains full code status for patient.     At time of encounter pt frail appearing, endorsing nausea, recently given zofran. family (son & daughter) at bedside     ROS: Denies HA, SOB, palpitation, N/V/D, fever, cough, chills, dizziness, sick contact, change in bowel or urinary habits   A 10-system ROS was performed and is negative except as noted above and/or in the HPI.      ED: IVF, Gabapentin, Methadone, placed on tele. Trop neg, BNP minima elevated. CXR neg  (16 Jan 2025 20:14)    Hospital Course:  Patient admitted for management of syncope and malnutrition. No further syncopal episodes occurred during admission. Patient was evaluated for hospice care and daughter agreed to referral to [facility]. Nutritional support was initiated with Ensure Enlive supplementation. Pain was managed with IV Ofirmev, Dilaudid, and Methadone. Antiemetics (Zofran, Zyprexa) were administered for nausea and vomiting. Eliquis was held due to difficulty with oral intake. Other home medications (Isosorbide, Amlodipine, statin) were also held due to current clinical stability without them.      Important Medication Changes and Reason:    Active or Pending Issues Requiring Follow-up:    Advanced Directives:   [ ] Full code  [ ] DNR  [ ] Hospice    Discharge Diagnoses:  Metastatic Pancreatic Adenocarcinoma  Cachexia/Severe Protein-Calorie Malnutrition  Functional Quadriplegia   Paroxysmal Atrial Fibrillation  Coronary Artery Disease s/p Percutaneous Coronary Angioplasty  Hypertension  Duodenal Obstruction   Biliary Obstruction   Ascites          HPI:  Mandarin speaking, family at bedside translating per Pt/family preference     69y M PMH HTN, HLD, Afib on Eliquis, CAD s/p 3 stents, metastatic pancreatic cancer with duodenal obstruction, recent admission at [facility] to surgery for duodenal obstruction s/p stent placement  noted to have increased ascites so sent to IR suite today for therapeutic paracentesis (2.2 L removed) noted to have syncopal/near syncopal episode post para so code blue code called and pt was sent to ED for further eval.  Patient had completed paracentesis, had been discharged from IR and was being wheeled out by daughter when it was noticed that patient was complaining of chest discomfort and became unresponsive, though no loss of consciousness.  CODE BLUE was called and patient was brought to the emergency department for further evaluation.  Family reports that patient is not being offered chemotherapy by [facility] due to metastasis.  Despite poor prognosis, family maintains full code status for patient.     At time of encounter pt frail appearing, endorsing nausea, recently given zofran. family (son & daughter) at bedside     ROS: Denies HA, SOB, palpitation, N/V/D, fever, cough, chills, dizziness, sick contact, change in bowel or urinary habits   A 10-system ROS was performed and is negative except as noted above and/or in the HPI.      ED: IVF, Gabapentin, Methadone, placed on tele. Trop neg, BNP minima elevated. CXR neg  (16 Jan 2025 20:14)    Hospital Course:  Pt presented to Interventional Radiology for therapeutic paracentesis (2.2L removed) on 1/16/25 and experienced a possible syncopal episode post-procedure, prompting a code blue and admission for further management. The syncope was suspected to be due to transient orthostatic hypotension related to fluid shifts in the setting of severe deconditioning and cachexia. He had no further events on telemetry and was subsequently discontinued from monitoring.   His pancreatic cancer is unresectable and metastatic; he has received multiple lines of therapy, including FOLFIRINOX, chemoradiation with Xeloda, and two cycles of FOLFOX, which was stopped due to intolerance. He also underwent a celiac plexus block. He was informed there are no further treatment options at [facility] but is seeking a second opinion with Dr. Amol Fernandez at [facility] and wishes to explore further disease-modifying treatments. He has functional quadriplegia due to severe deconditioning and cachexia. Goals of care were discussed with the patient, his wife, son-in-law, and daughter. Although home hospice was discussed, he wishes to pursue further treatment options with Dr. Fernandez as an outpatient. He ambulated with a walker with physical therapy and was recommended home physical therapy upon discharge. He is currently following with Palliative Care at [facility] with Dr. Ramirez for pain management. His medications include IV Zofran 8mg q8h, Zyprexa 5mg qhs, methadone 10mg TID, Dilaudid 4mg q4h PRN moderate pain and 8mg q4h PRN severe pain.   He ambulated with a walker with physical therapy and was recommended home physical therapy upon discharge.   For constipation, he is on lactulose, with the addition of MiraLAX, Senna, and Dulcolax suppositories as needed; an abdominal x-ray was ordered to evaluate for stool burden.       Important Medication Changes and Reason:    Active or Pending Issues Requiring Follow-up:    Advanced Directives:   [ ] Full code  [ x] DNR  [ ] Hospice    Discharge Diagnoses:  Metastatic Pancreatic Adenocarcinoma  Cachexia/Severe Protein-Calorie Malnutrition  Functional Quadriplegia   Paroxysmal Atrial Fibrillation - digoxin 125mcg daily and Eliquis 5mg BID   Coronary Artery Disease s/p Percutaneous Coronary Angioplasty - on Aspirin and Statin  Hypertension - home antihypertensives (isosorbide and amlodipine) are being held given stable blood pressures off medications.  Duodenal Obstruction   Biliary Obstruction   Ascites          HPI:  Mandarin speaking, family at bedside translating per Pt/family preference     69y M PMH HTN, HLD, Afib on Eliquis, CAD s/p 3 stents, metastatic pancreatic cancer with duodenal obstruction, recent admission at [facility] to surgery for duodenal obstruction s/p stent placement  noted to have increased ascites so sent to IR suite today for therapeutic paracentesis (2.2 L removed) noted to have syncopal/near syncopal episode post para so code blue code called and pt was sent to ED for further eval.  Patient had completed paracentesis, had been discharged from IR and was being wheeled out by daughter when it was noticed that patient was complaining of chest discomfort and became unresponsive, though no loss of consciousness.  CODE BLUE was called and patient was brought to the emergency department for further evaluation.  Family reports that patient is not being offered chemotherapy by [facility] due to metastasis.  Despite poor prognosis, family maintains full code status for patient.     At time of encounter pt frail appearing, endorsing nausea, recently given zofran. family (son & daughter) at bedside     ROS: Denies HA, SOB, palpitation, N/V/D, fever, cough, chills, dizziness, sick contact, change in bowel or urinary habits   A 10-system ROS was performed and is negative except as noted above and/or in the HPI.      ED: IVF, Gabapentin, Methadone, placed on tele. Trop neg, BNP minima elevated. CXR neg  (16 Jan 2025 20:14)    Hospital Course:  Pt presented to Interventional Radiology for therapeutic paracentesis (2.2L removed) on 1/16/25 and experienced a possible syncopal episode post-procedure, prompting a code blue and admission for further management. The syncope was suspected to be due to transient orthostatic hypotension related to fluid shifts in the setting of severe deconditioning and cachexia. He had no further events on telemetry and was subsequently discontinued from monitoring.   His pancreatic cancer is unresectable and metastatic; he has received multiple lines of therapy, including FOLFIRINOX, chemoradiation with Xeloda, and two cycles of FOLFOX, which was stopped due to intolerance. He also underwent a celiac plexus block. He was informed there are no further treatment options at [facility] but is seeking a second opinion with Dr. Amol Fernandez at [facility] and wishes to explore further disease-modifying treatments. He has functional quadriplegia due to severe deconditioning and cachexia. Goals of care were discussed with the patient, his wife, son-in-law, and daughter. Although home hospice was discussed, he wishes to pursue further treatment options with Dr. Fernandez as an outpatient. He ambulated with a walker with physical therapy and was recommended home physical therapy upon discharge. He is currently following with Palliative Care at [facility] with Dr. Ramirez for pain management.    He ambulated with a walker with physical therapy and was recommended home physical therapy upon discharge.   For constipation, he is on lactulose, with the addition of MiraLAX, Senna, and Dulcolax suppositories as needed; an abdominal x-ray showed no significant stool burden.       Important Medication Changes and Reason:    Active or Pending Issues Requiring Follow-up:    Advanced Directives:   [ ] Full code  [ x] DNR  [ ] Hospice    Discharge Diagnoses:  Metastatic Pancreatic Adenocarcinoma  Cachexia/Severe Protein-Calorie Malnutrition  Functional Quadriplegia   Paroxysmal Atrial Fibrillation - digoxin 125mcg daily and Eliquis 5mg BID   Coronary Artery Disease s/p Percutaneous Coronary Angioplasty - on Aspirin and Statin  Hypertension - home antihypertensives (isosorbide and amlodipine) are being held given stable blood pressures off medications.  Duodenal Obstruction   Biliary Obstruction   Ascites          HPI:  Mandarin speaking, family at bedside translating per Pt/family preference     69y M PMH HTN, HLD, Afib on Eliquis, CAD s/p 3 stents, metastatic pancreatic cancer with duodenal obstruction, recent admission at [facility] to surgery for duodenal obstruction s/p stent placement  noted to have increased ascites so sent to IR suite today for therapeutic paracentesis (2.2 L removed) noted to have syncopal/near syncopal episode post para so code blue code called and pt was sent to ED for further eval.  Patient had completed paracentesis, had been discharged from IR and was being wheeled out by daughter when it was noticed that patient was complaining of chest discomfort and became unresponsive, though no loss of consciousness.  CODE BLUE was called and patient was brought to the emergency department for further evaluation.  Family reports that patient is not being offered chemotherapy by [facility] due to metastasis.  Despite poor prognosis, family maintains full code status for patient.     At time of encounter pt frail appearing, endorsing nausea, recently given zofran. family (son & daughter) at bedside     ROS: Denies HA, SOB, palpitation, N/V/D, fever, cough, chills, dizziness, sick contact, change in bowel or urinary habits   A 10-system ROS was performed and is negative except as noted above and/or in the HPI.      ED: IVF, Gabapentin, Methadone, placed on tele. Trop neg, BNP minima elevated. CXR neg  (16 Jan 2025 20:14)    Hospital Course:  Pt presented to Interventional Radiology for therapeutic paracentesis (2.2L removed) on 1/16/25 and experienced a possible syncopal episode post-procedure, prompting a code blue and admission for further management. The syncope was suspected to be due to transient orthostatic hypotension related to fluid shifts in the setting of severe deconditioning and cachexia. He had no further events on telemetry and was subsequently discontinued from monitoring.   His pancreatic cancer is unresectable and metastatic; he has received multiple lines of therapy, including FOLFIRINOX, chemoradiation with Xeloda, and two cycles of FOLFOX, which was stopped due to intolerance. He also underwent a celiac plexus block. He was informed there are no further treatment options at [facility] but is seeking a second opinion with Dr. Amol Fernandez at [facility] and wishes to explore further disease-modifying treatments. He has functional quadriplegia due to severe deconditioning and cachexia. Goals of care were discussed with the patient, his wife, son-in-law, and daughter. Although home hospice was discussed, he wishes to pursue further treatment options with Dr. Fernandez as an outpatient. He ambulated with a walker with physical therapy and was recommended home physical therapy upon discharge. He is currently following with Palliative Care at [facility] with Dr. Ramirez for pain management. Continue methadone 10 mg TID, dilaudid 4mg PO q4h PRN mod pain, 8 mg PO q4h PRN severe pain, Zyprexia 5mg HS and PRN Zofran  - constipation: c/w lactulose, add miralax senna. AXR to eval stool burden.   He ambulated with a walker with physical therapy and was recommended home physical therapy upon discharge.   For constipation, he is on lactulose, with the addition of MiraLAX, Senna, and Dulcolax suppositories as needed; an abdominal x-ray showed no significant stool burden.   Hypertension - home antihypertensives (isosorbide and amlodipine) are being held given stable blood pressures off medications.      Important Medication Changes and Reason:  Hypertension - home antihypertensives (isosorbide and amlodipine) are being held given stable blood pressures off medications.    Active or Pending Issues Requiring Follow-up:  Follow up with Oncologist - Dr. Fernandez on 1/28/25    Advanced Directives:   [ ] Full code  [ x] DNR  [ ] Hospice    Discharge Diagnoses:  Metastatic Pancreatic Adenocarcinoma  Cachexia/Severe Protein-Calorie Malnutrition  Functional Quadriplegia   Paroxysmal Atrial Fibrillation - digoxin 125mcg daily and Eliquis 5mg BID   Coronary Artery Disease s/p Percutaneous Coronary Angioplasty - on Aspirin and Atorvastatin  Hypertension - home antihypertensives (isosorbide and amlodipine) are being held given stable blood pressures off medications.  Duodenal Obstruction   Biliary Obstruction   Ascites     Medically cleared by Dr. Brar to discharge patient home with home care today. Med rec discussed

## 2025-01-19 PROCEDURE — 99497 ADVNCD CARE PLAN 30 MIN: CPT | Mod: 25

## 2025-01-19 PROCEDURE — 99233 SBSQ HOSP IP/OBS HIGH 50: CPT | Mod: 25

## 2025-01-19 RX ADMIN — OLANZAPINE 5 MILLIGRAM(S): 10 TABLET, FILM COATED ORAL at 20:22

## 2025-01-19 RX ADMIN — ASPIRIN 81 MILLIGRAM(S): 81 TABLET, COATED ORAL at 13:12

## 2025-01-19 RX ADMIN — SODIUM CHLORIDE 60 MILLILITER(S): 9 INJECTION, SOLUTION INTRAVENOUS at 21:04

## 2025-01-19 RX ADMIN — GABAPENTIN 400 MILLIGRAM(S): 800 TABLET ORAL at 06:25

## 2025-01-19 RX ADMIN — Medication 125 MICROGRAM(S): at 06:26

## 2025-01-19 RX ADMIN — METHADONE HYDROCHLORIDE 10 MILLIGRAM(S): 5 SOLUTION ORAL at 13:12

## 2025-01-19 RX ADMIN — ONDANSETRON 8 MILLIGRAM(S): 4 TABLET, ORALLY DISINTEGRATING ORAL at 10:14

## 2025-01-19 RX ADMIN — METHADONE HYDROCHLORIDE 10 MILLIGRAM(S): 5 SOLUTION ORAL at 06:24

## 2025-01-19 RX ADMIN — Medication 10 GRAM(S): at 13:12

## 2025-01-19 RX ADMIN — METHADONE HYDROCHLORIDE 10 MILLIGRAM(S): 5 SOLUTION ORAL at 21:04

## 2025-01-19 RX ADMIN — GABAPENTIN 400 MILLIGRAM(S): 800 TABLET ORAL at 21:04

## 2025-01-19 RX ADMIN — GABAPENTIN 400 MILLIGRAM(S): 800 TABLET ORAL at 13:12

## 2025-01-19 RX ADMIN — ONDANSETRON 8 MILLIGRAM(S): 4 TABLET, ORALLY DISINTEGRATING ORAL at 23:01

## 2025-01-19 RX ADMIN — SODIUM CHLORIDE 60 MILLILITER(S): 9 INJECTION, SOLUTION INTRAVENOUS at 10:15

## 2025-01-19 RX ADMIN — ONDANSETRON 8 MILLIGRAM(S): 4 TABLET, ORALLY DISINTEGRATING ORAL at 00:03

## 2025-01-19 RX ADMIN — ONDANSETRON 8 MILLIGRAM(S): 4 TABLET, ORALLY DISINTEGRATING ORAL at 17:57

## 2025-01-19 NOTE — PROGRESS NOTE ADULT - PROBLEM SELECTOR PLAN 2
- Hx unresectable metastatic pancreatic adenocarcinoma s/p multiple lines of therapy including FOLFIRINOX, chemoRT with Xeloda, and FOLFOX x2 cycles but stopped due to intolerance. Had a celiac plexus block as well per review of prior medical record.  - patient was following at [facility] but was informed no further treatment options.   - patient was also following with Palliative Care team at [facility] with Dr. Ramirez who has been managing his pain, was referred for home hospice ([facility]), but put on hold as patient was undergoing second opinion and evaluation with Dr. Amol Fernandez at [facility]. Patient is unlikely a candidate for further treatment.   - patient with persistent n/v and likely epigastric pain as a result  - appreciate palliative input:   - c/w IV zofran 8mg q8h ATC, c/w zyprexa 2.5mg po qhs   - IV Ofirmev 1G q8h PRN mild pain, dilaudid 1mg IV q4h PRN mod pain, 1.5mg IV q4h PRN severe pain   - If able to tolerate po, can restart home dilaudid dose: 4mg po q4h PRN and continue methadone 10mg po TID  - d/w pt's dtr [facility] ; she requested evaluation by Dr Toro Alonso ( Surgical Oncology  ) before making a decision regarding hospice.  I spoke with Dr Alonso and he states that he has also recommended hospice during pt's recent admission to [facility] ; she is in agreement to Hospice referral to [facility]. CM will facilitate

## 2025-01-19 NOTE — PROGRESS NOTE ADULT - PROBLEM SELECTOR PLAN 3
- at home takes Digoxin 125mcg qd, Eliquis 5mg bid but patient not able to tolerate much PO  - c/t hold eliquis  - continue digoxin if able to tolerate PO

## 2025-01-19 NOTE — PROGRESS NOTE ADULT - SUBJECTIVE AND OBJECTIVE BOX
[facility] Division of Hospital Medicine  Jonathan Hendrickson MD  Available via MS Teams    SUBJECTIVE / OVERNIGHT EVENTS:    ADDITIONAL REVIEW OF SYSTEMS:    MEDICATIONS  (STANDING):  aspirin  chewable 81 milliGRAM(s) Oral daily  dextrose 5% + sodium chloride 0.9%. 1000 milliLiter(s) (60 mL/Hr) IV Continuous <Continuous>  digoxin     Tablet 125 MICROGram(s) Oral daily  gabapentin 400 milliGRAM(s) Oral every 8 hours  influenza  Vaccine (HIGH DOSE) 0.5 milliLiter(s) IntraMuscular once  lactulose Syrup 10 Gram(s) Oral daily  methadone    Tablet 10 milliGRAM(s) Oral three times a day  OLANZapine Disintegrating Tablet 5 milliGRAM(s) Oral <User Schedule>  ondansetron Injectable 8 milliGRAM(s) IV Push every 8 hours    MEDICATIONS  (PRN):  acetaminophen     Tablet .. 650 milliGRAM(s) Oral every 6 hours PRN Temp greater or equal to 38C (100.4F), Mild Pain (1 - 3)  aluminum hydroxide/magnesium hydroxide/simethicone Suspension 30 milliLiter(s) Oral every 4 hours PRN Dyspepsia  HYDROmorphone  Injectable 1.5 milliGRAM(s) IV Push every 4 hours PRN Severe Pain (7 - 10)  HYDROmorphone  Injectable 1 milliGRAM(s) IV Push every 4 hours PRN Moderate Pain (4 - 6)  melatonin 3 milliGRAM(s) Oral at bedtime PRN Insomnia      I&O's Summary    18 Jan 2025 07:01  -  19 Jan 2025 07:00  --------------------------------------------------------  IN: 720 mL / OUT: 250 mL / NET: 470 mL        PHYSICAL EXAM:  Vital Signs Last 24 Hrs  T(C): 37.2 (19 Jan 2025 04:10), Max: 37.2 (19 Jan 2025 04:10)  T(F): 98.9 (19 Jan 2025 04:10), Max: 98.9 (19 Jan 2025 04:10)  HR: 51 (19 Jan 2025 04:10) (51 - 72)  BP: 105/67 (19 Jan 2025 04:10) (105/67 - 145/66)  BP(mean): --  RR: 18 (19 Jan 2025 04:10) (18 - 18)  SpO2: 95% (19 Jan 2025 04:10) (95% - 97%)    Parameters below as of 19 Jan 2025 04:10  Patient On (Oxygen Delivery Method): room air      CONSTITUTIONAL: cachectic with muscle wasting, very frail appearing  RESPIRATORY: Normal respiratory effort; lungs are clear to auscultation bilaterally  CARDIOVASCULAR: normal S1 and S2; No lower extremity edema  ABDOMEN: Nontender to palpation, normoactive bowel sounds  MUSCULOSKELETAL: no joint swelling or tenderness to palpation  PSYCH: alert and awake; calm      LABS:                        10.3   3.51  )-----------( 144      ( 18 Jan 2025 09:50 )             30.8     01-18    140  |  104  |  13  ----------------------------<  152[H]  3.9   |  29  |  0.75    Ca    8.9      18 Jan 2025 09:50  Mg     2.2     01-18    TPro  5.6[L]  /  Alb  2.9[L]  /  TBili  1.1  /  DBili  x   /  AST  40  /  ALT  24  /  AlkPhos  593[H]  01-18          Urinalysis Basic - ( 18 Jan 2025 09:50 )    Color: x / Appearance: x / SG: x / pH: x  Gluc: 152 mg/dL / Ketone: x  / Bili: x / Urobili: x   Blood: x / Protein: x / Nitrite: x   Leuk Esterase: x / RBC: x / WBC x   Sq Epi: x / Non Sq Epi: x / Bacteria: x        Culture - Fungal, Body Fluid (collected 16 Jan 2025 15:07)  Source: Peritoneal  Preliminary Report (18 Jan 2025 23:03):    Culture is being performed. Fungal cultures are held for 4 weeks.    Culture - Body Fluid with Gram Stain (collected 16 Jan 2025 15:07)  Source: Peritoneal  Gram Stain (16 Jan 2025 22:51):    polymorphonuclear leukocytes seen    No organisms seen    by cytocentrifuge  Preliminary Report (17 Jan 2025 19:19):    No growth          RADIOLOGY & ADDITIONAL TESTS: Reviewed [facility] Division of Hospital Medicine  Jonathan Hendrickson MD  Available via MS Teams    SUBJECTIVE / OVERNIGHT EVENTS:  No acute events overnight. Patient appears comfortable but sleepy this morning, easily awakens to voice, denies any complaints. Daughter states patient was very sleepy overnight and this morning and is concerned about fluid accumulation in his abdomen. Had bowel movement yesterday and since has had less nausea and vomiting. Lengthy goals of care discussion held as per below.    MEDICATIONS  (STANDING):  aspirin  chewable 81 milliGRAM(s) Oral daily  dextrose 5% + sodium chloride 0.9%. 1000 milliLiter(s) (60 mL/Hr) IV Continuous <Continuous>  digoxin     Tablet 125 MICROGram(s) Oral daily  gabapentin 400 milliGRAM(s) Oral every 8 hours  influenza  Vaccine (HIGH DOSE) 0.5 milliLiter(s) IntraMuscular once  lactulose Syrup 10 Gram(s) Oral daily  methadone    Tablet 10 milliGRAM(s) Oral three times a day  OLANZapine Disintegrating Tablet 5 milliGRAM(s) Oral <User Schedule>  ondansetron Injectable 8 milliGRAM(s) IV Push every 8 hours    MEDICATIONS  (PRN):  acetaminophen     Tablet .. 650 milliGRAM(s) Oral every 6 hours PRN Temp greater or equal to 38C (100.4F), Mild Pain (1 - 3)  aluminum hydroxide/magnesium hydroxide/simethicone Suspension 30 milliLiter(s) Oral every 4 hours PRN Dyspepsia  HYDROmorphone  Injectable 1.5 milliGRAM(s) IV Push every 4 hours PRN Severe Pain (7 - 10)  HYDROmorphone  Injectable 1 milliGRAM(s) IV Push every 4 hours PRN Moderate Pain (4 - 6)  melatonin 3 milliGRAM(s) Oral at bedtime PRN Insomnia      I&O's Summary    18 Jan 2025 07:01  -  19 Jan 2025 07:00  --------------------------------------------------------  IN: 720 mL / OUT: 250 mL / NET: 470 mL        PHYSICAL EXAM:  Vital Signs Last 24 Hrs  T(C): 37.2 (19 Jan 2025 04:10), Max: 37.2 (19 Jan 2025 04:10)  T(F): 98.9 (19 Jan 2025 04:10), Max: 98.9 (19 Jan 2025 04:10)  HR: 51 (19 Jan 2025 04:10) (51 - 72)  BP: 105/67 (19 Jan 2025 04:10) (105/67 - 145/66)  BP(mean): --  RR: 18 (19 Jan 2025 04:10) (18 - 18)  SpO2: 95% (19 Jan 2025 04:10) (95% - 97%)    Parameters below as of 19 Jan 2025 04:10  Patient On (Oxygen Delivery Method): room air      CONSTITUTIONAL: cachectic with muscle wasting, very frail appearing  RESPIRATORY: Normal respiratory effort; lungs are clear to auscultation bilaterally  CARDIOVASCULAR: normal S1 and S2; No lower extremity edema  ABDOMEN: Nontender to palpation, normoactive bowel sounds  MUSCULOSKELETAL: no joint swelling or tenderness to palpation  PSYCH: alert and awake; calm      LABS:                        10.3   3.51  )-----------( 144      ( 18 Jan 2025 09:50 )             30.8     01-18    140  |  104  |  13  ----------------------------<  152[H]  3.9   |  29  |  0.75    Ca    8.9      18 Jan 2025 09:50  Mg     2.2     01-18    TPro  5.6[L]  /  Alb  2.9[L]  /  TBili  1.1  /  DBili  x   /  AST  40  /  ALT  24  /  AlkPhos  593[H]  01-18          Urinalysis Basic - ( 18 Jan 2025 09:50 )    Color: x / Appearance: x / SG: x / pH: x  Gluc: 152 mg/dL / Ketone: x  / Bili: x / Urobili: x   Blood: x / Protein: x / Nitrite: x   Leuk Esterase: x / RBC: x / WBC x   Sq Epi: x / Non Sq Epi: x / Bacteria: x        Culture - Fungal, Body Fluid (collected 16 Jan 2025 15:07)  Source: Peritoneal  Preliminary Report (18 Jan 2025 23:03):    Culture is being performed. Fungal cultures are held for 4 weeks.    Culture - Body Fluid with Gram Stain (collected 16 Jan 2025 15:07)  Source: Peritoneal  Gram Stain (16 Jan 2025 22:51):    polymorphonuclear leukocytes seen    No organisms seen    by cytocentrifuge  Preliminary Report (17 Jan 2025 19:19):    No growth          RADIOLOGY & ADDITIONAL TESTS: Reviewed

## 2025-01-19 NOTE — PROGRESS NOTE ADULT - CONVERSATION DETAILS
Discussed with patient's daughter(Kimberli) at length regarding the patient's current condition, trajectory of care, and goals of care moving forward. Patient is severely cachectic and frail with poor functional status and I explained that the patient is unlikely to be a candidate for further systemic cancer treatment. Kimberli is in agreement that the focus moving forward should be on symptom management to ensure that the patient is as comfortable as possible for the remainder of his life. We also spoke about his code status and consideration of hospice. Patient was already referred to [facility] but it was put on hold as he was undergoing second opinion with Dr. Amol Fernandez at [facility]. Kimberli needs some time to discuss further with the rest of family.     Face to face encounter 16mins.
d/w pt's dtr Kimberli ; she requested evaluation by Dr Toro Alonso ( Surgical Oncology  ) before making a decision regarding hospice.  I spoke with Dr Alonso and he states that he has also recommended hospice during pt's recent admission to [facility] ; she is in agreement to Hospice referral to [facility]. CM will facilitate
I spoke with the patient's daughter at the bedside. We discussed her understanding of his diagnosis and prognosis as well as the changes to his care that have been put in place thus far. She expressed that she understood his diagnosis and prognosis well and that the goal is to focus on his comfort, prioritizing quality of life rather than quantity of life. She is interested in a symptom-focused approach to his care at this point in time and a hospice referral has been placed with the goal to bring him home with home hospice services. We revisited the discussion of advance directives and the MOLST form. She stated that she has the form at home but would like her father to remain full code for now. She wants to be able to discuss with him and he has been sleepy over the past few days and she does not feel that he understands. She does understand that DNR/DNI is not in alignment with hospice philosophy and that she will sign a DNR/DNI if her father enrolls in hospice but would like to wait to attempt to discuss this with him further prior to signing MOLST at this time.

## 2025-01-19 NOTE — PROGRESS NOTE ADULT - PROBLEM SELECTOR PLAN 7
- had GOC discussion had with the daughter (Kimberli)   - she is now in agreement to Hospice referral to [facility]. CM will facilitate

## 2025-01-19 NOTE — PROGRESS NOTE ADULT - WHAT MATTERS MOST
that pt remains comfortable, pain free and is not suffering
Spending more quality time together, bringing the patient home.

## 2025-01-20 PROCEDURE — 99232 SBSQ HOSP IP/OBS MODERATE 35: CPT

## 2025-01-20 RX ADMIN — GABAPENTIN 400 MILLIGRAM(S): 800 TABLET ORAL at 13:05

## 2025-01-20 RX ADMIN — ONDANSETRON 8 MILLIGRAM(S): 4 TABLET, ORALLY DISINTEGRATING ORAL at 10:50

## 2025-01-20 RX ADMIN — ONDANSETRON 8 MILLIGRAM(S): 4 TABLET, ORALLY DISINTEGRATING ORAL at 16:10

## 2025-01-20 RX ADMIN — Medication 10 GRAM(S): at 13:04

## 2025-01-20 RX ADMIN — GABAPENTIN 400 MILLIGRAM(S): 800 TABLET ORAL at 21:00

## 2025-01-20 RX ADMIN — METHADONE HYDROCHLORIDE 10 MILLIGRAM(S): 5 SOLUTION ORAL at 21:00

## 2025-01-20 RX ADMIN — GABAPENTIN 400 MILLIGRAM(S): 800 TABLET ORAL at 06:05

## 2025-01-20 RX ADMIN — SODIUM CHLORIDE 60 MILLILITER(S): 9 INJECTION, SOLUTION INTRAVENOUS at 06:06

## 2025-01-20 RX ADMIN — METHADONE HYDROCHLORIDE 10 MILLIGRAM(S): 5 SOLUTION ORAL at 06:05

## 2025-01-20 RX ADMIN — OLANZAPINE 5 MILLIGRAM(S): 10 TABLET, FILM COATED ORAL at 20:05

## 2025-01-20 RX ADMIN — ONDANSETRON 8 MILLIGRAM(S): 4 TABLET, ORALLY DISINTEGRATING ORAL at 23:03

## 2025-01-20 RX ADMIN — ASPIRIN 81 MILLIGRAM(S): 81 TABLET, COATED ORAL at 13:05

## 2025-01-20 RX ADMIN — METHADONE HYDROCHLORIDE 10 MILLIGRAM(S): 5 SOLUTION ORAL at 13:05

## 2025-01-20 NOTE — PROGRESS NOTE ADULT - PROBLEM SELECTOR PLAN 7
- had GOC discussion had with the daughter (Kimberli)   - she is now in agreement to Hospice referral to [facility]. CM will facilitate - had GOC discussion had with the daughter (Kimberli) 1/18  - she is now in agreement to Hospice referral to [facility]. CM will facilitate

## 2025-01-20 NOTE — PROGRESS NOTE ADULT - PROBLEM SELECTOR PLAN 2
- Hx unresectable metastatic pancreatic adenocarcinoma s/p multiple lines of therapy including FOLFIRINOX, chemoRT with Xeloda, and FOLFOX x2 cycles but stopped due to intolerance. Had a celiac plexus block as well per review of prior medical record.  - patient was following at [facility] but was informed no further treatment options.   - patient was also following with Palliative Care team at [facility] with Dr. Ramirez who has been managing his pain, was referred for home hospice ([facility]), but put on hold as patient was undergoing second opinion and evaluation with Dr. Amol Fernandez at [facility]. Patient is unlikely a candidate for further treatment.   - patient with persistent n/v and likely epigastric pain as a result  - appreciate palliative input:   - c/w IV zofran 8mg q8h ATC, c/w zyprexa 2.5mg po qhs   - IV Ofirmev 1G q8h PRN mild pain, dilaudid 1mg IV q4h PRN mod pain, 1.5mg IV q4h PRN severe pain   - If able to tolerate po, can restart home dilaudid dose: 4mg po q4h PRN and continue methadone 10mg po TID  - d/w pt's dtr [facility] on 1/8 ; she requested evaluation by Dr Toro Alonso ( Surgical Oncology  ) before making a decision regarding hospice.  I spoke with Dr Alonso and he states that he has also recommended hospice during pt's recent admission to [facility] ; she is now in agreement to Hospice referral to [facility]. CM will facilitate - Hx unresectable metastatic pancreatic adenocarcinoma s/p multiple lines of therapy including FOLFIRINOX, chemoRT with Xeloda, and FOLFOX x2 cycles but stopped due to intolerance. Had a celiac plexus block as well per review of prior medical record.  - patient was following at [facility] but was informed no further treatment options.   - patient was also following with Palliative Care team at [facility] with Dr. Ramirez who has been managing his pain, was referred for home hospice ([facility]), but put on hold as patient was undergoing second opinion and evaluation with Dr. Amol Fernandez at [facility]. Patient is unlikely a candidate for further treatment.   - patient with persistent n/v and likely epigastric pain as a result  - appreciate palliative input:   - c/w IV zofran 8mg q8h ATC, c/w zyprexa 2.5mg po qhs   - IV Ofirmev 1G q8h PRN mild pain, dilaudid 1mg IV q4h PRN mod pain, 1.5mg IV q4h PRN severe pain   - If able to tolerate po, can restart home dilaudid dose: 4mg po q4h PRN and continue methadone 10mg po TID  - extensive GOC with pt's dtr Kimberli on 1/18 ;  she is now in agreement to Hospice referral to [facility]. CM will facilitate

## 2025-01-20 NOTE — PROGRESS NOTE ADULT - SUBJECTIVE AND OBJECTIVE BOX
Patient is a 69y old  Male who presents with a chief complaint of Syncope (19 Jan 2025 09:27)      SUBJECTIVE / OVERNIGHT EVENTS:  Pt seen and examined. No acute events overnight. Denies cp, sob, abd pain at this time.    MEDICATIONS  (STANDING):  aspirin  chewable 81 milliGRAM(s) Oral daily  dextrose 5% + sodium chloride 0.9%. 1000 milliLiter(s) (60 mL/Hr) IV Continuous <Continuous>  digoxin     Tablet 125 MICROGram(s) Oral daily  gabapentin 400 milliGRAM(s) Oral every 8 hours  influenza  Vaccine (HIGH DOSE) 0.5 milliLiter(s) IntraMuscular once  lactulose Syrup 10 Gram(s) Oral daily  methadone    Tablet 10 milliGRAM(s) Oral three times a day  OLANZapine Disintegrating Tablet 5 milliGRAM(s) Oral <User Schedule>  ondansetron Injectable 8 milliGRAM(s) IV Push every 8 hours    MEDICATIONS  (PRN):  acetaminophen     Tablet .. 650 milliGRAM(s) Oral every 6 hours PRN Temp greater or equal to 38C (100.4F), Mild Pain (1 - 3)  aluminum hydroxide/magnesium hydroxide/simethicone Suspension 30 milliLiter(s) Oral every 4 hours PRN Dyspepsia  HYDROmorphone  Injectable 1.5 milliGRAM(s) IV Push every 4 hours PRN Severe Pain (7 - 10)  HYDROmorphone  Injectable 1 milliGRAM(s) IV Push every 4 hours PRN Moderate Pain (4 - 6)  melatonin 3 milliGRAM(s) Oral at bedtime PRN Insomnia      Vital Signs Last 24 Hrs  T(C): 36.9 (20 Jan 2025 04:14), Max: 36.9 (20 Jan 2025 04:14)  T(F): 98.5 (20 Jan 2025 04:14), Max: 98.5 (20 Jan 2025 04:14)  HR: 57 (20 Jan 2025 04:14) (57 - 59)  BP: 103/70 (20 Jan 2025 04:14) (103/70 - 120/75)  BP(mean): --  RR: 18 (20 Jan 2025 04:14) (18 - 18)  SpO2: 93% (20 Jan 2025 04:14) (93% - 95%)    Parameters below as of 20 Jan 2025 04:14  Patient On (Oxygen Delivery Method): room air      CAPILLARY BLOOD GLUCOSE        I&O's Summary    19 Jan 2025 07:01  -  20 Jan 2025 07:00  --------------------------------------------------------  IN: 960 mL / OUT: 0 mL / NET: 960 mL        PHYSICAL EXAM:  CONSTITUTIONAL: cachectic with muscle wasting, very frail appearing  RESPIRATORY: Normal respiratory effort; lungs are clear to auscultation bilaterally  CARDIOVASCULAR: normal S1 and S2; No lower extremity edema  ABDOMEN: Nontender to palpation, normoactive bowel sounds  MUSCULOSKELETAL: no joint swelling or tenderness to palpation  PSYCH: alert and awake; calm

## 2025-01-20 NOTE — CHART NOTE - NSCHARTNOTEFT_GEN_A_CORE
Patient seen and examined at bedside. Patient lying in bed comfortably. No medications required within the 24-hour period of 8 a.m. to 8 a.m. Met with wife and son at bedside and spoke via phone with daughter, who confirmed that the plan is for home hospice. Symptoms are well controlled with the current regimen, and goals are established. Palliative care consult appreciated and completed. The palliative care team will sign off. The team remains available if additional services are needed. Please reconsult as needed.      Ester Gray MD   Geriatrics and Palliative Care Attending   [facility]

## 2025-01-21 LAB
CULTURE RESULTS: SIGNIFICANT CHANGE UP
SPECIMEN SOURCE: SIGNIFICANT CHANGE UP

## 2025-01-21 PROCEDURE — 99231 SBSQ HOSP IP/OBS SF/LOW 25: CPT

## 2025-01-21 RX ADMIN — Medication 10 GRAM(S): at 12:47

## 2025-01-21 RX ADMIN — OLANZAPINE 5 MILLIGRAM(S): 10 TABLET, FILM COATED ORAL at 20:00

## 2025-01-21 RX ADMIN — GABAPENTIN 400 MILLIGRAM(S): 800 TABLET ORAL at 21:23

## 2025-01-21 RX ADMIN — ONDANSETRON 8 MILLIGRAM(S): 4 TABLET, ORALLY DISINTEGRATING ORAL at 23:20

## 2025-01-21 RX ADMIN — SODIUM CHLORIDE 60 MILLILITER(S): 9 INJECTION, SOLUTION INTRAVENOUS at 20:00

## 2025-01-21 RX ADMIN — GABAPENTIN 400 MILLIGRAM(S): 800 TABLET ORAL at 12:47

## 2025-01-21 RX ADMIN — METHADONE HYDROCHLORIDE 10 MILLIGRAM(S): 5 SOLUTION ORAL at 06:13

## 2025-01-21 RX ADMIN — ASPIRIN 81 MILLIGRAM(S): 81 TABLET, COATED ORAL at 12:47

## 2025-01-21 RX ADMIN — ONDANSETRON 8 MILLIGRAM(S): 4 TABLET, ORALLY DISINTEGRATING ORAL at 16:00

## 2025-01-21 RX ADMIN — Medication 125 MICROGRAM(S): at 06:13

## 2025-01-21 RX ADMIN — METHADONE HYDROCHLORIDE 10 MILLIGRAM(S): 5 SOLUTION ORAL at 12:47

## 2025-01-21 RX ADMIN — GABAPENTIN 400 MILLIGRAM(S): 800 TABLET ORAL at 06:12

## 2025-01-21 RX ADMIN — METHADONE HYDROCHLORIDE 10 MILLIGRAM(S): 5 SOLUTION ORAL at 21:23

## 2025-01-21 NOTE — PROGRESS NOTE ADULT - PROBLEM SELECTOR PLAN 2
- Hx unresectable metastatic pancreatic adenocarcinoma s/p multiple lines of therapy including FOLFIRINOX, chemoRT with Xeloda, and FOLFOX x2 cycles but stopped due to intolerance. Had a celiac plexus block as well per review of prior medical record.  - patient was following at [facility] but was informed no further treatment options.   - patient was also following with Palliative Care team at [facility] with Dr. Ramirez who has been managing his pain, was referred for home hospice ([facility]), but put on hold as patient was undergoing second opinion and evaluation with Dr. Amol Fernandez at [facility]. Patient is unlikely a candidate for further treatment.   - appreciate palliative input:   - c/w IV zofran 8mg q8h ATC, c/w zyprexa 2.5mg po qhs   - IV Ofirmev 1G q8h PRN mild pain, dilaudid 1mg IV q4h PRN mod pain, 1.5mg IV q4h PRN severe pain   - If able to tolerate po, can restart home dilaudid dose: 4mg po q4h PRN and continue methadone 10mg po TID  - plan now for home Hospice, referral sent to [facility]. CM will facilitate

## 2025-01-21 NOTE — PROGRESS NOTE ADULT - SUBJECTIVE AND OBJECTIVE BOX
Patient is a 69y old  Male who presents with a chief complaint of Syncope (20 Jan 2025 12:47)      SUBJECTIVE / OVERNIGHT EVENTS: Patient seen and examined at bedside. He appears conformable. No events overnight.     ROS:  All other review of systems negative    Allergies    No Known Allergies    Intolerances        MEDICATIONS  (STANDING):  aspirin  chewable 81 milliGRAM(s) Oral daily  dextrose 5% + sodium chloride 0.9%. 1000 milliLiter(s) (60 mL/Hr) IV Continuous <Continuous>  digoxin     Tablet 125 MICROGram(s) Oral daily  gabapentin 400 milliGRAM(s) Oral every 8 hours  influenza  Vaccine (HIGH DOSE) 0.5 milliLiter(s) IntraMuscular once  lactulose Syrup 10 Gram(s) Oral daily  methadone    Tablet 10 milliGRAM(s) Oral three times a day  OLANZapine Disintegrating Tablet 5 milliGRAM(s) Oral <User Schedule>  ondansetron Injectable 8 milliGRAM(s) IV Push every 8 hours    MEDICATIONS  (PRN):  acetaminophen     Tablet .. 650 milliGRAM(s) Oral every 6 hours PRN Temp greater or equal to 38C (100.4F), Mild Pain (1 - 3)  aluminum hydroxide/magnesium hydroxide/simethicone Suspension 30 milliLiter(s) Oral every 4 hours PRN Dyspepsia  HYDROmorphone  Injectable 1.5 milliGRAM(s) IV Push every 4 hours PRN Severe Pain (7 - 10)  HYDROmorphone  Injectable 1 milliGRAM(s) IV Push every 4 hours PRN Moderate Pain (4 - 6)  melatonin 3 milliGRAM(s) Oral at bedtime PRN Insomnia      Vital Signs Last 24 Hrs  T(C): 36.3 (21 Jan 2025 13:04), Max: 36.8 (21 Jan 2025 00:24)  T(F): 97.4 (21 Jan 2025 13:04), Max: 98.2 (21 Jan 2025 00:24)  HR: 69 (21 Jan 2025 13:04) (60 - 69)  BP: 105/68 (21 Jan 2025 13:04) (99/68 - 112/74)  BP(mean): --  RR: 18 (21 Jan 2025 13:04) (18 - 18)  SpO2: 95% (21 Jan 2025 13:04) (94% - 96%)    Parameters below as of 21 Jan 2025 04:14  Patient On (Oxygen Delivery Method): room air      CAPILLARY BLOOD GLUCOSE        I&O's Summary    20 Jan 2025 07:01  -  21 Jan 2025 07:00  --------------------------------------------------------  IN: 980 mL / OUT: 0 mL / NET: 980 mL        PHYSICAL EXAM:  GENERAL: cachectic   HEAD:  Atraumatic, Normocephalic  EYES: EOMI, PERRLA, conjunctiva and sclera clear  NECK: Supple, No JVD  CHEST/LUNG: Clear to auscultation bilaterally; No wheeze  HEART: Regular rate and rhythm; No murmurs, rubs, or gallops  ABDOMEN: Soft, Nontender, Nondistended; Bowel sounds present  EXTREMITIES:  no edema     LABS:                    RADIOLOGY & ADDITIONAL TESTS:        Care Discussed with Consultants/Other Providers: Medicine ACP     Case Discussed with dell Ag [phone]

## 2025-01-22 LAB
ANION GAP SERPL CALC-SCNC: 8 MMOL/L — SIGNIFICANT CHANGE UP (ref 5–17)
BASOPHILS # BLD AUTO: 0.02 K/UL — SIGNIFICANT CHANGE UP (ref 0–0.2)
BASOPHILS NFR BLD AUTO: 0.4 % — SIGNIFICANT CHANGE UP (ref 0–2)
BUN SERPL-MCNC: 13 MG/DL — SIGNIFICANT CHANGE UP (ref 7–23)
CALCIUM SERPL-MCNC: 8.5 MG/DL — SIGNIFICANT CHANGE UP (ref 8.4–10.5)
CHLORIDE SERPL-SCNC: 106 MMOL/L — SIGNIFICANT CHANGE UP (ref 96–108)
CO2 SERPL-SCNC: 22 MMOL/L — SIGNIFICANT CHANGE UP (ref 22–31)
CREAT SERPL-MCNC: 0.8 MG/DL — SIGNIFICANT CHANGE UP (ref 0.5–1.3)
EGFR: 95 ML/MIN/1.73M2 — SIGNIFICANT CHANGE UP
EOSINOPHIL # BLD AUTO: 0.08 K/UL — SIGNIFICANT CHANGE UP (ref 0–0.5)
EOSINOPHIL NFR BLD AUTO: 1.5 % — SIGNIFICANT CHANGE UP (ref 0–6)
GLUCOSE SERPL-MCNC: 134 MG/DL — HIGH (ref 70–99)
HCT VFR BLD CALC: 29 % — LOW (ref 39–50)
HGB BLD-MCNC: 9.6 G/DL — LOW (ref 13–17)
IMM GRANULOCYTES NFR BLD AUTO: 0.8 % — SIGNIFICANT CHANGE UP (ref 0–0.9)
LYMPHOCYTES # BLD AUTO: 0.32 K/UL — LOW (ref 1–3.3)
LYMPHOCYTES # BLD AUTO: 6.1 % — LOW (ref 13–44)
MCHC RBC-ENTMCNC: 31.2 PG — SIGNIFICANT CHANGE UP (ref 27–34)
MCHC RBC-ENTMCNC: 33.1 G/DL — SIGNIFICANT CHANGE UP (ref 32–36)
MCV RBC AUTO: 94.2 FL — SIGNIFICANT CHANGE UP (ref 80–100)
MONOCYTES # BLD AUTO: 0.41 K/UL — SIGNIFICANT CHANGE UP (ref 0–0.9)
MONOCYTES NFR BLD AUTO: 7.8 % — SIGNIFICANT CHANGE UP (ref 2–14)
NEUTROPHILS # BLD AUTO: 4.41 K/UL — SIGNIFICANT CHANGE UP (ref 1.8–7.4)
NEUTROPHILS NFR BLD AUTO: 83.4 % — HIGH (ref 43–77)
NRBC # BLD: 0 /100 WBCS — SIGNIFICANT CHANGE UP (ref 0–0)
NRBC BLD-RTO: 0 /100 WBCS — SIGNIFICANT CHANGE UP (ref 0–0)
PLATELET # BLD AUTO: 103 K/UL — LOW (ref 150–400)
POTASSIUM SERPL-MCNC: 3.9 MMOL/L — SIGNIFICANT CHANGE UP (ref 3.5–5.3)
POTASSIUM SERPL-SCNC: 3.9 MMOL/L — SIGNIFICANT CHANGE UP (ref 3.5–5.3)
RBC # BLD: 3.08 M/UL — LOW (ref 4.2–5.8)
RBC # FLD: 14.8 % — HIGH (ref 10.3–14.5)
SODIUM SERPL-SCNC: 136 MMOL/L — SIGNIFICANT CHANGE UP (ref 135–145)
WBC # BLD: 5.28 K/UL — SIGNIFICANT CHANGE UP (ref 3.8–10.5)
WBC # FLD AUTO: 5.28 K/UL — SIGNIFICANT CHANGE UP (ref 3.8–10.5)

## 2025-01-22 PROCEDURE — 99223 1ST HOSP IP/OBS HIGH 75: CPT | Mod: GC

## 2025-01-22 PROCEDURE — 99233 SBSQ HOSP IP/OBS HIGH 50: CPT

## 2025-01-22 RX ADMIN — GABAPENTIN 400 MILLIGRAM(S): 800 TABLET ORAL at 06:03

## 2025-01-22 RX ADMIN — GABAPENTIN 400 MILLIGRAM(S): 800 TABLET ORAL at 14:05

## 2025-01-22 RX ADMIN — GABAPENTIN 400 MILLIGRAM(S): 800 TABLET ORAL at 21:23

## 2025-01-22 RX ADMIN — OLANZAPINE 5 MILLIGRAM(S): 10 TABLET, FILM COATED ORAL at 21:23

## 2025-01-22 RX ADMIN — METHADONE HYDROCHLORIDE 10 MILLIGRAM(S): 5 SOLUTION ORAL at 21:23

## 2025-01-22 RX ADMIN — ASPIRIN 81 MILLIGRAM(S): 81 TABLET, COATED ORAL at 14:03

## 2025-01-22 RX ADMIN — ONDANSETRON 8 MILLIGRAM(S): 4 TABLET, ORALLY DISINTEGRATING ORAL at 18:06

## 2025-01-22 RX ADMIN — ONDANSETRON 8 MILLIGRAM(S): 4 TABLET, ORALLY DISINTEGRATING ORAL at 08:49

## 2025-01-22 RX ADMIN — Medication 10 GRAM(S): at 14:04

## 2025-01-22 RX ADMIN — METHADONE HYDROCHLORIDE 10 MILLIGRAM(S): 5 SOLUTION ORAL at 14:05

## 2025-01-22 RX ADMIN — Medication 125 MICROGRAM(S): at 06:03

## 2025-01-22 RX ADMIN — METHADONE HYDROCHLORIDE 10 MILLIGRAM(S): 5 SOLUTION ORAL at 06:03

## 2025-01-22 RX ADMIN — ONDANSETRON 8 MILLIGRAM(S): 4 TABLET, ORALLY DISINTEGRATING ORAL at 21:23

## 2025-01-22 NOTE — CONSULT NOTE ADULT - ATTENDING COMMENTS
69y /o Mandarin speaking male with metastatic pancreatic adenoca s/p RT and palliative chemo, c/b duodenal obstruction, s/p sphincterotomy and biliary and duodenal stent, presented to IR for therapeutic paracentesis c/b possible syncopal episode post-paracentesis. Goals of care discussed with the patient, wife, son-in-law and daughter with the help of mandrin speaking .  Patient is able to walk small distances with a rolling walker.  He is eating better.  He wishes to go home, continue physical therapy at home and discuss further options with Dr. Fernandez as outpatient.  Home hospice was discussed but patient wishes to further explore disease modifying treatments.  Plan was discussed with the team

## 2025-01-22 NOTE — PROGRESS NOTE ADULT - SUBJECTIVE AND OBJECTIVE BOX
Patient is a 69y old  Male who presents with a chief complaint of Syncope (22 Jan 2025 13:16)      SUBJECTIVE / OVERNIGHT EVENTS: Patient seen and examined at bedside. he is at baseline mental status intermittently confused. is lethargic on exam     ROS:  All other review of systems negative    Allergies    No Known Allergies    Intolerances        MEDICATIONS  (STANDING):  aspirin  chewable 81 milliGRAM(s) Oral daily  dextrose 5% + sodium chloride 0.9%. 1000 milliLiter(s) (60 mL/Hr) IV Continuous <Continuous>  digoxin     Tablet 125 MICROGram(s) Oral daily  gabapentin 400 milliGRAM(s) Oral every 8 hours  influenza  Vaccine (HIGH DOSE) 0.5 milliLiter(s) IntraMuscular once  lactulose Syrup 10 Gram(s) Oral daily  methadone    Tablet 10 milliGRAM(s) Oral three times a day  OLANZapine Disintegrating Tablet 5 milliGRAM(s) Oral <User Schedule>  ondansetron Injectable 8 milliGRAM(s) IV Push every 8 hours    MEDICATIONS  (PRN):  acetaminophen     Tablet .. 650 milliGRAM(s) Oral every 6 hours PRN Temp greater or equal to 38C (100.4F), Mild Pain (1 - 3)  aluminum hydroxide/magnesium hydroxide/simethicone Suspension 30 milliLiter(s) Oral every 4 hours PRN Dyspepsia  HYDROmorphone  Injectable 1 milliGRAM(s) IV Push every 4 hours PRN Moderate Pain (4 - 6)  HYDROmorphone  Injectable 1.5 milliGRAM(s) IV Push every 4 hours PRN Severe Pain (7 - 10)  melatonin 3 milliGRAM(s) Oral at bedtime PRN Insomnia      Vital Signs Last 24 Hrs  T(C): 36.8 (22 Jan 2025 12:08), Max: 37.2 (21 Jan 2025 16:12)  T(F): 98.3 (22 Jan 2025 12:08), Max: 99 (21 Jan 2025 16:12)  HR: 59 (22 Jan 2025 12:08) (59 - 79)  BP: 121/79 (22 Jan 2025 12:08) (106/77 - 121/79)  BP(mean): --  RR: 18 (22 Jan 2025 12:08) (18 - 18)  SpO2: 95% (22 Jan 2025 12:08) (94% - 100%)    Parameters below as of 22 Jan 2025 05:04  Patient On (Oxygen Delivery Method): room air      CAPILLARY BLOOD GLUCOSE        I&O's Summary    21 Jan 2025 07:01  -  22 Jan 2025 07:00  --------------------------------------------------------  IN: 900 mL / OUT: 0 mL / NET: 900 mL        PHYSICAL EXAM:  GENERAL: cachectic   HEAD:  Atraumatic, Normocephalic  EYES: EOMI, PERRLA, conjunctiva and sclera clear  NECK: Supple, No JVD  CHEST/LUNG: Clear to auscultation bilaterally; No wheeze  HEART: Regular rate and rhythm; No murmurs, rubs, or gallops  ABDOMEN: Soft, Nontender, + distended   EXTREMITIES:  no edema   NEUROLOGY: AAOx1, lethargic     LABS:                        9.6    5.28  )-----------( 103      ( 22 Jan 2025 05:44 )             29.0     01-22    136  |  106  |  13  ----------------------------<  134[H]  3.9   |  22  |  0.80    Ca    8.5      22 Jan 2025 05:44            Urinalysis Basic - ( 22 Jan 2025 05:44 )    Color: x / Appearance: x / SG: x / pH: x  Gluc: 134 mg/dL / Ketone: x  / Bili: x / Urobili: x   Blood: x / Protein: x / Nitrite: x   Leuk Esterase: x / RBC: x / WBC x   Sq Epi: x / Non Sq Epi: x / Bacteria: x        RADIOLOGY & ADDITIONAL TESTS:    Care Discussed with Consultants/Other Providers: Dr. Fernandez, inpatient oncology and palliative care     Case Discussed with dell major

## 2025-01-22 NOTE — CONSULT NOTE ADULT - SUBJECTIVE AND OBJECTIVE BOX
HPI:  Mandarin speaking, family at bedside translating per Pt/family preference     69y M PMH HTN, HLD, Afib on Eliquis, CAD s/p 3 stents, metastatic pancreatic cancer with duodenal obstruction, recent admission at [facility] to surgery for duodenal obstruction s/p stent placement  noted to have increased ascites so sent to IR suite today for therapeutic paracentesis (2.2 L removed) noted to have syncopal/near syncopal episode post para so code blue code called and pt was sent to ED for further eval.  Patient had completed paracentesis, had been discharged from IR and was being wheeled out by daughter when it was noticed that patient was complaining of chest discomfort and became unresponsive, though no loss of consciousness.  CODE BLUE was called and patient was brought to the emergency department for further evaluation.  Family reports that patient is not being offered chemotherapy by [facility] due to metastasis.  Despite poor prognosis, family maintains full code status for patient.     At time of encounter pt frail appearing, endorsing nausea, recently given zofran. family (son & daughter) at bedside     ROS: Denies HA, SOB, palpitation, N/V/D, fever, cough, chills, dizziness, sick contact, change in bowel or urinary habits   A 10-system ROS was performed and is negative except as noted above and/or in the HPI.      ED: IVF, Gabapentin, Methadone, placed on tele. Trop neg, BNP minima elevated. CXR neg  (16 Jan 2025 20:14)      14 point ROS otherwise negative    PAST MEDICAL & SURGICAL HISTORY:  Hypertension      CAD (coronary artery disease)      Stented coronary artery  x 1 stent      Pancreatic cancer      Atrial fibrillation      Stented coronary artery          Allergies    No Known Allergies    Intolerances        MEDICATIONS  (STANDING):  aspirin  chewable 81 milliGRAM(s) Oral daily  dextrose 5% + sodium chloride 0.9%. 1000 milliLiter(s) (60 mL/Hr) IV Continuous <Continuous>  digoxin     Tablet 125 MICROGram(s) Oral daily  gabapentin 400 milliGRAM(s) Oral every 8 hours  influenza  Vaccine (HIGH DOSE) 0.5 milliLiter(s) IntraMuscular once  lactulose Syrup 10 Gram(s) Oral daily  methadone    Tablet 10 milliGRAM(s) Oral three times a day  OLANZapine Disintegrating Tablet 5 milliGRAM(s) Oral <User Schedule>  ondansetron Injectable 8 milliGRAM(s) IV Push every 8 hours    MEDICATIONS  (PRN):  acetaminophen     Tablet .. 650 milliGRAM(s) Oral every 6 hours PRN Temp greater or equal to 38C (100.4F), Mild Pain (1 - 3)  aluminum hydroxide/magnesium hydroxide/simethicone Suspension 30 milliLiter(s) Oral every 4 hours PRN Dyspepsia  HYDROmorphone  Injectable 1.5 milliGRAM(s) IV Push every 4 hours PRN Severe Pain (7 - 10)  HYDROmorphone  Injectable 1 milliGRAM(s) IV Push every 4 hours PRN Moderate Pain (4 - 6)  melatonin 3 milliGRAM(s) Oral at bedtime PRN Insomnia      FAMILY HISTORY:      SOCIAL HISTORY: No EtOH, no tobacco        VITALS:   T(F): 98.3 (01-22-25 @ 12:08), Max: 99 (01-21-25 @ 16:12)  HR: 59 (01-22-25 @ 12:08)  BP: 121/79 (01-22-25 @ 12:08)  RR: 18 (01-22-25 @ 12:08)  SpO2: 95% (01-22-25 @ 12:08)  Wt(kg): --    PHYSICAL EXAM    GENERAL: cachectic   HEAD:  Atraumatic, Normocephalic  EYES: EOMI, PERRLA, conjunctiva and sclera clear  NECK: Supple, No JVD  CHEST/LUNG: Clear to auscultation bilaterally; No wheeze  HEART: Regular rate and rhythm; No murmurs, rubs, or gallops  ABDOMEN: Soft, Nontender, Nondistended; Bowel sounds present  EXTREMITIES:  no edema     LABS:                         9.6    5.28  )-----------( 103      ( 22 Jan 2025 05:44 )             29.0     01-22    136  |  106  |  13  ----------------------------<  134[H]  3.9   |  22  |  0.80    Ca    8.5      22 Jan 2025 05:44          Peritoneal  01-16 @ 15:07   No growth at 5 days  --    polymorphonuclear leukocytes seen  No organisms seen  by cytocentrifuge          IMAGING:

## 2025-01-22 NOTE — CONSULT NOTE ADULT - ASSESSMENT
69y /o Mandarin speaking male with PMH HTN, HLD, paroxysmal afib on Eliquis, CAD s/p 3 stents, metastatic pancreatic adenoca s/p RT and palliative chemo, c/b duodenal obstruction, recent admission at [facility] (12/31/24-1/3/25) for management of duodenal obstruction with early signs of biliary obstruction, deemed high risk for surgical bypass due to ascites and hx of palliative radiation. 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       Recommendations:  - Please assess whether patient could qualify for rehabilitation. If not, he would not qualify for chemotherapy and hospice is indicated.  - Can follow up with Dr. Fernandez at [facility] upon hospital discharge pending C discussions    NOTE INCOMPLETE UNTIL ATTENDING SIGNS    ***************************************************************  Hoa Ortega, PGY5  Fellow Hematology/Oncology  pager: [phone]   Available on [employer] Teams  After 5pm or on weekends please contact  to page on-call fellow   ***************************************************************   69y /o Mandarin speaking male with PMH HTN, HLD, paroxysmal afib on Eliquis, CAD s/p 3 stents, metastatic pancreatic adenoca s/p RT and palliative chemo, c/b duodenal obstruction, recent admission at [facility] (12/31/24-1/3/25) for management of duodenal obstruction with early signs of biliary obstruction, deemed high risk for surgical bypass due to ascites and hx of palliative radiation. 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 Oncology consulted for further recommendations.    # Metastatic Pancreatic Cancer History  - Follows with Dr. Fernandez at [facility]  - Diagnosed 11/4/22 with borderline resectable disease and received by 3 cycles of neoadjuvant FOLFIRINOX  - He had interval enlargement and was started 2/24/23 on Xeloda/RT  - 6/5/23 he was deemed unresectable and started FOLFOX x 2 cycles  - Summer 2024 started 2nd line [place]/Abraxane and SBRT  - 11/2024 declined treatment and has been on surveillance without chemotherapy    1/22/25: Had extensive GoC discussions with family with patient's wife, daughter and son in law using Mandarin  Joi #[id number]. Patient participated. Had just walked with a walker with PT and was recommended home PT. Patient endorsed wanting to discuss chemotherapy and other treatment options with Dr. Fernandez when he is discharged. Family is very involved and engaged in patient's care.    Recommendations:  - No plans for inpatient treatment. Prognosis remains guarded and patient is very weak and malnourished but patient and family are not ready to be made hospice at this time.  - Oncology will sign off. Please let oncology know when patient leaves the hospital to advise Dr. Fernandez.  - Can follow up with Dr. Fernandez at [facility] upon hospital discharge. Please discharge patient with home PT.    NOTE INCOMPLETE UNTIL ATTENDING SIGNS    ***************************************************************  Hoa Ortega, PGY5  Fellow Hematology/Oncology  pager: [phone]   Available on [employer] Teams  After 5pm or on weekends please contact  to page on-call fellow   ***************************************************************   69y /o Mandarin speaking male with PMH HTN, HLD, paroxysmal afib on Eliquis, CAD s/p 3 stents, metastatic pancreatic adenoca s/p RT and palliative chemo, c/b duodenal obstruction, recent admission at [facility] (12/31/24-1/3/25) for management of duodenal obstruction with early signs of biliary obstruction, deemed high risk for surgical bypass due to ascites and hx of palliative radiation. 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 Oncology consulted for further recommendations.    # Metastatic Pancreatic Cancer History  - Follows with Dr. Fernandez at [facility]  - Diagnosed 11/4/22 with borderline resectable disease and received by 3 cycles of neoadjuvant FOLFIRINOX  - He had interval enlargement and was started 2/24/23 on Xeloda/RT  - 6/5/23 he was deemed unresectable and started FOLFOX x 2 cycles  - Summer 2024 started 2nd line [place]/Abraxane and SBRT  - 11/2024 declined treatment and has been on surveillance without chemotherapy    1/22/25: Had extensive GoC discussions with family with patient's wife, daughter and son in law using Mandarin  Joi #[id number]. Patient participated. Had just walked with a walker with PT and was recommended home PT. Patient endorsed wanting to discuss chemotherapy and other treatment options with Dr. Fernandez when he is discharged. Family is very involved and engaged in patient's care.    Recommendations:  - No plans for inpatient treatment. Prognosis remains guarded and patient is very weak and malnourished but patient and family are not ready to be made hospice at this time.  - Oncology will sign off. Please let oncology know when patient leaves the hospital to advise Dr. Fernandez.  - Can follow up with Dr. Fernandez at [facility] upon hospital discharge. Please discharge patient with home PT.    ***************************************************************  Hoa Ortega, PGY5  Fellow Hematology/Oncology  pager: [phone]   Available on [employer] Teams  After 5pm or on weekends please contact  to page on-call fellow   ***************************************************************

## 2025-01-22 NOTE — PROGRESS NOTE ADULT - PROBLEM SELECTOR PLAN 7
- multiple GOC discussion had with the daughter (Kimberli), pending final decision about home hospice after patient is seen by oncology team inpatient    Dispo: home hospice

## 2025-01-22 NOTE — CONSULT NOTE ADULT - CONVERSATION DETAILS
See Assessment and Plan    Time Spent: 25 minuntes
Clinical condition reviewed.  Palliative service introduced. Family familiar w Palliative care as they actively follow up w Palliative team at [facility] for management of pts pain.     Discussed concern of continued clinical decline w inability to tolerate po/and overall poor performance status.  Family are aware of pt's advanced and terminal disease, however they have remained hopeful for further treatment options (Even with palliative intent) and await discussion at their next appointment scheduled for 1/28 w Onc.      Discussed pt w poor performance status and inability to tolerate po will not likely have further options for disease directed therapy as the risks outweigh benefits. Family understand.    Discussed concern that ongoing invasive measures will not change pt's disease trajectory.   Discussed advanced directives- highlighting invasive life prolonging measures would only prolong pt's dying process and may contribute to further suffering.   Attempt at CPR will likely impose a greater physical burden than clinical benefit.   Family tearful, but understand and state they will rediscuss consideration of DNR/DNI tonight when Minyan can come after work.  For now they would like to cont medical optimization of pt w continuation of full code status per his previously stated wishes.     Discussed Hospice philosophy of care in consideration for GIP vs home. Family to discuss this evening.

## 2025-01-22 NOTE — PROGRESS NOTE ADULT - PROBLEM SELECTOR PLAN 2
- Hx unresectable metastatic pancreatic adenocarcinoma s/p multiple lines of therapy including FOLFIRINOX, chemoRT with Xeloda, and FOLFOX x2 cycles but stopped due to intolerance. Had a celiac plexus block as well per review of prior medical record.  - patient was following at [facility] but was informed no further treatment options.   - patient was also following with Palliative Care team at [facility] with Dr. Ramirez who has been managing his pain, was referred for home hospice ([facility]), but put on hold as patient was undergoing second opinion and evaluation with Dr. Amol Fernandez at [facility]. Patient is unlikely a candidate for further treatment.   - appreciate palliative input:   - c/w IV zofran 8mg q8h ATC, c/w zyprexa 2.5mg po qhs   - IV Ofirmev 1G q8h PRN mild pain, dilaudid 1mg IV q4h PRN mod pain, 1.5mg IV q4h PRN severe pain   - If able to tolerate po, can restart home dilaudid dose: 4mg po q4h PRN and continue methadone 10mg po TID  - d/w Patient's dtr in detail about hospice, also d/w Dr. Fernandez (out oncologist) regarding current worsened clinical status. d/w palliative care as well : patient is a candidate for home hospice. dtr would like to speak with oncology team prior to final decision about home hospice.

## 2025-01-23 LAB
ALBUMIN SERPL ELPH-MCNC: 2.4 G/DL — LOW (ref 3.3–5)
ALP SERPL-CCNC: 578 U/L — HIGH (ref 40–120)
ALT FLD-CCNC: 29 U/L — SIGNIFICANT CHANGE UP (ref 10–45)
ANION GAP SERPL CALC-SCNC: 7 MMOL/L — SIGNIFICANT CHANGE UP (ref 5–17)
AST SERPL-CCNC: 52 U/L — HIGH (ref 10–40)
BILIRUB SERPL-MCNC: 1.3 MG/DL — HIGH (ref 0.2–1.2)
BUN SERPL-MCNC: 11 MG/DL — SIGNIFICANT CHANGE UP (ref 7–23)
CALCIUM SERPL-MCNC: 8.2 MG/DL — LOW (ref 8.4–10.5)
CHLORIDE SERPL-SCNC: 108 MMOL/L — SIGNIFICANT CHANGE UP (ref 96–108)
CO2 SERPL-SCNC: 21 MMOL/L — LOW (ref 22–31)
CREAT SERPL-MCNC: 0.67 MG/DL — SIGNIFICANT CHANGE UP (ref 0.5–1.3)
EGFR: 100 ML/MIN/1.73M2 — SIGNIFICANT CHANGE UP
GLUCOSE SERPL-MCNC: 341 MG/DL — HIGH (ref 70–99)
HCT VFR BLD CALC: 28.7 % — LOW (ref 39–50)
HGB BLD-MCNC: 9.6 G/DL — LOW (ref 13–17)
MCHC RBC-ENTMCNC: 31.7 PG — SIGNIFICANT CHANGE UP (ref 27–34)
MCHC RBC-ENTMCNC: 33.4 G/DL — SIGNIFICANT CHANGE UP (ref 32–36)
MCV RBC AUTO: 94.7 FL — SIGNIFICANT CHANGE UP (ref 80–100)
NRBC # BLD: 0 /100 WBCS — SIGNIFICANT CHANGE UP (ref 0–0)
NRBC BLD-RTO: 0 /100 WBCS — SIGNIFICANT CHANGE UP (ref 0–0)
PLATELET # BLD AUTO: 96 K/UL — LOW (ref 150–400)
POTASSIUM SERPL-MCNC: 3.7 MMOL/L — SIGNIFICANT CHANGE UP (ref 3.5–5.3)
POTASSIUM SERPL-SCNC: 3.7 MMOL/L — SIGNIFICANT CHANGE UP (ref 3.5–5.3)
PROT SERPL-MCNC: 4.8 G/DL — LOW (ref 6–8.3)
RBC # BLD: 3.03 M/UL — LOW (ref 4.2–5.8)
RBC # FLD: 14.8 % — HIGH (ref 10.3–14.5)
SODIUM SERPL-SCNC: 136 MMOL/L — SIGNIFICANT CHANGE UP (ref 135–145)
WBC # BLD: 4.1 K/UL — SIGNIFICANT CHANGE UP (ref 3.8–10.5)
WBC # FLD AUTO: 4.1 K/UL — SIGNIFICANT CHANGE UP (ref 3.8–10.5)

## 2025-01-23 PROCEDURE — 74018 RADEX ABDOMEN 1 VIEW: CPT | Mod: 26

## 2025-01-23 PROCEDURE — 99233 SBSQ HOSP IP/OBS HIGH 50: CPT

## 2025-01-23 RX ORDER — HYDROMORPHONE HYDROCHLORIDE 4 MG/ML
8 INJECTION, SOLUTION INTRAMUSCULAR; INTRAVENOUS; SUBCUTANEOUS EVERY 4 HOURS
Refills: 0 | Status: DISCONTINUED | OUTPATIENT
Start: 2025-01-23 | End: 2025-01-24

## 2025-01-23 RX ORDER — BISACODYL 5 MG
10 TABLET, DELAYED RELEASE (ENTERIC COATED) ORAL ONCE
Refills: 0 | Status: COMPLETED | OUTPATIENT
Start: 2025-01-23 | End: 2025-01-23

## 2025-01-23 RX ORDER — SENNOSIDES 8.6 MG
2 TABLET ORAL AT BEDTIME
Refills: 0 | Status: DISCONTINUED | OUTPATIENT
Start: 2025-01-23 | End: 2025-01-24

## 2025-01-23 RX ORDER — HYDROMORPHONE HYDROCHLORIDE 4 MG/ML
4 INJECTION, SOLUTION INTRAMUSCULAR; INTRAVENOUS; SUBCUTANEOUS EVERY 4 HOURS
Refills: 0 | Status: DISCONTINUED | OUTPATIENT
Start: 2025-01-23 | End: 2025-01-24

## 2025-01-23 RX ORDER — POLYETHYLENE GLYCOL 3350 17 G/17G
17 POWDER, FOR SOLUTION ORAL
Refills: 0 | Status: DISCONTINUED | OUTPATIENT
Start: 2025-01-23 | End: 2025-01-24

## 2025-01-23 RX ORDER — APIXABAN 5 MG/1
5 TABLET, FILM COATED ORAL EVERY 12 HOURS
Refills: 0 | Status: DISCONTINUED | OUTPATIENT
Start: 2025-01-23 | End: 2025-01-24

## 2025-01-23 RX ORDER — METHADONE HYDROCHLORIDE 5 MG/5ML
10 SOLUTION ORAL THREE TIMES A DAY
Refills: 0 | Status: DISCONTINUED | OUTPATIENT
Start: 2025-01-23 | End: 2025-01-24

## 2025-01-23 RX ORDER — ATORVASTATIN CALCIUM 80 MG/1
20 TABLET, FILM COATED ORAL AT BEDTIME
Refills: 0 | Status: DISCONTINUED | OUTPATIENT
Start: 2025-01-23 | End: 2025-01-24

## 2025-01-23 RX ADMIN — POLYETHYLENE GLYCOL 3350 17 GRAM(S): 17 POWDER, FOR SOLUTION ORAL at 17:03

## 2025-01-23 RX ADMIN — Medication 10 MILLIGRAM(S): at 13:13

## 2025-01-23 RX ADMIN — OLANZAPINE 5 MILLIGRAM(S): 10 TABLET, FILM COATED ORAL at 21:22

## 2025-01-23 RX ADMIN — ONDANSETRON 8 MILLIGRAM(S): 4 TABLET, ORALLY DISINTEGRATING ORAL at 16:58

## 2025-01-23 RX ADMIN — APIXABAN 5 MILLIGRAM(S): 5 TABLET, FILM COATED ORAL at 17:04

## 2025-01-23 RX ADMIN — GABAPENTIN 400 MILLIGRAM(S): 800 TABLET ORAL at 21:22

## 2025-01-23 RX ADMIN — METHADONE HYDROCHLORIDE 10 MILLIGRAM(S): 5 SOLUTION ORAL at 04:51

## 2025-01-23 RX ADMIN — GABAPENTIN 400 MILLIGRAM(S): 800 TABLET ORAL at 04:51

## 2025-01-23 RX ADMIN — ASPIRIN 81 MILLIGRAM(S): 81 TABLET, COATED ORAL at 13:12

## 2025-01-23 RX ADMIN — Medication 2 TABLET(S): at 21:22

## 2025-01-23 RX ADMIN — METHADONE HYDROCHLORIDE 10 MILLIGRAM(S): 5 SOLUTION ORAL at 21:21

## 2025-01-23 RX ADMIN — Medication 10 GRAM(S): at 13:11

## 2025-01-23 RX ADMIN — METHADONE HYDROCHLORIDE 10 MILLIGRAM(S): 5 SOLUTION ORAL at 16:58

## 2025-01-23 RX ADMIN — ONDANSETRON 8 MILLIGRAM(S): 4 TABLET, ORALLY DISINTEGRATING ORAL at 09:15

## 2025-01-23 RX ADMIN — ATORVASTATIN CALCIUM 20 MILLIGRAM(S): 80 TABLET, FILM COATED ORAL at 21:21

## 2025-01-23 RX ADMIN — GABAPENTIN 400 MILLIGRAM(S): 800 TABLET ORAL at 13:12

## 2025-01-23 NOTE — PROGRESS NOTE ADULT - PROBLEM SELECTOR PLAN 2
- Hx unresectable metastatic pancreatic adenocarcinoma s/p multiple lines of therapy including FOLFIRINOX, chemoRT with Xeloda, and FOLFOX x2 cycles but stopped due to intolerance. Had a celiac plexus block as well per review of prior medical record.  - patient was following at [facility] but was informed no further treatment options.   - patient was also following with Palliative Care team at [facility] with Dr. Ramirez who has been managing his pain, was referred for home hospice ([facility]), but put on hold as patient was undergoing second opinion and evaluation with Dr. Amol Fernandez at [facility]. Appreciate oncology consult. Pt not interested in hospice at this time, wishes to follow up with Dr. Fernandez.  - appreciate palliative input:   - c/w IV zofran 8mg q8h ATC, c/w zyprexa 5mg po qhs   - methadone 10 mg TID, dilaudid 4mg PO q4h PRN mod pain, 8 mg PO q4h PRN severe pain   - constipation: c/w lactulose, add miralax senna and dulcolax suppository. AXR to eval stool burden

## 2025-01-23 NOTE — CHART NOTE - NSCHARTNOTEFT_GEN_A_CORE
Pt with metastatic pancreatic cancer, syncope. Pt needs a 3:1 commode as pt requires assistance with ambulation and is confined to a room without a bathroom.    [id number]

## 2025-01-23 NOTE — PROGRESS NOTE ADULT - TIME BILLING
Time spent on review of vitals, physical exam, documentation, and discussion of plan of care with patient, patient family and multidisciplinary team.

## 2025-01-23 NOTE — PROGRESS NOTE ADULT - NSPROGADDITIONALINFOA_GEN_ALL_CORE
time spent reviewing prior charts, meds, discussing plan with patient's daughter = 45 minutes    d/w ACP Surekha
DCP: Home PT, possibly 1/24 if abdominal pain controlled on PO regimen, having BM
time spent reviewing prior charts, meds, discussing plan with patient= 55 min     d/w Medicine ACP Bonita
time spent reviewing prior charts, meds, discussing plan with patient= 30 min     d.w Medicine ACP Radha
time spent reviewing prior charts, meds, discussing plan with patient's daughter = 52 minutes    d/w ACP Tamie

## 2025-01-23 NOTE — PROGRESS NOTE ADULT - PROBLEM SELECTOR PLAN 4
- continue ASA  - LFTs stable, resume statin
- continue ASA  - hold statin

## 2025-01-23 NOTE — PROGRESS NOTE ADULT - NUTRITIONAL ASSESSMENT
This patient has been assessed with a concern for Malnutrition and has been determined to have a diagnosis/diagnoses of Severe protein-calorie malnutrition and Underweight (BMI < 19).    This patient is being managed with:   Diet Regular-  Soft and Bite Sized (SOFTBTSZ)  Supplement Feeding Modality:  Oral  Ensure Enlive Cans or Servings Per Day:  1       Frequency:  Daily  Entered: Jan 16 2025 10:28PM  

## 2025-01-23 NOTE — PROGRESS NOTE ADULT - PROBLEM SELECTOR PROBLEM 4
CAD S/P percutaneous coronary angioplasty

## 2025-01-23 NOTE — PROGRESS NOTE ADULT - PROBLEM SELECTOR PLAN 1
- suspected syncopal episode s/p paracentesis which may have been due to transient orthostatic hypotension due to fluid shift in setting of severe deconditioning and cachexia  - no event on tele ; now d/checo
- suspected syncopal episode s/p paracentesis which may have been due to transient orthostatic hypotension due to fluid shift in setting of severe deconditioning and cachexia  - no event on tele ; now d/checo
- suspected syncopal episode s/p paracentesis which may have been due to transient orthostatic hypotension due to fluid shift in setting of severe deconditioning and cachexia  - no event on tele overnight  - will d/c tele monitoring and TTE as it will unlikely change inpatient management
- suspected syncopal episode s/p paracentesis which may have been due to transient orthostatic hypotension due to fluid shift in setting of severe deconditioning and cachexia  - no event on tele ; now d/checo

## 2025-01-23 NOTE — PROGRESS NOTE ADULT - SUBJECTIVE AND OBJECTIVE BOX
[facility] Division of Hospital Medicine  Ora Brar MD  Available via [employer] Teams    SUBJECTIVE / OVERNIGHT EVENTS:  No acute events overnight. Walked with PT yesterday. PT and family report no BM in 3d. Intermittent nausea, no vomiting. Toelrating PO. Also with worsening abdominal pain.    MEDICATIONS  (STANDING):  apixaban 5 milliGRAM(s) Oral every 12 hours  aspirin  chewable 81 milliGRAM(s) Oral daily  digoxin     Tablet 125 MICROGram(s) Oral daily  gabapentin 400 milliGRAM(s) Oral every 8 hours  influenza  Vaccine (HIGH DOSE) 0.5 milliLiter(s) IntraMuscular once  lactulose Syrup 10 Gram(s) Oral daily  methadone    Tablet 10 milliGRAM(s) Oral three times a day  OLANZapine Disintegrating Tablet 5 milliGRAM(s) Oral <User Schedule>  ondansetron Injectable 8 milliGRAM(s) IV Push every 8 hours  polyethylene glycol 3350 17 Gram(s) Oral two times a day  senna 2 Tablet(s) Oral at bedtime    MEDICATIONS  (PRN):  acetaminophen     Tablet .. 650 milliGRAM(s) Oral every 6 hours PRN Temp greater or equal to 38C (100.4F), Mild Pain (1 - 3)  aluminum hydroxide/magnesium hydroxide/simethicone Suspension 30 milliLiter(s) Oral every 4 hours PRN Dyspepsia  HYDROmorphone   Tablet 4 milliGRAM(s) Oral every 4 hours PRN Moderate Pain (4 - 6)  HYDROmorphone   Tablet 8 milliGRAM(s) Oral every 4 hours PRN Severe Pain (7 - 10)  melatonin 3 milliGRAM(s) Oral at bedtime PRN Insomnia      I&O's Summary    22 Jan 2025 07:01  -  23 Jan 2025 07:00  --------------------------------------------------------  IN: 390 mL / OUT: 0 mL / NET: 390 mL    23 Jan 2025 07:01  -  23 Jan 2025 13:24  --------------------------------------------------------  IN: 120 mL / OUT: 0 mL / NET: 120 mL        PHYSICAL EXAM:  Vital Signs Last 24 Hrs  T(C): 36.7 (23 Jan 2025 11:51), Max: 36.7 (23 Jan 2025 11:51)  T(F): 98.1 (23 Jan 2025 11:51), Max: 98.1 (23 Jan 2025 11:51)  HR: 59 (23 Jan 2025 11:51) (53 - 82)  BP: 111/78 (23 Jan 2025 11:51) (103/68 - 118/77)  BP(mean): --  RR: 18 (23 Jan 2025 11:51) (18 - 19)  SpO2: 95% (23 Jan 2025 11:51) (94% - 97%)    Parameters below as of 23 Jan 2025 04:30  Patient On (Oxygen Delivery Method): room air      PHYSICAL EXAM:  GENERAL: cachectic   HEAD:  Atraumatic, Normocephalic  EYES: EOMI, conjunctiva and sclera clear  NECK: Supple, No JVD  CHEST/LUNG: Clear to auscultation bilaterally; No wheeze  HEART: Regular rate and rhythm; No murmurs, rubs, or gallops  ABDOMEN: Soft, moderately TTP diffusely, + distended   EXTREMITIES:  b/l LE edema to knees  NEUROLOGY: AAOx3, TAYLOR    LABS:                        9.6    4.10  )-----------( 96       ( 23 Jan 2025 05:03 )             28.7     01-23    136  |  108  |  11  ----------------------------<  341[H]  3.7   |  21[L]  |  0.67    Ca    8.2[L]      23 Jan 2025 05:03    TPro  4.8[L]  /  Alb  2.4[L]  /  TBili  1.3[H]  /  DBili  x   /  AST  52[H]  /  ALT  29  /  AlkPhos  578[H]  01-23          Urinalysis Basic - ( 23 Jan 2025 05:03 )    Color: x / Appearance: x / SG: x / pH: x  Gluc: 341 mg/dL / Ketone: x  / Bili: x / Urobili: x   Blood: x / Protein: x / Nitrite: x   Leuk Esterase: x / RBC: x / WBC x   Sq Epi: x / Non Sq Epi: x / Bacteria: x            RADIOLOGY & ADDITIONAL TESTS:  New Imaging Personally Reviewed Today:  New Electrocardiogram Personally Reviewed Today:  Other Results Reviewed Today:   Prior or Outpatient Records Reviewed Today with Summary:    COORDINATION OF CARE:  Consultant Communication and Details of Discussion (where applicable):

## 2025-01-23 NOTE — PROGRESS NOTE ADULT - PROBLEM SELECTOR PROBLEM 2
Pancreatic cancer

## 2025-01-23 NOTE — PROGRESS NOTE ADULT - PROBLEM SELECTOR PLAN 6
- due to severe deconditioning, cachexia in setting of advanced metastatic cancer  - continue supportive care

## 2025-01-23 NOTE — PROGRESS NOTE ADULT - ASSESSMENT
69y /o Mandarin speaking male with PMH HTN, HLD, paroxysmal afib on Eliquis, CAD s/p 3 stents, metastatic pancreatic adenoca s/p RT and palliative chemo, c/b duodenal obstruction, recent admission at [facility] (12/31/24-1/3/25) for management of duodenal obstruction with early signs of biliary obstruction, deemed high risk for surgical bypass due to ascites and hx of palliative radiation. 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     
69y /o Mandarin speaking male with PMH HTN, HLD, paroxysmal afib on Eliquis, CAD s/p 3 stents, metastatic pancreatic adenoca s/p RT and palliative chemo, c/b duodenal obstruction, recent admission at [facility] (12/31/24-1/3/25) for management of duodenal obstruction with early signs of biliary obstruction, deemed high risk for surgical bypass due to ascites and hx of palliative radiation. 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     
69y /o Mandarin speaking male with PMH HTN, HLD, paroxysmal afib on Eliquis, CAD s/p 3 stents, metastatic pancreatic adenoca s/p RT and palliative chemo, c/b duodenal obstruction, recent admission at [facility] (12/31/24-1/3/25) for management of duodenal obstruction with early signs of biliary obstruction, deemed high risk for surgical bypass due to ascites and hx of palliative radiation. 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     
69y /o Mandarin speaking male with PMH HTN, HLD, paroxysmal afib on Eliquis, CAD s/p 3 stents, metastatic pancreatic adenoca s/p RT and palliative chemo, c/b duodenal obstruction, recent admission at [facility] (12/31/24-1/3/25) for management of duodenal obstruction with early signs of biliary obstruction, deemed high risk for surgical bypass due to ascites and hx of palliative radiation. 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     
69y /o Mandarin speaking male with PMH HTN, HLD, paroxysmal afib on Eliquis, CAD s/p 3 stents, metastatic pancreatic adenoca s/p RT and palliative chemo, c/b duodenal obstruction, recent admission at [facility] (12/31/24-1/3/25) for management of duodenal obstruction with early signs of biliary obstruction, deemed high risk for surgical bypass due to ascites and hx of palliative radiation. 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     
69y /o Mandarin speaking male with PMH HTN, HLD, paroxysmal afib on Eliquis, CAD s/p 3 stents, metastatic pancreatic adenoca s/p RT and palliative chemo, c/b duodenal obstruction, recent admission at [facility] (12/31/24-1/3/25) for management of duodenal obstruction with early signs of biliary obstruction, deemed high risk for surgical bypass due to ascites and hx of palliative radiation. 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     
69y /o Mandarin speaking male with PMH HTN, HLD, paroxysmal afib on Eliquis, CAD s/p 3 stents, metastatic pancreatic adenoca s/p RT and palliative chemo, c/b duodenal obstruction, recent admission at [facility] (12/31/24-1/3/25) for management of duodenal obstruction with early signs of biliary obstruction, deemed high risk for surgical bypass due to ascites and hx of palliative radiation. 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

## 2025-01-24 ENCOUNTER — TRANSCRIPTION ENCOUNTER (OUTPATIENT)
Age: 70
End: 2025-01-24

## 2025-01-24 ENCOUNTER — APPOINTMENT (OUTPATIENT)
Dept: CT IMAGING | Facility: IMAGING CENTER | Age: 70
End: 2025-01-24

## 2025-01-24 ENCOUNTER — OUTPATIENT (OUTPATIENT)
Dept: OUTPATIENT SERVICES | Facility: HOSPITAL | Age: 70
LOS: 1 days | Discharge: HOME | End: 2025-01-24

## 2025-01-24 VITALS
RESPIRATION RATE: 19 BRPM | HEART RATE: 77 BPM | TEMPERATURE: 99 F | OXYGEN SATURATION: 93 % | DIASTOLIC BLOOD PRESSURE: 77 MMHG | SYSTOLIC BLOOD PRESSURE: 116 MMHG

## 2025-01-24 DIAGNOSIS — C25.9 MALIGNANT NEOPLASM OF PANCREAS, UNSPECIFIED: ICD-10-CM

## 2025-01-24 DIAGNOSIS — Z95.5 PRESENCE OF CORONARY ANGIOPLASTY IMPLANT AND GRAFT: Chronic | ICD-10-CM

## 2025-01-24 PROBLEM — I48.91 UNSPECIFIED ATRIAL FIBRILLATION: Chronic | Status: ACTIVE | Noted: 2025-01-16

## 2025-01-24 LAB — NON-GYNECOLOGICAL CYTOLOGY STUDY: SIGNIFICANT CHANGE UP

## 2025-01-24 PROCEDURE — 84484 ASSAY OF TROPONIN QUANT: CPT

## 2025-01-24 PROCEDURE — 82962 GLUCOSE BLOOD TEST: CPT

## 2025-01-24 PROCEDURE — 86901 BLOOD TYPING SEROLOGIC RH(D): CPT

## 2025-01-24 PROCEDURE — 83880 ASSAY OF NATRIURETIC PEPTIDE: CPT

## 2025-01-24 PROCEDURE — 83735 ASSAY OF MAGNESIUM: CPT

## 2025-01-24 PROCEDURE — 86850 RBC ANTIBODY SCREEN: CPT

## 2025-01-24 PROCEDURE — 84295 ASSAY OF SERUM SODIUM: CPT

## 2025-01-24 PROCEDURE — 82947 ASSAY GLUCOSE BLOOD QUANT: CPT

## 2025-01-24 PROCEDURE — 97161 PT EVAL LOW COMPLEX 20 MIN: CPT

## 2025-01-24 PROCEDURE — 80053 COMPREHEN METABOLIC PANEL: CPT

## 2025-01-24 PROCEDURE — 82140 ASSAY OF AMMONIA: CPT

## 2025-01-24 PROCEDURE — 99285 EMERGENCY DEPT VISIT HI MDM: CPT

## 2025-01-24 PROCEDURE — 97110 THERAPEUTIC EXERCISES: CPT

## 2025-01-24 PROCEDURE — 84100 ASSAY OF PHOSPHORUS: CPT

## 2025-01-24 PROCEDURE — 97116 GAIT TRAINING THERAPY: CPT

## 2025-01-24 PROCEDURE — 74018 RADEX ABDOMEN 1 VIEW: CPT

## 2025-01-24 PROCEDURE — 85018 HEMOGLOBIN: CPT

## 2025-01-24 PROCEDURE — 82435 ASSAY OF BLOOD CHLORIDE: CPT

## 2025-01-24 PROCEDURE — 85025 COMPLETE CBC W/AUTO DIFF WBC: CPT

## 2025-01-24 PROCEDURE — 99239 HOSP IP/OBS DSCHRG MGMT >30: CPT

## 2025-01-24 PROCEDURE — 85045 AUTOMATED RETICULOCYTE COUNT: CPT

## 2025-01-24 PROCEDURE — 83605 ASSAY OF LACTIC ACID: CPT

## 2025-01-24 PROCEDURE — 86900 BLOOD TYPING SEROLOGIC ABO: CPT

## 2025-01-24 PROCEDURE — 84132 ASSAY OF SERUM POTASSIUM: CPT

## 2025-01-24 PROCEDURE — 85027 COMPLETE CBC AUTOMATED: CPT

## 2025-01-24 PROCEDURE — 85730 THROMBOPLASTIN TIME PARTIAL: CPT

## 2025-01-24 PROCEDURE — 36415 COLL VENOUS BLD VENIPUNCTURE: CPT

## 2025-01-24 PROCEDURE — 80048 BASIC METABOLIC PNL TOTAL CA: CPT

## 2025-01-24 PROCEDURE — 82803 BLOOD GASES ANY COMBINATION: CPT

## 2025-01-24 PROCEDURE — 96374 THER/PROPH/DIAG INJ IV PUSH: CPT

## 2025-01-24 PROCEDURE — 85014 HEMATOCRIT: CPT

## 2025-01-24 PROCEDURE — 71045 X-RAY EXAM CHEST 1 VIEW: CPT

## 2025-01-24 PROCEDURE — 85610 PROTHROMBIN TIME: CPT

## 2025-01-24 PROCEDURE — 82330 ASSAY OF CALCIUM: CPT

## 2025-01-24 RX ORDER — ONDANSETRON 4 MG/1
1 TABLET, ORALLY DISINTEGRATING ORAL
Qty: 2 | Refills: 0
Start: 2025-01-24 | End: 2025-02-02

## 2025-01-24 RX ORDER — POLYETHYLENE GLYCOL 3350 17 G/17G
17 POWDER, FOR SOLUTION ORAL
Qty: 340 | Refills: 0
Start: 2025-01-24 | End: 2025-02-02

## 2025-01-24 RX ORDER — HYDROMORPHONE HYDROCHLORIDE 4 MG/ML
1 INJECTION, SOLUTION INTRAMUSCULAR; INTRAVENOUS; SUBCUTANEOUS
Qty: 0 | Refills: 0 | DISCHARGE
Start: 2025-01-24

## 2025-01-24 RX ORDER — SENNOSIDES 8.6 MG
2 TABLET ORAL
Qty: 60 | Refills: 0
Start: 2025-01-24 | End: 2025-02-22

## 2025-01-24 RX ORDER — OLANZAPINE 10 MG/1
1 TABLET, FILM COATED ORAL
Qty: 30 | Refills: 0
Start: 2025-01-24 | End: 2025-02-22

## 2025-01-24 RX ADMIN — ONDANSETRON 8 MILLIGRAM(S): 4 TABLET, ORALLY DISINTEGRATING ORAL at 08:43

## 2025-01-24 RX ADMIN — APIXABAN 5 MILLIGRAM(S): 5 TABLET, FILM COATED ORAL at 06:01

## 2025-01-24 RX ADMIN — Medication 125 MICROGRAM(S): at 06:01

## 2025-01-24 RX ADMIN — GABAPENTIN 400 MILLIGRAM(S): 800 TABLET ORAL at 06:01

## 2025-01-24 RX ADMIN — METHADONE HYDROCHLORIDE 10 MILLIGRAM(S): 5 SOLUTION ORAL at 06:01

## 2025-01-24 RX ADMIN — POLYETHYLENE GLYCOL 3350 17 GRAM(S): 17 POWDER, FOR SOLUTION ORAL at 06:02

## 2025-01-24 NOTE — DISCHARGE NOTE NURSING/CASE MANAGEMENT/SOCIAL WORK - PATIENT PORTAL LINK FT
Yes You can access the FollowMyHealth Patient Portal offered by [facility] by registering at the following website: [email or web address]. By joining [employer]’s FollowMyHealth portal, you will also be able to view your health information using other applications (apps) compatible with our system.

## 2025-01-28 ENCOUNTER — APPOINTMENT (OUTPATIENT)
Dept: HEMATOLOGY ONCOLOGY | Facility: CLINIC | Age: 70
End: 2025-01-28

## 2025-01-28 ENCOUNTER — APPOINTMENT (OUTPATIENT)
Dept: SURGICAL ONCOLOGY | Facility: CLINIC | Age: 70
End: 2025-01-28

## 2025-01-28 DIAGNOSIS — C25.9 MALIGNANT NEOPLASM OF PANCREAS, UNSPECIFIED: ICD-10-CM

## 2025-01-28 DIAGNOSIS — C25.1 MALIGNANT NEOPLASM OF BODY OF PANCREAS: ICD-10-CM

## 2025-01-28 DIAGNOSIS — R18.8 OTHER ASCITES: ICD-10-CM

## 2025-02-06 ENCOUNTER — APPOINTMENT (OUTPATIENT)
Dept: RADIATION ONCOLOGY | Facility: CLINIC | Age: 70
End: 2025-02-06

## 2025-03-12 NOTE — DISEASE MANAGEMENT
PA for Zepbound 2.5 mg SUBMITTED to [employer] [facility]     via    [x]CMM-KEY: [id number]  []Surescripts-Case ID #   []Availity-Auth ID # NDC #   []Faxed to plan   []Other website   []Phone call Case ID #     [x]PA sent as URGENT    All office notes, labs and other pertaining documents and studies sent. Clinical questions answered. Awaiting determination from insurance company.     Turnaround time for your insurance to make a decision on your Prior Authorization can take 7-21 business days.                 [TTNM] : - [NTNM] : - [MTNM] : -

## (undated) DEVICE — TUBING IV SET GRAVITY 3Y 100" MACRO

## (undated) DEVICE — SYR LUER LOK 10CC

## (undated) DEVICE — DRSG CURITY GAUZE SPONGE 4 X 4" 12-PLY NON-STERILE

## (undated) DEVICE — BIOPSY FORCEP RADIAL JAW 4 STANDARD WITH NEEDLE

## (undated) DEVICE — LUBRICATING JELLY HR ONE SHOT 3G

## (undated) DEVICE — ELCTR ECG CONDUCTIVE ADHESIVE

## (undated) DEVICE — CONTAINER FORMALIN 80ML YELLOW

## (undated) DEVICE — BASIN EMESIS 10IN GRADUATED MAUVE

## (undated) DEVICE — OMNIPAQUE 300  30ML

## (undated) DEVICE — CATH NG SALEM SUMP 14FR

## (undated) DEVICE — INJ SYS RAP REFIL

## (undated) DEVICE — TUBING MEDI-VAC W MAXIGRIP CONNECTORS 1/4"X6'

## (undated) DEVICE — DENTURE CUP PINK

## (undated) DEVICE — CATH IV SAFE BC 22G X 1" (BLUE)

## (undated) DEVICE — BITE BLOCK ADULT 20 X 27MM (GREEN)

## (undated) DEVICE — TUBING SUCTION NONCONDUCTIVE 6MM X 12FT

## (undated) DEVICE — GOWN LG

## (undated) DEVICE — PACK IV START WITH CHG

## (undated) DEVICE — SOL IRR POUR NS 0.9% 500ML

## (undated) DEVICE — DEVICE GRASPING RAPTOR

## (undated) DEVICE — CLAMP BX HOT RAD JAW 3

## (undated) DEVICE — UNDERPAD LINEN SAVER 17 X 24"

## (undated) DEVICE — DRSG 2X2

## (undated) DEVICE — SALIVA EJECTOR (BLUE)

## (undated) DEVICE — BIOPSY FORCEP COLD DISP

## (undated) DEVICE — SYR LUER LOK 3CC

## (undated) DEVICE — DRSG BANDAID 0.75X3"

## (undated) DEVICE — SOL INJ NS 0.9% 500ML 1-PORT

## (undated) DEVICE — NDL HYPO SAFE 22G X 1" (BLACK)